# Patient Record
Sex: FEMALE | Race: WHITE | NOT HISPANIC OR LATINO | Employment: OTHER | URBAN - METROPOLITAN AREA
[De-identification: names, ages, dates, MRNs, and addresses within clinical notes are randomized per-mention and may not be internally consistent; named-entity substitution may affect disease eponyms.]

---

## 2017-10-24 ENCOUNTER — HISTORICAL (OUTPATIENT)
Dept: RADIOLOGY | Facility: HOSPITAL | Age: 57
End: 2017-10-24

## 2019-05-30 ENCOUNTER — HISTORICAL (OUTPATIENT)
Dept: RADIOLOGY | Facility: HOSPITAL | Age: 59
End: 2019-05-30

## 2022-03-11 ENCOUNTER — TELEPHONE (OUTPATIENT)
Dept: INTERNAL MEDICINE | Facility: CLINIC | Age: 62
End: 2022-03-11
Payer: COMMERCIAL

## 2022-03-11 NOTE — TELEPHONE ENCOUNTER
----- Message from Aleta Morris sent at 3/10/2022  4:15 PM CST -----  Contact: Self   Pt is requesting for office to request her medical records. Phone: [phone] Dr Wood Cesar. Pt has appt on 3/30 to [facility]. Please advise

## 2022-03-14 ENCOUNTER — CLINICAL SUPPORT (OUTPATIENT)
Dept: INTERNAL MEDICINE | Facility: CLINIC | Age: 62
End: 2022-03-14
Payer: COMMERCIAL

## 2022-03-14 ENCOUNTER — TELEPHONE (OUTPATIENT)
Dept: INTERNAL MEDICINE | Facility: CLINIC | Age: 62
End: 2022-03-14

## 2022-03-14 NOTE — PROGRESS NOTES
Patient in office today to n medical release to obtain records from [facility], and [facility] Dr Duran, releases signed by patient and faxed at this time

## 2022-03-14 NOTE — TELEPHONE ENCOUNTER
Patient in office today to [facility] medical release to obtain records from [facility], and [facility] Dr Duran, releases signed by patient and faxed at this time.

## 2022-03-30 ENCOUNTER — LAB VISIT (OUTPATIENT)
Dept: LAB | Facility: HOSPITAL | Age: 62
End: 2022-03-30
Attending: INTERNAL MEDICINE
Payer: COMMERCIAL

## 2022-03-30 ENCOUNTER — OFFICE VISIT (OUTPATIENT)
Dept: INTERNAL MEDICINE | Facility: CLINIC | Age: 62
End: 2022-03-30
Payer: COMMERCIAL

## 2022-03-30 ENCOUNTER — PATIENT MESSAGE (OUTPATIENT)
Dept: INTERNAL MEDICINE | Facility: CLINIC | Age: 62
End: 2022-03-30

## 2022-03-30 VITALS
WEIGHT: 141.31 LBS | HEIGHT: 66 IN | OXYGEN SATURATION: 98 % | DIASTOLIC BLOOD PRESSURE: 72 MMHG | RESPIRATION RATE: 18 BRPM | BODY MASS INDEX: 22.71 KG/M2 | SYSTOLIC BLOOD PRESSURE: 142 MMHG | HEART RATE: 71 BPM

## 2022-03-30 DIAGNOSIS — M25.50 POLYARTHRALGIA: ICD-10-CM

## 2022-03-30 DIAGNOSIS — Z00.00 LABORATORY EXAMINATION ORDERED AS PART OF A ROUTINE GENERAL MEDICAL EXAMINATION: ICD-10-CM

## 2022-03-30 DIAGNOSIS — Z01.419 WELL WOMAN EXAM: ICD-10-CM

## 2022-03-30 DIAGNOSIS — E03.9 HYPOTHYROIDISM (ACQUIRED): ICD-10-CM

## 2022-03-30 DIAGNOSIS — I49.3 PVC (PREMATURE VENTRICULAR CONTRACTION): ICD-10-CM

## 2022-03-30 DIAGNOSIS — F41.9 ANXIETY: ICD-10-CM

## 2022-03-30 DIAGNOSIS — R01.1 CARDIAC MURMUR: ICD-10-CM

## 2022-03-30 DIAGNOSIS — I10 PRIMARY HYPERTENSION: ICD-10-CM

## 2022-03-30 DIAGNOSIS — Z76.89 ENCOUNTER TO ESTABLISH CARE: Primary | ICD-10-CM

## 2022-03-30 DIAGNOSIS — F17.210 CIGARETTE NICOTINE DEPENDENCE WITHOUT COMPLICATION: ICD-10-CM

## 2022-03-30 DIAGNOSIS — Z12.31 BREAST CANCER SCREENING BY MAMMOGRAM: ICD-10-CM

## 2022-03-30 DIAGNOSIS — E78.2 MIXED HYPERLIPIDEMIA: Primary | ICD-10-CM

## 2022-03-30 LAB
ALBUMIN SERPL BCP-MCNC: 4.6 G/DL (ref 3.5–5.2)
ALP SERPL-CCNC: 61 U/L (ref 55–135)
ALT SERPL W/O P-5'-P-CCNC: 11 U/L (ref 10–44)
ANION GAP SERPL CALC-SCNC: 10 MMOL/L (ref 8–16)
AST SERPL-CCNC: 17 U/L (ref 10–40)
BASOPHILS # BLD AUTO: 0.04 K/UL (ref 0–0.2)
BASOPHILS NFR BLD: 0.6 % (ref 0–1.9)
BILIRUB SERPL-MCNC: 0.5 MG/DL (ref 0.1–1)
BUN SERPL-MCNC: 11 MG/DL (ref 8–23)
CALCIUM SERPL-MCNC: 10.1 MG/DL (ref 8.7–10.5)
CHLORIDE SERPL-SCNC: 102 MMOL/L (ref 95–110)
CHOLEST SERPL-MCNC: 218 MG/DL (ref 120–199)
CHOLEST/HDLC SERPL: 3.6 {RATIO} (ref 2–5)
CO2 SERPL-SCNC: 27 MMOL/L (ref 23–29)
CREAT SERPL-MCNC: 0.8 MG/DL (ref 0.5–1.4)
DIFFERENTIAL METHOD: ABNORMAL
EOSINOPHIL # BLD AUTO: 0.2 K/UL (ref 0–0.5)
EOSINOPHIL NFR BLD: 2.7 % (ref 0–8)
ERYTHROCYTE [DISTWIDTH] IN BLOOD BY AUTOMATED COUNT: 13.1 % (ref 11.5–14.5)
EST. GFR  (AFRICAN AMERICAN): >60 ML/MIN/1.73 M^2
EST. GFR  (NON AFRICAN AMERICAN): >60 ML/MIN/1.73 M^2
ESTIMATED AVG GLUCOSE: 103 MG/DL (ref 68–131)
GLUCOSE SERPL-MCNC: 96 MG/DL (ref 70–110)
HBA1C MFR BLD: 5.2 % (ref 4–5.6)
HCT VFR BLD AUTO: 50.4 % (ref 37–48.5)
HDLC SERPL-MCNC: 60 MG/DL (ref 40–75)
HDLC SERPL: 27.5 % (ref 20–50)
HGB BLD-MCNC: 16.1 G/DL (ref 12–16)
IMM GRANULOCYTES # BLD AUTO: 0.03 K/UL (ref 0–0.04)
IMM GRANULOCYTES NFR BLD AUTO: 0.4 % (ref 0–0.5)
LDLC SERPL CALC-MCNC: 132.4 MG/DL (ref 63–159)
LYMPHOCYTES # BLD AUTO: 2.2 K/UL (ref 1–4.8)
LYMPHOCYTES NFR BLD: 32.3 % (ref 18–48)
MCH RBC QN AUTO: 30.4 PG (ref 27–31)
MCHC RBC AUTO-ENTMCNC: 31.9 G/DL (ref 32–36)
MCV RBC AUTO: 95 FL (ref 82–98)
MONOCYTES # BLD AUTO: 0.5 K/UL (ref 0.3–1)
MONOCYTES NFR BLD: 7.3 % (ref 4–15)
NEUTROPHILS # BLD AUTO: 3.8 K/UL (ref 1.8–7.7)
NEUTROPHILS NFR BLD: 56.7 % (ref 38–73)
NONHDLC SERPL-MCNC: 158 MG/DL
NRBC BLD-RTO: 0 /100 WBC
PLATELET # BLD AUTO: 281 K/UL (ref 150–450)
PMV BLD AUTO: 10.8 FL (ref 9.2–12.9)
POTASSIUM SERPL-SCNC: 4.5 MMOL/L (ref 3.5–5.1)
PROT SERPL-MCNC: 7.9 G/DL (ref 6–8.4)
RBC # BLD AUTO: 5.3 M/UL (ref 4–5.4)
SODIUM SERPL-SCNC: 139 MMOL/L (ref 136–145)
TRIGL SERPL-MCNC: 128 MG/DL (ref 30–150)
TSH SERPL DL<=0.005 MIU/L-ACNC: 1.69 UIU/ML (ref 0.4–4)
WBC # BLD AUTO: 6.68 K/UL (ref 3.9–12.7)

## 2022-03-30 PROCEDURE — 4010F ACE/ARB THERAPY RXD/TAKEN: CPT | Mod: CPTII,S$GLB,, | Performed by: INTERNAL MEDICINE

## 2022-03-30 PROCEDURE — 3078F DIAST BP <80 MM HG: CPT | Mod: CPTII,S$GLB,, | Performed by: INTERNAL MEDICINE

## 2022-03-30 PROCEDURE — 4010F PR ACE/ARB THEARPY RXD/TAKEN: ICD-10-PCS | Mod: CPTII,S$GLB,, | Performed by: INTERNAL MEDICINE

## 2022-03-30 PROCEDURE — 3077F SYST BP >= 140 MM HG: CPT | Mod: CPTII,S$GLB,, | Performed by: INTERNAL MEDICINE

## 2022-03-30 PROCEDURE — 83036 HEMOGLOBIN GLYCOSYLATED A1C: CPT | Performed by: INTERNAL MEDICINE

## 2022-03-30 PROCEDURE — 99999 PR PBB SHADOW E&M-EST. PATIENT-LVL V: ICD-10-PCS | Mod: PBBFAC,,, | Performed by: INTERNAL MEDICINE

## 2022-03-30 PROCEDURE — 99386 PREV VISIT NEW AGE 40-64: CPT | Mod: S$GLB,,, | Performed by: INTERNAL MEDICINE

## 2022-03-30 PROCEDURE — 36415 COLL VENOUS BLD VENIPUNCTURE: CPT | Performed by: INTERNAL MEDICINE

## 2022-03-30 PROCEDURE — 99999 PR PBB SHADOW E&M-EST. PATIENT-LVL V: CPT | Mod: PBBFAC,,, | Performed by: INTERNAL MEDICINE

## 2022-03-30 PROCEDURE — 80061 LIPID PANEL: CPT | Performed by: INTERNAL MEDICINE

## 2022-03-30 PROCEDURE — 80053 COMPREHEN METABOLIC PANEL: CPT | Performed by: INTERNAL MEDICINE

## 2022-03-30 PROCEDURE — 3078F PR MOST RECENT DIASTOLIC BLOOD PRESSURE < 80 MM HG: ICD-10-PCS | Mod: CPTII,S$GLB,, | Performed by: INTERNAL MEDICINE

## 2022-03-30 PROCEDURE — 84443 ASSAY THYROID STIM HORMONE: CPT | Performed by: INTERNAL MEDICINE

## 2022-03-30 PROCEDURE — 3008F BODY MASS INDEX DOCD: CPT | Mod: CPTII,S$GLB,, | Performed by: INTERNAL MEDICINE

## 2022-03-30 PROCEDURE — 3077F PR MOST RECENT SYSTOLIC BLOOD PRESSURE >= 140 MM HG: ICD-10-PCS | Mod: CPTII,S$GLB,, | Performed by: INTERNAL MEDICINE

## 2022-03-30 PROCEDURE — 86803 HEPATITIS C AB TEST: CPT | Performed by: INTERNAL MEDICINE

## 2022-03-30 PROCEDURE — 1159F MED LIST DOCD IN RCRD: CPT | Mod: CPTII,S$GLB,, | Performed by: INTERNAL MEDICINE

## 2022-03-30 PROCEDURE — 3008F PR BODY MASS INDEX (BMI) DOCUMENTED: ICD-10-PCS | Mod: CPTII,S$GLB,, | Performed by: INTERNAL MEDICINE

## 2022-03-30 PROCEDURE — 87389 HIV-1 AG W/HIV-1&-2 AB AG IA: CPT | Performed by: INTERNAL MEDICINE

## 2022-03-30 PROCEDURE — 1159F PR MEDICATION LIST DOCUMENTED IN MEDICAL RECORD: ICD-10-PCS | Mod: CPTII,S$GLB,, | Performed by: INTERNAL MEDICINE

## 2022-03-30 PROCEDURE — 85025 COMPLETE CBC W/AUTO DIFF WBC: CPT | Performed by: INTERNAL MEDICINE

## 2022-03-30 PROCEDURE — 99386 PR PREVENTIVE VISIT,NEW,40-64: ICD-10-PCS | Mod: S$GLB,,, | Performed by: INTERNAL MEDICINE

## 2022-03-30 RX ORDER — LOSARTAN POTASSIUM 50 MG/1
50 TABLET ORAL DAILY
Qty: 90 TABLET | Refills: 3 | Status: SHIPPED | OUTPATIENT
Start: 2022-03-30 | End: 2023-02-06 | Stop reason: SDUPTHER

## 2022-03-30 RX ORDER — DILTIAZEM HYDROCHLORIDE 240 MG/1
240 CAPSULE, COATED, EXTENDED RELEASE ORAL DAILY
COMMUNITY
Start: 2021-11-22 | End: 2022-03-30 | Stop reason: SDUPTHER

## 2022-03-30 RX ORDER — HYDROCHLOROTHIAZIDE 25 MG/1
25 TABLET ORAL DAILY
COMMUNITY
Start: 2021-11-22 | End: 2022-03-30 | Stop reason: SDUPTHER

## 2022-03-30 RX ORDER — LEVOTHYROXINE SODIUM 50 UG/1
50 TABLET ORAL
COMMUNITY
End: 2022-03-30 | Stop reason: SDUPTHER

## 2022-03-30 RX ORDER — ASPIRIN 81 MG/1
81 TABLET ORAL DAILY
COMMUNITY

## 2022-03-30 RX ORDER — LEVOTHYROXINE SODIUM 50 UG/1
50 TABLET ORAL
Qty: 90 TABLET | Refills: 3 | Status: SHIPPED | OUTPATIENT
Start: 2022-03-30 | End: 2023-02-06 | Stop reason: SDUPTHER

## 2022-03-30 RX ORDER — BUPROPION HYDROCHLORIDE 150 MG/1
150 TABLET ORAL 2 TIMES DAILY
Qty: 180 TABLET | Refills: 3 | Status: SHIPPED | OUTPATIENT
Start: 2022-03-30 | End: 2023-03-30 | Stop reason: SDUPTHER

## 2022-03-30 RX ORDER — DILTIAZEM HYDROCHLORIDE 240 MG/1
240 CAPSULE, COATED, EXTENDED RELEASE ORAL DAILY
Qty: 90 CAPSULE | Refills: 3 | Status: SHIPPED | OUTPATIENT
Start: 2022-03-30 | End: 2022-12-27

## 2022-03-30 RX ORDER — LOSARTAN POTASSIUM 50 MG/1
TABLET ORAL
COMMUNITY
End: 2022-03-30 | Stop reason: SDUPTHER

## 2022-03-30 RX ORDER — HYDROCHLOROTHIAZIDE 25 MG/1
25 TABLET ORAL DAILY
Qty: 90 TABLET | Refills: 3 | Status: SHIPPED | OUTPATIENT
Start: 2022-03-30 | End: 2023-02-06 | Stop reason: SDUPTHER

## 2022-03-30 RX ORDER — ATORVASTATIN CALCIUM 10 MG/1
10 TABLET, FILM COATED ORAL DAILY
Qty: 90 TABLET | Refills: 3 | Status: SHIPPED | OUTPATIENT
Start: 2022-03-30 | End: 2023-02-06 | Stop reason: SINTOL

## 2022-03-30 NOTE — PROGRESS NOTES
Subjective:       Patient ID: Nora Burger is a 61 y.o. female.    Chief Complaint: No chief complaint on file.      HPI  Nora Burger is a 61 y.o. year old female with HTN, PVCs, mitral valve murmur (asymptomatic, had seen cards), smoker (1/2-1 ppd x 45 years), hypothyroidism presents for establishment of care.    Records review from [facility]  Last mammogram 2020 (normal)  Last colon   Last pap hysterectomy  covid-19 vaccine ,       Past surgical hx   x 2  Dilation of esophagus 2019    Former smoker, quit 2019?    meds  HCTZ 25  Losartan 50  ASA 81  wellbutrin  q24  cartia xt (diltiazem) 240 mg qday  Levothyroxine 50    Review of Systems   Constitutional: Negative for activity change, appetite change, fatigue, fever and unexpected weight change.   HENT: Negative for congestion, hearing loss, postnasal drip, sneezing, sore throat, trouble swallowing and voice change.    Eyes: Negative for pain and discharge.   Respiratory: Negative for cough, choking, chest tightness, shortness of breath and wheezing.    Cardiovascular: Negative for chest pain, palpitations and leg swelling.   Gastrointestinal: Negative for abdominal distention, abdominal pain, blood in stool, constipation, diarrhea, nausea and vomiting.   Endocrine: Negative for polydipsia and polyuria.   Genitourinary: Negative for difficulty urinating and flank pain.   Musculoskeletal: Negative for arthralgias, back pain, joint swelling, myalgias and neck pain.   Skin: Negative for rash.   Neurological: Negative for dizziness, tremors, seizures, weakness, numbness and headaches.   Psychiatric/Behavioral: Negative for agitation. The patient is not nervous/anxious.          History reviewed. No pertinent past medical history.     Prior to Admission medications    Not on File        Past medical history, surgical history, and family medical history reviewed and updated as appropriate.    Medications and allergies  "reviewed.     Objective:          Vitals:    03/30/22 0839   BP: (!) 142/72   BP Location: Right arm   Patient Position: Sitting   BP Method: Medium (Manual)   Pulse: 71   Resp: 18   SpO2: 98%   Weight: 64.1 kg (141 lb 5 oz)   Height: 5' 6" (1.676 m)     Body mass index is 22.81 kg/m².  Physical Exam  Constitutional:       Appearance: She is well-developed.   HENT:      Head: Normocephalic and atraumatic.   Eyes:      Extraocular Movements: Extraocular movements intact.   Cardiovascular:      Rate and Rhythm: Normal rate and regular rhythm.      Heart sounds: Normal heart sounds.   Pulmonary:      Effort: Pulmonary effort is normal. No respiratory distress.      Breath sounds: Normal breath sounds. No wheezing.   Abdominal:      General: Bowel sounds are normal. There is no distension.      Palpations: Abdomen is soft.      Tenderness: There is no abdominal tenderness.   Musculoskeletal:         General: No tenderness. Normal range of motion.      Cervical back: Normal range of motion.   Skin:     General: Skin is warm and dry.   Neurological:      Mental Status: She is alert and oriented to person, place, and time.      Cranial Nerves: No cranial nerve deficit.      Deep Tendon Reflexes: Reflexes are normal and symmetric.         No results found for: WBC, HGB, HCT, PLT, CHOL, TRIG, HDL, LDLDIRECT, ALT, AST, NA, K, CL, CREATININE, BUN, CO2, TSH, PSA, INR, GLUF, HGBA1C, MICROALBUR    Assessment:       1. Encounter to establish care    2. Laboratory examination ordered as part of a routine general medical examination    3. Primary hypertension    4. PVC (premature ventricular contraction)    5. Hypothyroidism (acquired)    6. Polyarthralgia    7. Anxiety    8. Breast cancer screening by mammogram    9. Well woman exam    10. Cigarette nicotine dependence without complication          Plan:     Diagnoses and all orders for this visit:    Encounter to establish care    Laboratory examination ordered as part of a " routine general medical examination  -     Hemoglobin A1C; Future  -     Lipid Panel; Future  -     Hepatitis C Antibody; Future  -     HIV 1/2 Ag/Ab (4th Gen); Future    Primary hypertension  -     CBC Auto Differential; Future  -     Comprehensive Metabolic Panel; Future  -     TSH; Future  -     Hemoglobin A1C; Future  -     Lipid Panel; Future  -     losartan (COZAAR) 50 MG tablet; Take 1 tablet (50 mg total) by mouth once daily.  -     hydroCHLOROthiazide (HYDRODIURIL) 25 MG tablet; Take 1 tablet (25 mg total) by mouth once daily.  -     diltiaZEM (CARDIZEM CD) 240 MG 24 hr capsule; Take 1 capsule (240 mg total) by mouth once daily.    PVC (premature ventricular contraction)  -     diltiaZEM (CARDIZEM CD) 240 MG 24 hr capsule; Take 1 capsule (240 mg total) by mouth once daily.    Hypothyroidism (acquired)  -     levothyroxine (SYNTHROID) 50 MCG tablet; Take 1 tablet (50 mcg total) by mouth before breakfast.    Polyarthralgia    Anxiety  -     buPROPion (WELLBUTRIN XL) 150 MG TB24 tablet; Take 1 tablet (150 mg total) by mouth 2 (two) times a day.    Breast cancer screening by mammogram  -     Mammo Digital Screening Bilat; Future    Well woman exam  -     Ambulatory referral/consult to Gynecology; Future    Cigarette nicotine dependence without complication  Comments:  trying to quit, has quit for several months previously, referral placed to smoking cessation   1/2 ppd x 45 years  Orders:  -     Ambulatory referral/consult to Smoking Cessation Program; Future  -     CT Chest Lung Screening Low Dose; Future  -     buPROPion (WELLBUTRIN XL) 150 MG TB24 tablet; Take 1 tablet (150 mg total) by mouth 2 (two) times a day.    HTN - controlled at home, BP much better at home, 110s-120s/70s  Hypothyroidism - refilled levothyroxine 50 mcg qam  Anxiety - refilled wellbutrin xL 150 BID  Tobacco abuse - refer to smoking cessation, counseled on cessation <3 minutes. LDCT for lung cancer screening.  Cardiac murmur / PVCs -  told by cardiologist just needs to take medications; not symptomatic. No dyspnea on exertion, no presyncope, no palpitations / chest pain. Will continue cardizem 240 mg qday.  Referral to gyn for PMS symptoms  - used to be on hormone pellets.       Health maintenance reviewed with patient. Declines immunizations. Discussed due for tdap, pneumococcal, covid-19 booster, shingrix, influenza.     Follow up in about 1 year (around 3/30/2023).    Noah Monroy MD  Internal Medicine / Primary Care  [facility]  3/30/2022

## 2022-03-30 NOTE — PATIENT INSTRUCTIONS
Fasting labwork today  Schedule gynecology appointment  Schedule mammogram    Immunizations due   Pneumococcal   Tetanus  Covid-19 booster  Shingles  Influenza    Refills sent to [facility]    Return to clinic in 1 year or sooner if needed.

## 2022-04-01 LAB
HCV AB SERPL QL IA: NEGATIVE
HIV 1+2 AB+HIV1 P24 AG SERPL QL IA: NEGATIVE

## 2022-04-13 ENCOUNTER — CLINICAL SUPPORT (OUTPATIENT)
Dept: SMOKING CESSATION | Facility: CLINIC | Age: 62
End: 2022-04-13
Payer: COMMERCIAL

## 2022-04-13 DIAGNOSIS — F17.200 NICOTINE DEPENDENCE: Primary | ICD-10-CM

## 2022-04-13 PROCEDURE — 99999 PR PBB SHADOW E&M-EST. PATIENT-LVL II: CPT | Mod: PBBFAC,,,

## 2022-04-13 PROCEDURE — 99404 PR PREVENT COUNSEL,INDIV,60 MIN: ICD-10-PCS | Mod: S$GLB,,,

## 2022-04-13 PROCEDURE — 99999 PR PBB SHADOW E&M-EST. PATIENT-LVL II: ICD-10-PCS | Mod: PBBFAC,,,

## 2022-04-13 PROCEDURE — 99404 PREV MED CNSL INDIV APPRX 60: CPT | Mod: S$GLB,,,

## 2022-04-13 RX ORDER — DM/P-EPHED/ACETAMINOPH/DOXYLAM 30-7.5/3
2 LIQUID (ML) ORAL
Qty: 144 LOZENGE | Refills: 0 | Status: SHIPPED | OUTPATIENT
Start: 2022-04-13 | End: 2023-02-06

## 2022-04-13 RX ORDER — IBUPROFEN 200 MG
1 TABLET ORAL DAILY
Qty: 28 PATCH | Refills: 0 | Status: SHIPPED | OUTPATIENT
Start: 2022-04-13 | End: 2023-02-06

## 2022-04-13 NOTE — PROGRESS NOTES
Patient was seen in clinic this morning for Quit 1 intake.  Patient will be participating in biweekly tobacco cessation clinic/phone sessions and will begin the prescribed tobacco cessation medication regimen of 21 mg Nicotine patch along with 2 mg nicotine lozenges. FTND score of 6 indicates a high level of nicotine dependence. LUIS-D score of 9 perceived as no degree of mental distress / possible depression.  Pt will start on rate reduction and wait time of 15 min prior to smoking. Patient currently smoking 1/2 -1 ppd. Patient will be seen in clinic in 2 weeks for follow up progress. Current CO measurement= 13 ppm (0-5 ppm is a non-smoker).  Patient states that her  smokes as well.

## 2022-04-27 ENCOUNTER — CLINICAL SUPPORT (OUTPATIENT)
Dept: SMOKING CESSATION | Facility: CLINIC | Age: 62
End: 2022-04-27
Payer: COMMERCIAL

## 2022-04-27 DIAGNOSIS — F17.200 NICOTINE DEPENDENCE: Primary | ICD-10-CM

## 2022-04-27 PROCEDURE — 99404 PREV MED CNSL INDIV APPRX 60: CPT | Mod: S$GLB,,,

## 2022-04-27 PROCEDURE — 99404 PR PREVENT COUNSEL,INDIV,60 MIN: ICD-10-PCS | Mod: S$GLB,,,

## 2022-04-27 PROCEDURE — 99999 PR PBB SHADOW E&M-EST. PATIENT-LVL I: ICD-10-PCS | Mod: PBBFAC,,,

## 2022-04-27 PROCEDURE — 99999 PR PBB SHADOW E&M-EST. PATIENT-LVL I: CPT | Mod: PBBFAC,,,

## 2022-04-27 RX ORDER — VARENICLINE TARTRATE 1 MG/1
TABLET, FILM COATED ORAL
Qty: 56 TABLET | Refills: 0 | Status: SHIPPED | OUTPATIENT
Start: 2022-04-27 | End: 2022-05-25 | Stop reason: SDUPTHER

## 2022-04-27 NOTE — PROGRESS NOTES
Individual Follow-Up Form    4/27/2022    Quit Date:     Clinical Status of Patient: Outpatient        Continuing Medication: yes  Patches, lozenges    Other Medications:      Target Symptoms: Withdrawal and medication side effects. The following were  rated moderate (3) to severe (4) on TCRS:  · Moderate (3): none  · Severe (4): none    Comments: Patient was seen in clinic this morning for smoking cessation progress update. Patient reported that she was smoking between 8-10 cpd. She feels that she has made some progress. Completion of TCRS (Tobacco Cessation Rating Scale) learned addiction model, cues/triggers, personal reasons for quitting, medications, goals, quit date. Patient  does smoke but tries not to smoke around her while they are outside. Encouraged patient to keep moving her cigarettes and increase wait times. Patient was interested in trying Chantix. She is currently on Wellbutrin that was prescribed by Dr. Monroy for smoking. Will add Chantix to patient smoking cessation medication regimen to see if it aids in her quit episode. Exhaled carbon monoxide level of 6 ppm per Smokerlyzer (0-6 non-smoker). The patient will continue with  therapy sessions and medication monitoring by CTTS. The patient remains on the prescribed tobacco cessation medication regimen of 21 mg nicotine patch without any negative side effects at this time. The patient denies any abnormal behavioral or mental changes at this time.       Diagnosis: F17.200    Next Visit: 2 weeks

## 2022-05-10 ENCOUNTER — CLINICAL SUPPORT (OUTPATIENT)
Dept: SMOKING CESSATION | Facility: CLINIC | Age: 62
End: 2022-05-10
Payer: COMMERCIAL

## 2022-05-10 DIAGNOSIS — F17.200 NICOTINE DEPENDENCE: Primary | ICD-10-CM

## 2022-05-10 PROCEDURE — 99999 PR PBB SHADOW E&M-EST. PATIENT-LVL I: CPT | Mod: PBBFAC,,,

## 2022-05-10 PROCEDURE — 99401 PR PREVENT COUNSEL,INDIV,15 MIN: ICD-10-PCS | Mod: S$GLB,,,

## 2022-05-10 PROCEDURE — 99999 PR PBB SHADOW E&M-EST. PATIENT-LVL I: ICD-10-PCS | Mod: PBBFAC,,,

## 2022-05-10 PROCEDURE — 99401 PREV MED CNSL INDIV APPRX 15: CPT | Mod: S$GLB,,,

## 2022-05-10 NOTE — PROGRESS NOTES
Individual Follow-Up Form    5/10/2022    Quit Date: 5/5/22    Clinical Status of Patient: Outpatient    Continuing Medication: yes  Chantix    Other Medications:      Target Symptoms: Withdrawal and medication side effects. The following were  rated moderate (3) to severe (4) on TCRS:  · Moderate (3): none  · Severe (4): none    Comments: Patient called to cancel her appointment on 5/11/22, due to another appointment. Patient stated that she has not smoked in 6 days. Congratulated patient on her quit. Patient having a dental procedure tomorrow and has given her the goal to quit. She not able to wear patch due to nicotine. Suggested that she not go back to wearing the patch and just continue using Chantix. Patient proud of her quit. The patient will continue with  therapy sessions and medication monitoring by CTTS. The patient remains on the prescribed tobacco cessation medication regimen of 1 mg Chantix BID without any negative side effects at this time. The patient denies any abnormal behavioral or mental changes at this time.     Diagnosis: F17.200    Next Visit: 2 weeks

## 2022-05-25 ENCOUNTER — CLINICAL SUPPORT (OUTPATIENT)
Dept: SMOKING CESSATION | Facility: CLINIC | Age: 62
End: 2022-05-25
Payer: COMMERCIAL

## 2022-05-25 DIAGNOSIS — F17.200 NICOTINE DEPENDENCE: Primary | ICD-10-CM

## 2022-05-25 PROCEDURE — 99404 PREV MED CNSL INDIV APPRX 60: CPT | Mod: S$GLB,,,

## 2022-05-25 PROCEDURE — 99404 PR PREVENT COUNSEL,INDIV,60 MIN: ICD-10-PCS | Mod: S$GLB,,,

## 2022-05-25 PROCEDURE — 99999 PR PBB SHADOW E&M-EST. PATIENT-LVL I: CPT | Mod: PBBFAC,,,

## 2022-05-25 PROCEDURE — 99999 PR PBB SHADOW E&M-EST. PATIENT-LVL I: ICD-10-PCS | Mod: PBBFAC,,,

## 2022-05-25 RX ORDER — VARENICLINE TARTRATE 1 MG/1
TABLET, FILM COATED ORAL
Qty: 56 TABLET | Refills: 0 | Status: SHIPPED | OUTPATIENT
Start: 2022-05-25 | End: 2023-02-06

## 2022-05-25 NOTE — PROGRESS NOTES
Individual Follow-Up Form    5/25/2022    Quit Date: 5/5/22    Clinical Status of Patient: Outpatient    Continuing Medication: yes  Chantix    Other Medications:      Target Symptoms: Withdrawal and medication side effects. The following were  rated moderate (3) to severe (4) on TCRS:  · Moderate (3): none  · Severe (4): none    Comments: Patient was seen in clinic this morning for smoking cessation progress update. Patient reports that she is still tobacco free. Congratulated patient on her continued quit episode. Patient not having many urges/craves. She tries to stay busy. completion of TCRS (Tobacco Cessation Rating Scale) reviewed strategies, habitual behavior, stress, and high risk situations. Introduced stress with addition interventions, SOLVE, relaxation with interventions, nutrition, exercise, weight gain, and the importance of rewarding oneself for accomplishments toward becoming tobacco free. Open discussion of all items with interventions. The patient will continue with  therapy sessions and medication monitoring by CTTS. The patient remains on the prescribed tobacco cessation medication regimen of 1 mg Chantix BID without any negative side effects at this time. Exhaled carbon monoxide level of 1 ppm per Smokerlyzer (0-6 non-smoker). The patient denies any abnormal behavioral or mental changes at this time.         Diagnosis: F17.210    Next Visit: 3 weeks

## 2022-06-03 DIAGNOSIS — E03.9 HYPOTHYROIDISM (ACQUIRED): ICD-10-CM

## 2022-06-03 RX ORDER — LEVOTHYROXINE SODIUM 50 UG/1
50 TABLET ORAL
Qty: 90 TABLET | Refills: 3 | Status: CANCELLED | OUTPATIENT
Start: 2022-06-03

## 2022-06-03 NOTE — TELEPHONE ENCOUNTER
Called patient and left a voicemail. It may be too early for patient medication to be refilled. Her last refill should have been 5/30/22.

## 2022-06-21 ENCOUNTER — CLINICAL SUPPORT (OUTPATIENT)
Dept: SMOKING CESSATION | Facility: CLINIC | Age: 62
End: 2022-06-21
Payer: COMMERCIAL

## 2022-06-21 ENCOUNTER — PATIENT MESSAGE (OUTPATIENT)
Dept: SMOKING CESSATION | Facility: CLINIC | Age: 62
End: 2022-06-21
Payer: COMMERCIAL

## 2022-06-21 ENCOUNTER — TELEPHONE (OUTPATIENT)
Dept: SMOKING CESSATION | Facility: CLINIC | Age: 62
End: 2022-06-21
Payer: COMMERCIAL

## 2022-06-21 DIAGNOSIS — F17.200 NICOTINE DEPENDENCE: Primary | ICD-10-CM

## 2022-06-21 PROCEDURE — 99407 BEHAV CHNG SMOKING > 10 MIN: CPT | Mod: S$GLB,,,

## 2022-06-21 PROCEDURE — 99999 PR PBB SHADOW E&M-EST. PATIENT-LVL I: CPT | Mod: PBBFAC,,,

## 2022-06-21 PROCEDURE — 99999 PR PBB SHADOW E&M-EST. PATIENT-LVL I: ICD-10-PCS | Mod: PBBFAC,,,

## 2022-06-21 PROCEDURE — 99407 PR TOBACCO USE CESSATION INTENSIVE >10 MINUTES: ICD-10-PCS | Mod: S$GLB,,,

## 2022-06-21 NOTE — TELEPHONE ENCOUNTER
"Smoking Cessation Clinic- Patient has sent message through portal stating:  Rivera Mccullough  Everything is still going well have been able to stop my Chantix.  I think I am good to go now so you can cancel my future appointments.  Thanks again for all your help.  Such a great feeling to wake up every day smoke free.  Tir    My portal message back to patient:  Mrs. Burger,  Good afternoon. Congratulations on your continued quit episode. If you feel at any time you need additional support for your quit, please feel free to reach out. Your benefits are available. Stay focused and remember the reasons why you wanted to quit. It has been my pleasure helping you on your journey in becoming "Tobacco Free".  Sincerely,  Sadia Islas, RRT, CTTS  [phone]    "

## 2022-07-05 ENCOUNTER — HOSPITAL ENCOUNTER (OUTPATIENT)
Dept: RADIOLOGY | Facility: HOSPITAL | Age: 62
Discharge: HOME | End: 2022-07-05
Attending: INTERNAL MEDICINE
Payer: COMMERCIAL

## 2022-07-05 VITALS — BODY MASS INDEX: 22.5 KG/M2 | HEIGHT: 66 IN | WEIGHT: 140 LBS

## 2022-07-05 DIAGNOSIS — Z12.31 BREAST CANCER SCREENING BY MAMMOGRAM: ICD-10-CM

## 2022-07-05 PROCEDURE — 77067 SCR MAMMO BI INCL CAD: CPT | Mod: 26,,, | Performed by: RADIOLOGY

## 2022-07-05 PROCEDURE — 77063 BREAST TOMOSYNTHESIS BI: CPT | Mod: 26,,, | Performed by: RADIOLOGY

## 2022-07-05 PROCEDURE — 77067 SCR MAMMO BI INCL CAD: CPT | Mod: TC,PO

## 2022-07-05 PROCEDURE — 77063 BREAST TOMOSYNTHESIS BI: CPT | Mod: TC,PO

## 2022-07-05 PROCEDURE — 77063 MAMMO DIGITAL SCREENING BILAT WITH TOMO: ICD-10-PCS | Mod: 26,,, | Performed by: RADIOLOGY

## 2022-07-05 PROCEDURE — 77067 MAMMO DIGITAL SCREENING BILAT WITH TOMO: ICD-10-PCS | Mod: 26,,, | Performed by: RADIOLOGY

## 2022-07-13 ENCOUNTER — PATIENT MESSAGE (OUTPATIENT)
Dept: INTERNAL MEDICINE | Facility: CLINIC | Age: 62
End: 2022-07-13
Payer: COMMERCIAL

## 2022-07-13 ENCOUNTER — HOSPITAL ENCOUNTER (OUTPATIENT)
Dept: RADIOLOGY | Facility: HOSPITAL | Age: 62
Discharge: HOME | End: 2022-07-13
Attending: INTERNAL MEDICINE
Payer: COMMERCIAL

## 2022-07-13 DIAGNOSIS — F17.210 CIGARETTE NICOTINE DEPENDENCE WITHOUT COMPLICATION: ICD-10-CM

## 2022-07-13 DIAGNOSIS — R92.8 ABNORMAL MAMMOGRAM OF LEFT BREAST: Primary | ICD-10-CM

## 2022-07-13 PROCEDURE — 71271 CT THORAX LUNG CANCER SCR C-: CPT | Mod: TC

## 2022-07-13 PROCEDURE — 71271 CT THORAX LUNG CANCER SCR C-: CPT | Mod: 26,,, | Performed by: RADIOLOGY

## 2022-07-13 PROCEDURE — 71271 CT CHEST LUNG SCREENING LOW DOSE: ICD-10-PCS | Mod: 26,,, | Performed by: RADIOLOGY

## 2022-07-14 ENCOUNTER — TELEPHONE (OUTPATIENT)
Dept: RADIOLOGY | Facility: HOSPITAL | Age: 62
End: 2022-07-14
Payer: COMMERCIAL

## 2022-07-15 ENCOUNTER — TELEPHONE (OUTPATIENT)
Dept: INTERNAL MEDICINE | Facility: CLINIC | Age: 62
End: 2022-07-15
Payer: COMMERCIAL

## 2022-07-15 ENCOUNTER — PATIENT MESSAGE (OUTPATIENT)
Dept: INTERNAL MEDICINE | Facility: CLINIC | Age: 62
End: 2022-07-15
Payer: COMMERCIAL

## 2022-07-15 DIAGNOSIS — R91.1 SOLITARY PULMONARY NODULE: ICD-10-CM

## 2022-07-15 NOTE — TELEPHONE ENCOUNTER
----- Message from Noah Monroy MD sent at 7/15/2022  9:40 AM CDT -----  Schedule repeat LDCT chest in 6 months

## 2022-07-26 ENCOUNTER — HOSPITAL ENCOUNTER (OUTPATIENT)
Dept: RADIOLOGY | Facility: HOSPITAL | Age: 62
Discharge: HOME | End: 2022-07-26
Attending: INTERNAL MEDICINE
Payer: COMMERCIAL

## 2022-07-26 DIAGNOSIS — R92.8 ABNORMAL MAMMOGRAM OF LEFT BREAST: ICD-10-CM

## 2022-07-26 PROCEDURE — 77065 DX MAMMO INCL CAD UNI: CPT | Mod: TC,LT

## 2022-07-26 PROCEDURE — 77065 DX MAMMO INCL CAD UNI: CPT | Mod: 26,LT,, | Performed by: RADIOLOGY

## 2022-07-26 PROCEDURE — 77061 BREAST TOMOSYNTHESIS UNI: CPT | Mod: 26,LT,, | Performed by: RADIOLOGY

## 2022-07-26 PROCEDURE — 76642 US BREAST LEFT LIMITED: ICD-10-PCS | Mod: 26,LT,, | Performed by: RADIOLOGY

## 2022-07-26 PROCEDURE — 76642 ULTRASOUND BREAST LIMITED: CPT | Mod: TC,LT

## 2022-07-26 PROCEDURE — 76642 ULTRASOUND BREAST LIMITED: CPT | Mod: 26,LT,, | Performed by: RADIOLOGY

## 2022-07-26 PROCEDURE — 77061 MAMMO DIGITAL DIAGNOSTIC LEFT WITH TOMO: ICD-10-PCS | Mod: 26,LT,, | Performed by: RADIOLOGY

## 2022-07-26 PROCEDURE — 77065 MAMMO DIGITAL DIAGNOSTIC LEFT WITH TOMO: ICD-10-PCS | Mod: 26,LT,, | Performed by: RADIOLOGY

## 2022-07-27 ENCOUNTER — PATIENT MESSAGE (OUTPATIENT)
Dept: INTERNAL MEDICINE | Facility: CLINIC | Age: 62
End: 2022-07-27
Payer: COMMERCIAL

## 2022-07-27 DIAGNOSIS — R92.8 ABNORMAL MAMMOGRAM OF LEFT BREAST: Primary | ICD-10-CM

## 2022-07-28 ENCOUNTER — TELEPHONE (OUTPATIENT)
Dept: INTERNAL MEDICINE | Facility: CLINIC | Age: 62
End: 2022-07-28
Payer: COMMERCIAL

## 2022-07-28 ENCOUNTER — PATIENT MESSAGE (OUTPATIENT)
Dept: ADMINISTRATIVE | Facility: HOSPITAL | Age: 62
End: 2022-07-28
Payer: COMMERCIAL

## 2022-08-05 ENCOUNTER — CLINICAL SUPPORT (OUTPATIENT)
Dept: SMOKING CESSATION | Facility: CLINIC | Age: 62
End: 2022-08-05
Payer: COMMERCIAL

## 2022-08-05 DIAGNOSIS — F17.200 NICOTINE DEPENDENCE: Primary | ICD-10-CM

## 2022-08-05 PROCEDURE — 99407 BEHAV CHNG SMOKING > 10 MIN: CPT | Mod: S$GLB,,,

## 2022-08-05 PROCEDURE — 99407 PR TOBACCO USE CESSATION INTENSIVE >10 MINUTES: ICD-10-PCS | Mod: S$GLB,,,

## 2022-08-05 PROCEDURE — 99999 PR PBB SHADOW E&M-EST. PATIENT-LVL I: ICD-10-PCS | Mod: PBBFAC,,,

## 2022-08-05 PROCEDURE — 99999 PR PBB SHADOW E&M-EST. PATIENT-LVL I: CPT | Mod: PBBFAC,,,

## 2022-08-05 NOTE — PROGRESS NOTES
Spoke with patient today in regard to smoking cessation progress for 3-month telephone follow up for Quit 1. Patient states that she is tobacco free. Congratulated patient on her continued quit.  Informed patient of benefit period, future follow ups, and contact information if any further help or support is needed. Will complete smart form for 3-month phone follow up for Quit 1.

## 2022-10-26 ENCOUNTER — CLINICAL SUPPORT (OUTPATIENT)
Dept: SMOKING CESSATION | Facility: CLINIC | Age: 62
End: 2022-10-26
Payer: COMMERCIAL

## 2022-10-26 DIAGNOSIS — F17.200 NICOTINE DEPENDENCE: Primary | ICD-10-CM

## 2022-10-26 PROCEDURE — 99999 PR PBB SHADOW E&M-EST. PATIENT-LVL I: CPT | Mod: PBBFAC,,,

## 2022-10-26 PROCEDURE — 99407 PR TOBACCO USE CESSATION INTENSIVE >10 MINUTES: ICD-10-PCS | Mod: S$GLB,,,

## 2022-10-26 PROCEDURE — 99407 BEHAV CHNG SMOKING > 10 MIN: CPT | Mod: S$GLB,,,

## 2022-10-26 PROCEDURE — 99999 PR PBB SHADOW E&M-EST. PATIENT-LVL I: ICD-10-PCS | Mod: PBBFAC,,,

## 2022-10-26 NOTE — PROGRESS NOTES
Called pt to f/u on her 6 month smoking cessation quit status. Pt stated she remains tobacco free. Congratulated her on her hard work and success. Informed her of benefit period, phone follow ups, and contact information. Will complete smart form and will continue to follow up on quit #1 episode.

## 2022-11-16 ENCOUNTER — IMMUNIZATION (OUTPATIENT)
Dept: INTERNAL MEDICINE | Facility: CLINIC | Age: 62
End: 2022-11-16
Payer: COMMERCIAL

## 2022-11-16 PROCEDURE — 90686 IIV4 VACC NO PRSV 0.5 ML IM: CPT | Mod: S$GLB,,, | Performed by: INTERNAL MEDICINE

## 2022-11-16 PROCEDURE — 90471 IMMUNIZATION ADMIN: CPT | Mod: S$GLB,,, | Performed by: INTERNAL MEDICINE

## 2022-11-16 PROCEDURE — 90686 FLU VACCINE (QUAD) GREATER THAN OR EQUAL TO 3YO PRESERVATIVE FREE IM: ICD-10-PCS | Mod: S$GLB,,, | Performed by: INTERNAL MEDICINE

## 2022-11-16 PROCEDURE — 90471 FLU VACCINE (QUAD) GREATER THAN OR EQUAL TO 3YO PRESERVATIVE FREE IM: ICD-10-PCS | Mod: S$GLB,,, | Performed by: INTERNAL MEDICINE

## 2023-01-17 ENCOUNTER — HOSPITAL ENCOUNTER (OUTPATIENT)
Dept: RADIOLOGY | Facility: HOSPITAL | Age: 63
Discharge: HOME | End: 2023-01-17
Attending: INTERNAL MEDICINE
Payer: COMMERCIAL

## 2023-01-17 DIAGNOSIS — R91.1 SOLITARY PULMONARY NODULE: ICD-10-CM

## 2023-01-17 PROCEDURE — 71271 CT CHEST LUNG SCREENING LOW DOSE: ICD-10-PCS | Mod: 26,,, | Performed by: RADIOLOGY

## 2023-01-17 PROCEDURE — 71271 CT THORAX LUNG CANCER SCR C-: CPT | Mod: TC

## 2023-01-17 PROCEDURE — 71271 CT THORAX LUNG CANCER SCR C-: CPT | Mod: 26,,, | Performed by: RADIOLOGY

## 2023-01-18 ENCOUNTER — PATIENT MESSAGE (OUTPATIENT)
Dept: INTERNAL MEDICINE | Facility: CLINIC | Age: 63
End: 2023-01-18
Payer: COMMERCIAL

## 2023-02-06 ENCOUNTER — OFFICE VISIT (OUTPATIENT)
Dept: INTERNAL MEDICINE | Facility: CLINIC | Age: 63
End: 2023-02-06
Payer: COMMERCIAL

## 2023-02-06 VITALS
HEIGHT: 66 IN | SYSTOLIC BLOOD PRESSURE: 118 MMHG | BODY MASS INDEX: 23.46 KG/M2 | HEART RATE: 96 BPM | OXYGEN SATURATION: 98 % | DIASTOLIC BLOOD PRESSURE: 70 MMHG | WEIGHT: 145.94 LBS

## 2023-02-06 DIAGNOSIS — I10 PRIMARY HYPERTENSION: ICD-10-CM

## 2023-02-06 DIAGNOSIS — I49.3 PVC (PREMATURE VENTRICULAR CONTRACTION): ICD-10-CM

## 2023-02-06 DIAGNOSIS — Z87.891 HISTORY OF TOBACCO ABUSE: ICD-10-CM

## 2023-02-06 DIAGNOSIS — J43.9 PULMONARY EMPHYSEMA, UNSPECIFIED EMPHYSEMA TYPE: ICD-10-CM

## 2023-02-06 DIAGNOSIS — Z00.00 ENCOUNTER FOR ANNUAL PHYSICAL EXAM: Primary | ICD-10-CM

## 2023-02-06 DIAGNOSIS — E03.9 HYPOTHYROIDISM (ACQUIRED): ICD-10-CM

## 2023-02-06 DIAGNOSIS — R07.89 ATYPICAL CHEST PAIN: ICD-10-CM

## 2023-02-06 DIAGNOSIS — I34.1 MITRAL VALVE PROLAPSE: ICD-10-CM

## 2023-02-06 DIAGNOSIS — I25.10 ATHEROSCLEROSIS OF NATIVE CORONARY ARTERY OF NATIVE HEART, UNSPECIFIED WHETHER ANGINA PRESENT: ICD-10-CM

## 2023-02-06 DIAGNOSIS — Z12.11 SCREENING FOR COLON CANCER: ICD-10-CM

## 2023-02-06 DIAGNOSIS — Z23 NEED FOR TDAP VACCINATION: ICD-10-CM

## 2023-02-06 DIAGNOSIS — R01.1 CARDIAC MURMUR: ICD-10-CM

## 2023-02-06 PROCEDURE — 4010F ACE/ARB THERAPY RXD/TAKEN: CPT | Mod: CPTII,S$GLB,, | Performed by: INTERNAL MEDICINE

## 2023-02-06 PROCEDURE — 3074F PR MOST RECENT SYSTOLIC BLOOD PRESSURE < 130 MM HG: ICD-10-PCS | Mod: CPTII,S$GLB,, | Performed by: INTERNAL MEDICINE

## 2023-02-06 PROCEDURE — 1159F PR MEDICATION LIST DOCUMENTED IN MEDICAL RECORD: ICD-10-PCS | Mod: CPTII,S$GLB,, | Performed by: INTERNAL MEDICINE

## 2023-02-06 PROCEDURE — 3008F BODY MASS INDEX DOCD: CPT | Mod: CPTII,S$GLB,, | Performed by: INTERNAL MEDICINE

## 2023-02-06 PROCEDURE — 3078F DIAST BP <80 MM HG: CPT | Mod: CPTII,S$GLB,, | Performed by: INTERNAL MEDICINE

## 2023-02-06 PROCEDURE — 99999 PR PBB SHADOW E&M-EST. PATIENT-LVL IV: ICD-10-PCS | Mod: PBBFAC,,, | Performed by: INTERNAL MEDICINE

## 2023-02-06 PROCEDURE — 4010F PR ACE/ARB THEARPY RXD/TAKEN: ICD-10-PCS | Mod: CPTII,S$GLB,, | Performed by: INTERNAL MEDICINE

## 2023-02-06 PROCEDURE — 1160F RVW MEDS BY RX/DR IN RCRD: CPT | Mod: CPTII,S$GLB,, | Performed by: INTERNAL MEDICINE

## 2023-02-06 PROCEDURE — 99396 PR PREVENTIVE VISIT,EST,40-64: ICD-10-PCS | Mod: S$GLB,,, | Performed by: INTERNAL MEDICINE

## 2023-02-06 PROCEDURE — 3074F SYST BP LT 130 MM HG: CPT | Mod: CPTII,S$GLB,, | Performed by: INTERNAL MEDICINE

## 2023-02-06 PROCEDURE — 99396 PREV VISIT EST AGE 40-64: CPT | Mod: S$GLB,,, | Performed by: INTERNAL MEDICINE

## 2023-02-06 PROCEDURE — 1159F MED LIST DOCD IN RCRD: CPT | Mod: CPTII,S$GLB,, | Performed by: INTERNAL MEDICINE

## 2023-02-06 PROCEDURE — 99999 PR PBB SHADOW E&M-EST. PATIENT-LVL IV: CPT | Mod: PBBFAC,,, | Performed by: INTERNAL MEDICINE

## 2023-02-06 PROCEDURE — 3008F PR BODY MASS INDEX (BMI) DOCUMENTED: ICD-10-PCS | Mod: CPTII,S$GLB,, | Performed by: INTERNAL MEDICINE

## 2023-02-06 PROCEDURE — 1160F PR REVIEW ALL MEDS BY PRESCRIBER/CLIN PHARMACIST DOCUMENTED: ICD-10-PCS | Mod: CPTII,S$GLB,, | Performed by: INTERNAL MEDICINE

## 2023-02-06 PROCEDURE — 3078F PR MOST RECENT DIASTOLIC BLOOD PRESSURE < 80 MM HG: ICD-10-PCS | Mod: CPTII,S$GLB,, | Performed by: INTERNAL MEDICINE

## 2023-02-06 RX ORDER — LOSARTAN POTASSIUM 50 MG/1
50 TABLET ORAL DAILY
Qty: 90 TABLET | Refills: 3 | Status: SHIPPED | OUTPATIENT
Start: 2023-02-06 | End: 2024-02-06 | Stop reason: SDUPTHER

## 2023-02-06 RX ORDER — ROSUVASTATIN CALCIUM 5 MG/1
5 TABLET, COATED ORAL DAILY
Qty: 90 TABLET | Refills: 3 | Status: SHIPPED | OUTPATIENT
Start: 2023-02-06 | End: 2023-05-10

## 2023-02-06 RX ORDER — DILTIAZEM HYDROCHLORIDE 240 MG/1
240 CAPSULE, COATED, EXTENDED RELEASE ORAL DAILY
Qty: 90 CAPSULE | Refills: 3 | Status: SHIPPED | OUTPATIENT
Start: 2023-02-06 | End: 2024-02-06 | Stop reason: SDUPTHER

## 2023-02-06 RX ORDER — HYDROCHLOROTHIAZIDE 25 MG/1
25 TABLET ORAL DAILY
Qty: 90 TABLET | Refills: 3 | Status: SHIPPED | OUTPATIENT
Start: 2023-02-06 | End: 2024-02-06 | Stop reason: SDUPTHER

## 2023-02-06 RX ORDER — LEVOTHYROXINE SODIUM 50 UG/1
50 TABLET ORAL
Qty: 90 TABLET | Refills: 3 | Status: SHIPPED | OUTPATIENT
Start: 2023-02-06 | End: 2024-02-06 | Stop reason: SDUPTHER

## 2023-02-06 NOTE — PROGRESS NOTES
Subjective:       Patient ID: Nora Burger is a 62 y.o. female.    Chief Complaint: Annual Exam      HPI  Nora Burger is a 62 y.o. year old female with HTN, hypothyroidism, MVP, history of tobacco abuse (quit 8 months ago). Reports chest tightness occurring intermittently, pressure, mid sternal, lasting a few minutes, sometimes associated with exertion. Has to sit down which then relieves spontaneously. Last happened 3 weeks ago. States she has started noticing them this past year. Has not had a stress test for several years.     Review of Systems   Constitutional:  Negative for activity change and unexpected weight change.   HENT:  Negative for hearing loss, rhinorrhea and trouble swallowing.    Eyes:  Negative for discharge and visual disturbance.   Respiratory:  Positive for chest tightness. Negative for wheezing.    Cardiovascular:  Negative for chest pain and palpitations.   Gastrointestinal:  Negative for blood in stool, constipation, diarrhea and vomiting.   Endocrine: Negative for polydipsia and polyuria.   Genitourinary:  Negative for difficulty urinating, dysuria, hematuria and menstrual problem.   Musculoskeletal:  Negative for arthralgias, joint swelling and neck pain.   Neurological:  Negative for weakness and headaches.   Psychiatric/Behavioral:  Negative for confusion and dysphoric mood.        Past Medical History:   Diagnosis Date    Hypertension     Hypothyroidism, unspecified         Prior to Admission medications    Medication Sig Start Date End Date Taking? Authorizing Provider   aspirin (ECOTRIN) 81 MG EC tablet Take 81 mg by mouth once daily.   Yes Historical Provider   atorvastatin (LIPITOR) 10 MG tablet Take 1 tablet (10 mg total) by mouth once daily. 3/30/22 3/30/23 Yes Noah Monroy MD   buPROPion (WELLBUTRIN XL) 150 MG TB24 tablet Take 1 tablet (150 mg total) by mouth 2 (two) times a day. 3/30/22 3/30/23 Yes Noah Monroy MD   diltiaZEM (CARDIZEM CD) 240 MG 24 hr capsule TAKE 1  "CAPSULE BY MOUTH ONCE DAILY 12/27/22  Yes Noah Monroy MD   hydroCHLOROthiazide (HYDRODIURIL) 25 MG tablet Take 1 tablet (25 mg total) by mouth once daily. 3/30/22  Yes Noah Monroy MD   levothyroxine (SYNTHROID) 50 MCG tablet Take 1 tablet (50 mcg total) by mouth before breakfast. 3/30/22  Yes Noah Monroy MD   losartan (COZAAR) 50 MG tablet Take 1 tablet (50 mg total) by mouth once daily. 3/30/22  Yes Noah Monroy MD   meloxicam 15 mg TbDL    Yes Historical Provider   nicotine (NICODERM CQ) 21 mg/24 hr Place 1 patch onto the skin once daily.  Patient not taking: Reported on 5/25/2022 4/13/22   Pietro Landeros MD   nicotine polacrilex 2 MG Lozg Take 1 lozenge (2 mg total) by mouth as needed (in place of cigarettes 6-8 times a day). Cinnamon flavor  Patient not taking: Reported on 5/25/2022 4/13/22   Pietro Landeros MD   varenicline (CHANTIX) 1 mg Tab Take 1 tablet (1 mg total) by mouth 2 (two) times daily  Patient not taking: Reported on 6/21/2022 5/25/22   Pietro Landeros MD        Past medical history, surgical history, and family medical history reviewed and updated as appropriate.    Medications and allergies reviewed.     Objective:          Vitals:    02/06/23 1311   BP: 118/70   BP Location: Right arm   Patient Position: Sitting   Pulse: 96   SpO2: 98%   Weight: 66.2 kg (145 lb 15.1 oz)   Height: 5' 6" (1.676 m)     Body mass index is 23.56 kg/m².  Physical Exam  Constitutional:       Appearance: She is well-developed.   HENT:      Head: Normocephalic and atraumatic.   Eyes:      Extraocular Movements: Extraocular movements intact.   Cardiovascular:      Rate and Rhythm: Normal rate and regular rhythm.      Heart sounds: Murmur heard.   Pulmonary:      Effort: Pulmonary effort is normal. No respiratory distress.      Breath sounds: Normal breath sounds. No wheezing.   Abdominal:      General: Bowel sounds are normal. There is no distension.      Palpations: Abdomen is soft.      Tenderness: There is no " abdominal tenderness.   Musculoskeletal:         General: No tenderness. Normal range of motion.      Cervical back: Normal range of motion.   Skin:     General: Skin is warm and dry.   Neurological:      Mental Status: She is alert and oriented to person, place, and time.      Cranial Nerves: No cranial nerve deficit.      Deep Tendon Reflexes: Reflexes are normal and symmetric.       Lab Results   Component Value Date    WBC 6.68 03/30/2022    HGB 16.1 (H) 03/30/2022    HCT 50.4 (H) 03/30/2022     03/30/2022    CHOL 218 (H) 03/30/2022    TRIG 128 03/30/2022    HDL 60 03/30/2022    ALT 11 03/30/2022    AST 17 03/30/2022     03/30/2022    K 4.5 03/30/2022     03/30/2022    CREATININE 0.8 03/30/2022    BUN 11 03/30/2022    CO2 27 03/30/2022    TSH 1.689 03/30/2022    HGBA1C 5.2 03/30/2022       Assessment:       1. Encounter for annual physical exam    2. Atherosclerosis of native coronary artery of native heart, unspecified whether angina present    3. Cardiac murmur    4. Atypical chest pain    5. Mitral valve prolapse    6. Hypothyroidism (acquired)    7. Primary hypertension    8. History of tobacco abuse    9. Pulmonary emphysema, unspecified emphysema type    10. PVC (premature ventricular contraction)    11. Screening for colon cancer    12. Need for Tdap vaccination          Plan:     Nora was seen today for annual exam.    Diagnoses and all orders for this visit:    Encounter for annual physical exam    Atherosclerosis of native coronary artery of native heart, unspecified whether angina present  Comments:  seen on CT imaging, pt with chest pressure symptoms, possibly anginal equivalent, will obtain EKG, stress testing. Pt to schedule. ASA, statin. bp contr.  Orders:  -     Ambulatory referral/consult to Cardiology; Future  -     Stress Echo Which stress agent will be used? Treadmill Exercise; Color Flow Doppler? No; Future  -     Hemoglobin A1C; Future  -     Lipid Panel; Future  -      rosuvastatin (CRESTOR) 5 MG tablet; Take 1 tablet (5 mg total) by mouth once daily.    Cardiac murmur  -     Ambulatory referral/consult to Cardiology; Future  -     Stress Echo Which stress agent will be used? Treadmill Exercise; Color Flow Doppler? No; Future    Atypical chest pain  -     EKG 12-lead; Future    Mitral valve prolapse  Comments:  diagnosed in her 20s; asymptomatic. will obtain EKG, stress echo  Orders:  -     Ambulatory referral/consult to Cardiology; Future    Hypothyroidism (acquired)  -     TSH; Future  -     levothyroxine (SYNTHROID) 50 MCG tablet; Take 1 tablet (50 mcg total) by mouth before breakfast.    Primary hypertension  Comments:  controlled, no changes to current management  Orders:  -     CBC Auto Differential; Future  -     Comprehensive Metabolic Panel; Future  -     diltiaZEM (CARDIZEM CD) 240 MG 24 hr capsule; Take 1 capsule (240 mg total) by mouth once daily.  -     hydroCHLOROthiazide (HYDRODIURIL) 25 MG tablet; Take 1 tablet (25 mg total) by mouth once daily.  -     losartan (COZAAR) 50 MG tablet; Take 1 tablet (50 mg total) by mouth once daily.    History of tobacco abuse  Comments:  quit 8 months ago, congratulated pt, encouraged continued cessation    Pulmonary emphysema, unspecified emphysema type  Comments:  seen on CT imaging, has used albuterol rescue previously. will obtain formal PFTs  Orders:  -     Complete PFT w/ bronchodilator; Future    PVC (premature ventricular contraction)  -     diltiaZEM (CARDIZEM CD) 240 MG 24 hr capsule; Take 1 capsule (240 mg total) by mouth once daily.    Screening for colon cancer  -     Ambulatory referral/consult to Endo Procedure ; Future    Need for Tdap vaccination  -     (In Office Administered) Tdap Vaccine    Work up chest pain      Health maintenance reviewed with patient.     Follow up in about 6 months (around 8/6/2023).    Noah Monroy MD  Internal Medicine / Primary Care  [facility]  2/6/2023

## 2023-02-06 NOTE — PATIENT INSTRUCTIONS
Tetanus shot today  Schedule fasting labwork.  Schedule EKG, schedule treadmill stress echo  Schedule cardiology appointment with Dr. Erasmo Evangelista  Schedule pulmonary function testing    Colonoscopy - a colonoscopy has been ordered for you. In order to schedule this appointment, please call [phone].     Immunizations - shingles vaccination (shingrix, two shot series, 2-6 months apart), covid-19 bivalent booster    Return to clinic in 6 months or sooner if needed.

## 2023-02-07 ENCOUNTER — LAB VISIT (OUTPATIENT)
Dept: LAB | Facility: HOSPITAL | Age: 63
End: 2023-02-07
Attending: INTERNAL MEDICINE
Payer: COMMERCIAL

## 2023-02-07 ENCOUNTER — CLINICAL SUPPORT (OUTPATIENT)
Dept: INTERNAL MEDICINE | Facility: CLINIC | Age: 63
End: 2023-02-07
Payer: COMMERCIAL

## 2023-02-07 DIAGNOSIS — Z23 IMMUNIZATION DUE: ICD-10-CM

## 2023-02-07 DIAGNOSIS — Z00.00 ENCOUNTER FOR ANNUAL PHYSICAL EXAM: Primary | ICD-10-CM

## 2023-02-07 DIAGNOSIS — I25.10 ATHEROSCLEROSIS OF NATIVE CORONARY ARTERY OF NATIVE HEART, UNSPECIFIED WHETHER ANGINA PRESENT: ICD-10-CM

## 2023-02-07 DIAGNOSIS — I10 PRIMARY HYPERTENSION: ICD-10-CM

## 2023-02-07 DIAGNOSIS — E03.9 HYPOTHYROIDISM (ACQUIRED): ICD-10-CM

## 2023-02-07 LAB
ALBUMIN SERPL BCP-MCNC: 4.4 G/DL (ref 3.5–5.2)
ALP SERPL-CCNC: 60 U/L (ref 55–135)
ALT SERPL W/O P-5'-P-CCNC: 13 U/L (ref 10–44)
ANION GAP SERPL CALC-SCNC: 12 MMOL/L (ref 8–16)
AST SERPL-CCNC: 17 U/L (ref 10–40)
BASOPHILS # BLD AUTO: 0.04 K/UL (ref 0–0.2)
BASOPHILS NFR BLD: 0.6 % (ref 0–1.9)
BILIRUB SERPL-MCNC: 0.6 MG/DL (ref 0.1–1)
BUN SERPL-MCNC: 14 MG/DL (ref 8–23)
CALCIUM SERPL-MCNC: 10.2 MG/DL (ref 8.7–10.5)
CHLORIDE SERPL-SCNC: 102 MMOL/L (ref 95–110)
CHOLEST SERPL-MCNC: 164 MG/DL (ref 120–199)
CHOLEST/HDLC SERPL: 2.6 {RATIO} (ref 2–5)
CO2 SERPL-SCNC: 28 MMOL/L (ref 23–29)
CREAT SERPL-MCNC: 0.9 MG/DL (ref 0.5–1.4)
DIFFERENTIAL METHOD: ABNORMAL
EOSINOPHIL # BLD AUTO: 0.1 K/UL (ref 0–0.5)
EOSINOPHIL NFR BLD: 1.9 % (ref 0–8)
ERYTHROCYTE [DISTWIDTH] IN BLOOD BY AUTOMATED COUNT: 13.2 % (ref 11.5–14.5)
EST. GFR  (NO RACE VARIABLE): >60 ML/MIN/1.73 M^2
ESTIMATED AVG GLUCOSE: 105 MG/DL (ref 68–131)
GLUCOSE SERPL-MCNC: 95 MG/DL (ref 70–110)
HBA1C MFR BLD: 5.3 % (ref 4–5.6)
HCT VFR BLD AUTO: 47 % (ref 37–48.5)
HDLC SERPL-MCNC: 63 MG/DL (ref 40–75)
HDLC SERPL: 38.4 % (ref 20–50)
HGB BLD-MCNC: 15.2 G/DL (ref 12–16)
IMM GRANULOCYTES # BLD AUTO: 0.04 K/UL (ref 0–0.04)
IMM GRANULOCYTES NFR BLD AUTO: 0.6 % (ref 0–0.5)
LDLC SERPL CALC-MCNC: 85.6 MG/DL (ref 63–159)
LYMPHOCYTES # BLD AUTO: 1.8 K/UL (ref 1–4.8)
LYMPHOCYTES NFR BLD: 28.5 % (ref 18–48)
MCH RBC QN AUTO: 29.9 PG (ref 27–31)
MCHC RBC AUTO-ENTMCNC: 32.3 G/DL (ref 32–36)
MCV RBC AUTO: 93 FL (ref 82–98)
MONOCYTES # BLD AUTO: 0.6 K/UL (ref 0.3–1)
MONOCYTES NFR BLD: 9.3 % (ref 4–15)
NEUTROPHILS # BLD AUTO: 3.7 K/UL (ref 1.8–7.7)
NEUTROPHILS NFR BLD: 59.1 % (ref 38–73)
NONHDLC SERPL-MCNC: 101 MG/DL
NRBC BLD-RTO: 0 /100 WBC
PLATELET # BLD AUTO: 294 K/UL (ref 150–450)
PMV BLD AUTO: 10.4 FL (ref 9.2–12.9)
POTASSIUM SERPL-SCNC: 3.9 MMOL/L (ref 3.5–5.1)
PROT SERPL-MCNC: 7.4 G/DL (ref 6–8.4)
RBC # BLD AUTO: 5.08 M/UL (ref 4–5.4)
SODIUM SERPL-SCNC: 142 MMOL/L (ref 136–145)
TRIGL SERPL-MCNC: 77 MG/DL (ref 30–150)
TSH SERPL DL<=0.005 MIU/L-ACNC: 2.2 UIU/ML (ref 0.4–4)
WBC # BLD AUTO: 6.34 K/UL (ref 3.9–12.7)

## 2023-02-07 PROCEDURE — 80061 LIPID PANEL: CPT | Performed by: INTERNAL MEDICINE

## 2023-02-07 PROCEDURE — 80053 COMPREHEN METABOLIC PANEL: CPT | Performed by: INTERNAL MEDICINE

## 2023-02-07 PROCEDURE — 36415 COLL VENOUS BLD VENIPUNCTURE: CPT | Performed by: INTERNAL MEDICINE

## 2023-02-07 PROCEDURE — 90471 IMMUNIZATION ADMIN: CPT | Mod: S$GLB,,, | Performed by: INTERNAL MEDICINE

## 2023-02-07 PROCEDURE — 84443 ASSAY THYROID STIM HORMONE: CPT | Performed by: INTERNAL MEDICINE

## 2023-02-07 PROCEDURE — 83036 HEMOGLOBIN GLYCOSYLATED A1C: CPT | Performed by: INTERNAL MEDICINE

## 2023-02-07 PROCEDURE — 90715 TDAP VACCINE GREATER THAN OR EQUAL TO 7YO IM: ICD-10-PCS | Mod: S$GLB,,, | Performed by: INTERNAL MEDICINE

## 2023-02-07 PROCEDURE — 85025 COMPLETE CBC W/AUTO DIFF WBC: CPT | Performed by: INTERNAL MEDICINE

## 2023-02-07 PROCEDURE — 90715 TDAP VACCINE 7 YRS/> IM: CPT | Mod: S$GLB,,, | Performed by: INTERNAL MEDICINE

## 2023-02-07 PROCEDURE — 90471 TDAP VACCINE GREATER THAN OR EQUAL TO 7YO IM: ICD-10-PCS | Mod: S$GLB,,, | Performed by: INTERNAL MEDICINE

## 2023-02-07 NOTE — PROGRESS NOTES
After obtaining consent, and per orders of Dr. Monroy, injection of Tdap given IM by Brianne Ridley. Patient instructed to remain in clinic for 15 minutes afterwards, and to report any adverse reaction to staff immediately.

## 2023-05-01 ENCOUNTER — TELEPHONE (OUTPATIENT)
Dept: INTERNAL MEDICINE | Facility: CLINIC | Age: 63
End: 2023-05-01
Payer: MEDICARE

## 2023-05-01 ENCOUNTER — PATIENT MESSAGE (OUTPATIENT)
Dept: INTERNAL MEDICINE | Facility: CLINIC | Age: 63
End: 2023-05-01
Payer: MEDICARE

## 2023-05-01 NOTE — TELEPHONE ENCOUNTER
CT Chest Lung Screening Low Dose ordered 7/15/2022, completed on 01/17/2023    Was ordered without     Lung cancer screening, >= 20 pk-yr smoking history, risk factor(s) (Age >= 50y)   Dx: Cigarette nicotine dependence without complication [F17.210 (ICD-10-CM)]     As primary diagnosis    This led the patient to be billed for her low dose screening.     Would like to resubmit this order to billing department to see if her insurance will rescind the bill for this screening test.

## 2023-05-01 NOTE — TELEPHONE ENCOUNTER
----- Message from Noah Monroy MD sent at 5/1/2023  3:31 PM CDT -----  Forward to billing dept    ----- Message -----  From: Elle Odonnell LPN  Sent: 5/1/2023   1:02 PM CDT  To: Noah Monroy MD    Patient had a Ct Scan recently and states that it was coded incorrectly. Requesting that the coding be changed.

## 2023-05-02 ENCOUNTER — TELEPHONE (OUTPATIENT)
Dept: INTERNAL MEDICINE | Facility: CLINIC | Age: 63
End: 2023-05-02
Payer: MEDICARE

## 2023-05-04 NOTE — PROGRESS NOTES
Subjective:           Chief Complaint: No chief complaint on file.      HPI    62 year old female with coronary and aortic atherosclerosis noted on CT, HTN, hypothyroidism, MVP, history of tobacco abuse, mild emphysema here for the evaluation of chest discomfort. An exercise stress echo has been ordered.    She reports substernal chest discomfort over the past year, gradually improving, occurs at rest, typically not associated with physical exertion or emotional stress, but it has occurred when walking around [place] which prompts her to stop and rest, and typically resolving in about 5 minutes (with exertion or stress). Sometimes when it occurs at rest, she gets up and walks and the pain resolves. She walks 2.25 miles about 4 times per week without symptoms. She has 16 steps at home and becomes mildly short of breath when she climbs the stairs. She denies orthopnea, peripheral edema, syncope or claudication. She does report PND and snoring. She has noticed 1-2 minutes of palpitations occasionally, which is less frequent since quitting smoking, occurs about once daily. Previously she had a Holter with 650 PVCs.    She quit smoking one year ago, she drinks 3-4 times per week may have 4 glasses of wine or about 6 beers.    Brother had an MI in his 50's and needed PCI (heavy smoker/drinker and overweight). Father was cardioverted twice for AFib in his 70's.    Review of Systems  Pertinent findings as per HPI.        Objective:      Vitals:    05/10/23 0952   BP: (!) 152/67   Pulse: 61       Physical Exam  Constitutional:       Appearance: She is well-developed. She is not diaphoretic.   HENT:      Head: Normocephalic and atraumatic.   Eyes:      Pupils: Pupils are equal, round, and reactive to light.   Neck:      Vascular: No carotid bruit or JVD.   Cardiovascular:      Rate and Rhythm: Normal rate and regular rhythm.      Heart sounds: Normal heart sounds. Heart sounds not distant. No murmur heard.    No  friction rub. No gallop. No S3 or S4 sounds.   Pulmonary:      Effort: Pulmonary effort is normal. No respiratory distress.      Breath sounds: Normal breath sounds. No wheezing.   Abdominal:      General: Bowel sounds are normal. There is no distension.      Palpations: Abdomen is soft.      Tenderness: There is no abdominal tenderness.   Musculoskeletal:         General: No swelling.      Cervical back: Normal range of motion.   Skin:     General: Skin is warm.      Findings: No erythema.   Neurological:      Mental Status: She is alert and oriented to person, place, and time.   Psychiatric:         Behavior: Behavior normal.         Assessment:       1. Coronary artery calcification seen on CT scan    2. Aortic atherosclerosis    3. Primary hypertension          Plan     Coronary artery calcification seen on CT scan  Atypical symptoms but does have significant risk factors, check exercise stress echo  Taking ASA, rosuvastatin, losartan  LDL 85, she did not tolerate crestor (can only tolerate it every 3 days, pain resolves when she does not take it)  Start zetia  Stop crestor, may try another statin in the future    Palpitations  Check 48 hour Holter for almost daily palpitations  Referral to sleep medicine    Aortic atherosclerosis  As above    Primary hypertension  For blood pressure readings, sit in a calm, quiet room with dim lighting. Feet flat on the floor, without stimulation (no TV, no talking) and relax for at least 3-5 minutes prior to measuring.  Keep a BP log  Virtual visit in 2-3 months    She is interested in hormone therapy which is safe, rational and evidence based given her symptoms

## 2023-05-10 ENCOUNTER — OFFICE VISIT (OUTPATIENT)
Dept: CARDIOLOGY | Facility: CLINIC | Age: 63
End: 2023-05-10
Payer: MEDICARE

## 2023-05-10 ENCOUNTER — PATIENT MESSAGE (OUTPATIENT)
Dept: OBSTETRICS AND GYNECOLOGY | Facility: CLINIC | Age: 63
End: 2023-05-10
Payer: MEDICARE

## 2023-05-10 VITALS
BODY MASS INDEX: 23.95 KG/M2 | SYSTOLIC BLOOD PRESSURE: 152 MMHG | WEIGHT: 149.06 LBS | HEART RATE: 61 BPM | DIASTOLIC BLOOD PRESSURE: 67 MMHG | HEIGHT: 66 IN | OXYGEN SATURATION: 100 %

## 2023-05-10 DIAGNOSIS — I34.1 MITRAL VALVE PROLAPSE: ICD-10-CM

## 2023-05-10 DIAGNOSIS — I25.10 ATHEROSCLEROSIS OF NATIVE CORONARY ARTERY OF NATIVE HEART, UNSPECIFIED WHETHER ANGINA PRESENT: ICD-10-CM

## 2023-05-10 DIAGNOSIS — I10 PRIMARY HYPERTENSION: ICD-10-CM

## 2023-05-10 DIAGNOSIS — R00.2 PALPITATIONS: ICD-10-CM

## 2023-05-10 DIAGNOSIS — I25.10 CORONARY ARTERY CALCIFICATION SEEN ON CT SCAN: Primary | ICD-10-CM

## 2023-05-10 DIAGNOSIS — I70.0 AORTIC ATHEROSCLEROSIS: ICD-10-CM

## 2023-05-10 DIAGNOSIS — R01.1 CARDIAC MURMUR: ICD-10-CM

## 2023-05-10 PROCEDURE — 99204 PR OFFICE/OUTPT VISIT, NEW, LEVL IV, 45-59 MIN: ICD-10-PCS | Mod: S$PBB,,, | Performed by: INTERNAL MEDICINE

## 2023-05-10 PROCEDURE — 99999 PR PBB SHADOW E&M-EST. PATIENT-LVL V: ICD-10-PCS | Mod: PBBFAC,,, | Performed by: INTERNAL MEDICINE

## 2023-05-10 PROCEDURE — 99215 OFFICE O/P EST HI 40 MIN: CPT | Mod: PBBFAC | Performed by: INTERNAL MEDICINE

## 2023-05-10 PROCEDURE — 99204 OFFICE O/P NEW MOD 45 MIN: CPT | Mod: S$PBB,,, | Performed by: INTERNAL MEDICINE

## 2023-05-10 PROCEDURE — 99999 PR PBB SHADOW E&M-EST. PATIENT-LVL V: CPT | Mod: PBBFAC,,, | Performed by: INTERNAL MEDICINE

## 2023-05-10 RX ORDER — EZETIMIBE 10 MG/1
10 TABLET ORAL DAILY
Qty: 90 TABLET | Refills: 3 | Status: SHIPPED | OUTPATIENT
Start: 2023-05-10 | End: 2024-02-06 | Stop reason: SDUPTHER

## 2023-05-24 ENCOUNTER — CLINICAL SUPPORT (OUTPATIENT)
Dept: SMOKING CESSATION | Facility: CLINIC | Age: 63
End: 2023-05-24
Payer: COMMERCIAL

## 2023-05-24 DIAGNOSIS — F17.200 NICOTINE DEPENDENCE, UNCOMPLICATED: Primary | ICD-10-CM

## 2023-05-24 PROCEDURE — 99407 BEHAV CHNG SMOKING > 10 MIN: CPT | Mod: S$GLB,,, | Performed by: GENERAL PRACTICE

## 2023-05-24 PROCEDURE — 99407 PR TOBACCO USE CESSATION INTENSIVE >10 MINUTES: ICD-10-PCS | Mod: S$GLB,,, | Performed by: GENERAL PRACTICE

## 2023-05-24 NOTE — PROGRESS NOTES
Spoke with patient today in regard to smoking cessation progress for 12 month follow up. She states she is tobacco free. Congratulated patient on her success remaining quit. Informed patient of benefit period, future follow ups and contact information if any further help is needed. Will resolve smart form for 12 month follow up for Quit # 1.

## 2023-05-30 ENCOUNTER — CLINICAL SUPPORT (OUTPATIENT)
Dept: CARDIOLOGY | Facility: HOSPITAL | Age: 63
End: 2023-05-30
Attending: INTERNAL MEDICINE
Payer: MEDICARE

## 2023-05-30 ENCOUNTER — CLINICAL SUPPORT (OUTPATIENT)
Dept: ENDOSCOPY | Facility: HOSPITAL | Age: 63
End: 2023-05-30
Attending: INTERNAL MEDICINE
Payer: MEDICARE

## 2023-05-30 VITALS — BODY MASS INDEX: 23.95 KG/M2 | WEIGHT: 149 LBS | HEIGHT: 66 IN

## 2023-05-30 DIAGNOSIS — I25.10 CORONARY ARTERY CALCIFICATION SEEN ON CT SCAN: ICD-10-CM

## 2023-05-30 DIAGNOSIS — I70.0 AORTIC ATHEROSCLEROSIS: ICD-10-CM

## 2023-05-30 DIAGNOSIS — Z12.11 SCREENING FOR COLON CANCER: ICD-10-CM

## 2023-05-30 PROCEDURE — 93227 HOLTER MONITOR - 48 HOUR (CUPID ONLY): ICD-10-PCS | Mod: ,,, | Performed by: INTERNAL MEDICINE

## 2023-05-30 PROCEDURE — 93225 XTRNL ECG REC<48 HRS REC: CPT

## 2023-05-30 PROCEDURE — 93227 XTRNL ECG REC<48 HR R&I: CPT | Mod: ,,, | Performed by: INTERNAL MEDICINE

## 2023-05-30 RX ORDER — SODIUM, POTASSIUM,MAG SULFATES 17.5-3.13G
1 SOLUTION, RECONSTITUTED, ORAL ORAL DAILY
Qty: 1 KIT | Refills: 0 | Status: SHIPPED | OUTPATIENT
Start: 2023-05-30 | End: 2023-06-01

## 2023-06-02 LAB
OHS CV EVENT MONITOR DAY: 0
OHS CV HOLTER LENGTH DECIMAL HOURS: 48
OHS CV HOLTER LENGTH HOURS: 48
OHS CV HOLTER LENGTH MINUTES: 0
OHS CV HOLTER SINUS AVERAGE HR: 70
OHS CV HOLTER SINUS MAX HR: 125
OHS CV HOLTER SINUS MIN HR: 51

## 2023-06-05 ENCOUNTER — PATIENT MESSAGE (OUTPATIENT)
Dept: CARDIOLOGY | Facility: CLINIC | Age: 63
End: 2023-06-05
Payer: MEDICARE

## 2023-06-12 ENCOUNTER — LAB VISIT (OUTPATIENT)
Dept: LAB | Facility: OTHER | Age: 63
End: 2023-06-12
Attending: NURSE PRACTITIONER
Payer: MEDICARE

## 2023-06-12 ENCOUNTER — OFFICE VISIT (OUTPATIENT)
Dept: OBSTETRICS AND GYNECOLOGY | Facility: CLINIC | Age: 63
End: 2023-06-12
Payer: MEDICARE

## 2023-06-12 VITALS
WEIGHT: 148.38 LBS | BODY MASS INDEX: 23.84 KG/M2 | SYSTOLIC BLOOD PRESSURE: 149 MMHG | HEIGHT: 66 IN | DIASTOLIC BLOOD PRESSURE: 83 MMHG

## 2023-06-12 DIAGNOSIS — Z13.820 SCREENING FOR OSTEOPOROSIS: ICD-10-CM

## 2023-06-12 DIAGNOSIS — Z12.39 ENCOUNTER FOR SCREENING BREAST EXAMINATION: ICD-10-CM

## 2023-06-12 DIAGNOSIS — N95.1 MENOPAUSAL SYMPTOMS: ICD-10-CM

## 2023-06-12 DIAGNOSIS — M85.80 OTHER SPECIFIED DISORDERS OF BONE DENSITY AND STRUCTURE, UNSPECIFIED SITE: ICD-10-CM

## 2023-06-12 DIAGNOSIS — R53.83 OTHER FATIGUE: ICD-10-CM

## 2023-06-12 DIAGNOSIS — N64.9 DISORDER OF BREAST, UNSPECIFIED: ICD-10-CM

## 2023-06-12 DIAGNOSIS — E89.41 SYMPTOMATIC POSTPROCEDURAL OVARIAN FAILURE: ICD-10-CM

## 2023-06-12 DIAGNOSIS — N95.1 MENOPAUSAL SYMPTOMS: Primary | ICD-10-CM

## 2023-06-12 LAB
25(OH)D3+25(OH)D2 SERPL-MCNC: 21 NG/ML (ref 30–96)
ESTRADIOL SERPL-MCNC: 11 PG/ML
PROGEST SERPL-MCNC: 0.2 NG/ML
TESTOST SERPL-MCNC: 19 NG/DL (ref 5–73)
TSH SERPL DL<=0.005 MIU/L-ACNC: 1.49 UIU/ML (ref 0.4–4)

## 2023-06-12 PROCEDURE — 82306 VITAMIN D 25 HYDROXY: CPT | Performed by: NURSE PRACTITIONER

## 2023-06-12 PROCEDURE — 36415 COLL VENOUS BLD VENIPUNCTURE: CPT | Performed by: NURSE PRACTITIONER

## 2023-06-12 PROCEDURE — 84443 ASSAY THYROID STIM HORMONE: CPT | Performed by: NURSE PRACTITIONER

## 2023-06-12 PROCEDURE — 84144 ASSAY OF PROGESTERONE: CPT | Performed by: NURSE PRACTITIONER

## 2023-06-12 PROCEDURE — 84403 ASSAY OF TOTAL TESTOSTERONE: CPT | Performed by: NURSE PRACTITIONER

## 2023-06-12 PROCEDURE — 99214 PR OFFICE/OUTPT VISIT, EST, LEVL IV, 30-39 MIN: ICD-10-PCS | Mod: S$PBB,,, | Performed by: NURSE PRACTITIONER

## 2023-06-12 PROCEDURE — 99214 OFFICE O/P EST MOD 30 MIN: CPT | Mod: S$PBB,,, | Performed by: NURSE PRACTITIONER

## 2023-06-12 PROCEDURE — 99999 PR PBB SHADOW E&M-EST. PATIENT-LVL III: CPT | Mod: PBBFAC,,, | Performed by: NURSE PRACTITIONER

## 2023-06-12 PROCEDURE — 99213 OFFICE O/P EST LOW 20 MIN: CPT | Mod: PBBFAC | Performed by: NURSE PRACTITIONER

## 2023-06-12 PROCEDURE — 99999 PR PBB SHADOW E&M-EST. PATIENT-LVL III: ICD-10-PCS | Mod: PBBFAC,,, | Performed by: NURSE PRACTITIONER

## 2023-06-12 PROCEDURE — 84402 ASSAY OF FREE TESTOSTERONE: CPT | Performed by: NURSE PRACTITIONER

## 2023-06-12 PROCEDURE — 82670 ASSAY OF TOTAL ESTRADIOL: CPT | Performed by: NURSE PRACTITIONER

## 2023-06-12 RX ORDER — SODIUM, POTASSIUM,MAG SULFATES 17.5-3.13G
SOLUTION, RECONSTITUTED, ORAL ORAL
COMMUNITY
Start: 2023-06-06 | End: 2023-10-31

## 2023-06-12 NOTE — PROGRESS NOTES
Subjective:      Nora Burger is a 63 y.o. female who presents to discuss hormone replacement therapy.  Menarche occurred at age 11 and the patient went into menopause following hysterectomy with BSO, occurred 10+ years ago. Patient is requesting hormone replacement therapy due to hot flashes, insomnia, moodiness, no energy, and hysterectomy with BSO, anxiety, decreased libido.The patient is not currently taking hormone replacement therapy. Patient denies post-menopausal vaginal bleeding. The patient is sexually active.  She denies the following contraindications to HRT:  Vaginal bleeding, history of VTE/PE, thrombophilia,  breast cancer, or active liver disease.       Prior use of oral Estrogen following hysterectomy. Use of HRT pellets in the past for several cycles. Last pellet therapy of testosterone and estradiol was 12+ months ago. Noted side effects with pellets and desires more nuanced approach to HRT.       Hemoglobin A1C   Date Value Ref Range Status   02/07/2023 5.3 4.0 - 5.6 % Final     Comment:     ADA Screening Guidelines:  5.7-6.4%  Consistent with prediabetes  >or=6.5%  Consistent with diabetes    High levels of fetal hemoglobin interfere with the HbA1C  assay. Heterozygous hemoglobin variants (HbS, HgC, etc)do  not significantly interfere with this assay.   However, presence of multiple variants may affect accuracy.     03/30/2022 5.2 4.0 - 5.6 % Final     Comment:     ADA Screening Guidelines:  5.7-6.4%  Consistent with prediabetes  >or=6.5%  Consistent with diabetes    High levels of fetal hemoglobin interfere with the HbA1C  assay. Heterozygous hemoglobin variants (HbS, HgC, etc)do  not significantly interfere with this assay.   However, presence of multiple variants may affect accuracy.        TSH       Lab Visit on 06/12/2023   Component Date Value Ref Range Status    TSH 06/12/2023 1.493  0.400 - 4.000 uIU/mL Final   Clinical Support on 05/30/2023   Component Date Value Ref Range Status     Holter length hours 2023 48   Final    holter length minutes 2023 0   Final    holter length dec hours 2023 48.00   Final    Sinus min HR 2023 51   Final    Sinus max hr 2023 125   Final    Sinus avg hr 2023 70   Final    Event Monitor Day 2023 0   Final       Past Medical History:   Diagnosis Date    Hypertension     Hypothyroidism, unspecified      Past Surgical History:   Procedure Laterality Date    AUGMENTATION OF BREAST Bilateral     approx 6 years siicone    HYSTERECTOMY      age mid 40's or 50's DUB, endo.    OOPHORECTOMY       Social History     Tobacco Use    Smoking status: Former     Packs/day: 1.00     Years: 49.00     Pack years: 49.00     Types: Cigarettes     Quit date: 2022     Years since quittin.1   Substance Use Topics    Alcohol use: Yes     Alcohol/week: 1.0 standard drink     Types: 1 Cans of beer per week     Comment: occ    Drug use: Not Currently     Family History   Problem Relation Age of Onset    Colon cancer Paternal Grandmother     Breast cancer Neg Hx     Ovarian cancer Neg Hx     Uterine cancer Neg Hx     Cervical cancer Neg Hx      OB History    Para Term  AB Living   2 2 2         SAB IAB Ectopic Multiple Live Births                  # Outcome Date GA Lbr Eitan/2nd Weight Sex Delivery Anes PTL Lv   2 Term            1 Term                Current Outpatient Medications:     aspirin (ECOTRIN) 81 MG EC tablet, Take 81 mg by mouth once daily., Disp: , Rfl:     buPROPion (WELLBUTRIN XL) 150 MG TB24 tablet, Take 1 tablet (150 mg total) by mouth 2 (two) times a day., Disp: 180 tablet, Rfl: 3    diltiaZEM (CARDIZEM CD) 240 MG 24 hr capsule, Take 1 capsule (240 mg total) by mouth once daily., Disp: 90 capsule, Rfl: 3    ezetimibe (ZETIA) 10 mg tablet, Take 1 tablet (10 mg total) by mouth once daily., Disp: 90 tablet, Rfl: 3    hydroCHLOROthiazide (HYDRODIURIL) 25 MG tablet, Take 1 tablet (25 mg total) by mouth once daily., Disp:  "90 tablet, Rfl: 3    levothyroxine (SYNTHROID) 50 MCG tablet, Take 1 tablet (50 mcg total) by mouth before breakfast., Disp: 90 tablet, Rfl: 3    losartan (COZAAR) 50 MG tablet, Take 1 tablet (50 mg total) by mouth once daily., Disp: 90 tablet, Rfl: 3    sodium,potassium,mag sulfates (SUPREP BOWEL PREP KIT) 17.5-3.13-1.6 gram SolR, Take by mouth., Disp: , Rfl:     Vitals:    06/12/23 1021   BP: (!) 149/83   Weight: 67.3 kg (148 lb 6.4 oz)   Height: 5' 6" (1.676 m)   PainSc:   3   PainLoc: Hip     Body mass index is 23.95 kg/m².    Assessment:    Menopausal symptoms  -     ESTRADIOL; Future; Expected date: 06/12/2023  -     PROGESTERONE; Future; Expected date: 06/12/2023  -     Testosterone, free; Future; Expected date: 06/12/2023  -     TESTOSTERONE; Future; Expected date: 06/12/2023  -     TSH; Future; Expected date: 06/12/2023  -     Vitamin D 25 hydroxy; Future; Expected date: 06/12/2023    Other fatigue  -     TSH; Future; Expected date: 06/12/2023    Other specified disorders of bone density and structure, unspecified site  -     Vitamin D 25 hydroxy; Future; Expected date: 06/12/2023  -     DXA Bone Density Axial Skeleton 1 or more sites; Future; Expected date: 06/12/2023    Screening for osteoporosis  -     DXA Bone Density Axial Skeleton 1 or more sites; Future; Expected date: 06/12/2023    Symptomatic postprocedural ovarian failure  -     DXA Bone Density Axial Skeleton 1 or more sites; Future; Expected date: 06/12/2023    Encounter for screening breast examination  -     Mammo Digital Diagnostic Bilat with Olaf; Future; Expected date: 06/12/2023    Disorder of breast, unspecified  -     Mammo Digital Diagnostic Bilat with Olaf; Future; Expected date: 06/12/2023        Plan:   Risks and benefits of hormone replacement therapy were discussed.  Hormone replacement therapy options, including bioidentical versus non-bioidentical hormones, as well as alternatives discussed.    HRT lab work today, plan for HRT FU " and NOAH in 2-3 weeks.    Instructed patient to call if she has any questions.       BARON Duke

## 2023-06-19 ENCOUNTER — OFFICE VISIT (OUTPATIENT)
Dept: OBSTETRICS AND GYNECOLOGY | Facility: CLINIC | Age: 63
End: 2023-06-19
Payer: MEDICARE

## 2023-06-19 ENCOUNTER — TELEPHONE (OUTPATIENT)
Dept: OBSTETRICS AND GYNECOLOGY | Facility: CLINIC | Age: 63
End: 2023-06-19
Payer: MEDICARE

## 2023-06-19 VITALS
HEART RATE: 62 BPM | DIASTOLIC BLOOD PRESSURE: 82 MMHG | WEIGHT: 147 LBS | HEIGHT: 66 IN | BODY MASS INDEX: 23.63 KG/M2 | SYSTOLIC BLOOD PRESSURE: 134 MMHG

## 2023-06-19 DIAGNOSIS — N81.10 FEMALE BLADDER PROLAPSE: ICD-10-CM

## 2023-06-19 DIAGNOSIS — Z01.419 WOMEN'S ANNUAL ROUTINE GYNECOLOGICAL EXAMINATION: Primary | ICD-10-CM

## 2023-06-19 DIAGNOSIS — N39.3 STRESS INCONTINENCE IN FEMALE: ICD-10-CM

## 2023-06-19 DIAGNOSIS — N95.1 MENOPAUSAL SYMPTOMS: ICD-10-CM

## 2023-06-19 DIAGNOSIS — E55.9 VITAMIN D DEFICIENCY: ICD-10-CM

## 2023-06-19 DIAGNOSIS — R68.82 LOW LIBIDO: Primary | ICD-10-CM

## 2023-06-19 PROBLEM — F17.210 CIGARETTE NICOTINE DEPENDENCE WITHOUT COMPLICATION: Status: RESOLVED | Noted: 2022-03-30 | Resolved: 2023-06-19

## 2023-06-19 LAB — TESTOST FREE SERPL-MCNC: <0.4 PG/ML

## 2023-06-19 PROCEDURE — 99999 PR PBB SHADOW E&M-EST. PATIENT-LVL III: CPT | Mod: PBBFAC,,, | Performed by: NURSE PRACTITIONER

## 2023-06-19 PROCEDURE — G0101 CA SCREEN;PELVIC/BREAST EXAM: HCPCS | Mod: PBBFAC | Performed by: NURSE PRACTITIONER

## 2023-06-19 PROCEDURE — 99213 OFFICE O/P EST LOW 20 MIN: CPT | Mod: PBBFAC,25 | Performed by: NURSE PRACTITIONER

## 2023-06-19 PROCEDURE — G0101 CA SCREEN;PELVIC/BREAST EXAM: HCPCS | Mod: S$PBB,,, | Performed by: NURSE PRACTITIONER

## 2023-06-19 PROCEDURE — 99999 PR PBB SHADOW E&M-EST. PATIENT-LVL III: ICD-10-PCS | Mod: PBBFAC,,, | Performed by: NURSE PRACTITIONER

## 2023-06-19 PROCEDURE — G0101 PR CA SCREEN;PELVIC/BREAST EXAM: ICD-10-PCS | Mod: S$PBB,,, | Performed by: NURSE PRACTITIONER

## 2023-06-19 RX ORDER — ESTRADIOL 0.05 MG/D
1 FILM, EXTENDED RELEASE TRANSDERMAL
Qty: 8 PATCH | Refills: 11 | Status: SHIPPED | OUTPATIENT
Start: 2023-06-19 | End: 2024-02-19

## 2023-06-19 RX ORDER — ERGOCALCIFEROL 1.25 MG/1
50000 CAPSULE ORAL
Qty: 12 CAPSULE | Refills: 3 | Status: SHIPPED | OUTPATIENT
Start: 2023-06-19 | End: 2024-01-22

## 2023-06-19 RX ORDER — ESTRADIOL 0.05 MG/D
1 FILM, EXTENDED RELEASE TRANSDERMAL WEEKLY
Qty: 4 PATCH | Refills: 11 | Status: SHIPPED | OUTPATIENT
Start: 2023-06-19 | End: 2023-06-19

## 2023-06-19 RX ORDER — TESTOSTERONE CYPIONATE 200 MG/ML
50 INJECTION, SOLUTION INTRAMUSCULAR
Status: SHIPPED | OUTPATIENT
Start: 2023-06-20 | End: 2023-10-10

## 2023-06-19 RX ORDER — PROGESTERONE 100 MG/1
100 CAPSULE ORAL NIGHTLY
Qty: 30 CAPSULE | Refills: 11 | Status: SHIPPED | OUTPATIENT
Start: 2023-06-19 | End: 2024-01-22

## 2023-06-19 NOTE — PROGRESS NOTES
CC: Annual    HPI: Pt is a 63 y.o.  female who presents for routine annual exam. She is menopausal, post hysterectomy.  The patient participates in regular exercise: Yes.  The patient does not smoke.  The patient wears seatbelts.   Pt denies any domestic violence.     Presents additionally to discuss therapy options for HRT. She has started daily vitamin D. Also reports stress urinary incontinence.       FH:  Breast cancer: none  Colon cancer: none  Ovarian cancer: none  Endometrial cancer: none    ROS:  GENERAL: Feeling well overall. Denies fever or chills.   SKIN: Denies rash or lesions.   HEAD: Denies head injury or headache.   NODES: Denies enlarged lymph nodes.   CHEST: Denies chest pain or shortness of breath.   CARDIOVASCULAR: Denies palpitations or left sided chest pain.   ABDOMEN: No abdominal pain, constipation, diarrhea, nausea, vomiting or rectal bleeding.   URINARY: No dysuria, hematuria, or burning on urination.  REPRODUCTIVE: See HPI.   BREASTS: Denies pain, lumps, or nipple discharge.   HEMATOLOGIC: No easy bruisability or excessive bleeding.   MUSCULOSKELETAL: Denies joint pain or swelling.   NEUROLOGIC: Denies syncope or weakness.   PSYCHIATRIC: Denies depression, anxiety or mood swings.    PE:   APPEARANCE: Well nourished, well developed, White female in no acute distress.  NODES: no cervical, supraclavicular, or inguinal lymphadenopathy  BREASTS: Symmetrical, no skin changes or visible lesions. No palpable masses, nipple discharge or adenopathy bilaterally. Implants palpable bilaterally.   ABDOMEN: Soft. No tenderness or masses. No distention. No hernias palpated. No CVA tenderness.  VULVA: No lesions. Normal external female genitalia.  URETHRAL MEATUS: Normal size and location, no lesions, no prolapse.  URETHRA: No masses, tenderness, or prolapse.  VAGINA: Moist. No lesions or lacerations noted. No abnormal discharge present. No odor present. Stage 1 bladder prolapse.  CERVIX: Surgically  absent  UTERUS: Surgically absent.  ADNEXA: No tenderness. No fullness or masses palpated in the adnexal regions.   ANUS PERINEUM: Normal.      Diagnosis:  1. Women's annual routine gynecological examination    2. Menopausal symptoms    3. Vitamin D deficiency    4. Stress incontinence in female    5. Female bladder prolapse        Plan:     Orders Placed This Encounter    Ambulatory referral/consult to Physical/Occupational Therapy    progesterone (PROMETRIUM) 100 MG capsule    estradiol 0.05 mg/24 hr td ptsw (VIVELLE-DOT) 0.05 mg/24 hr    ergocalciferol (VITAMIN D2) 50,000 unit Cap     Vitamin D deficient- Vitamin D2 50,000 units weekly x 3 months.     Scripts for Vivelle Dot and Prometrium to pharmacy, awaiting free testosterone level for initiation of testosterone injections. FDA warning for MI, stroke, and DVT reviewed.  Patient is aware this is off-label use.    Referral to PFT for stress incontinence. Discussed mild bladder prolapse.     Patient was counseled today on the new ACS guidelines for cervical cytology screening as well as the current recommendations for breast cancer screening. She was counseled to follow up with her PCP for other routine health maintenance. Counseling session lasted approximately 10 minutes, and all her questions were answered.    Follow-up with me in 1 year for routine exam.       BARON Duke

## 2023-06-21 ENCOUNTER — PATIENT MESSAGE (OUTPATIENT)
Dept: OBSTETRICS AND GYNECOLOGY | Facility: CLINIC | Age: 63
End: 2023-06-21
Payer: MEDICARE

## 2023-06-21 DIAGNOSIS — R68.82 LOW LIBIDO: Primary | ICD-10-CM

## 2023-06-23 NOTE — TELEPHONE ENCOUNTER
Called and spoke to Evangelina ESPINAL in billing who sent the new diagnosis code from Dr Monroy for Ct lung scan to her insurance company for rebilling. Notified patient of process. Informed her to call the clinic if needed too.   Called by nursing staff to come to the hospital to talk to large group of family members about upcoming urologic care suggested that [facility] u[facility] speak to them about their plan which I see has been discussed  will be here for any immediate urologic procedure for patient in the hospital if urologic intervention is warranted patient will be discharged and scheduled for cystoscopy and prostate biopsy under [facility]'s urology care as early as next week

## 2023-06-27 ENCOUNTER — HOSPITAL ENCOUNTER (OUTPATIENT)
Dept: RADIOLOGY | Facility: OTHER | Age: 63
Discharge: HOME | End: 2023-06-27
Attending: NURSE PRACTITIONER
Payer: MEDICARE

## 2023-06-27 DIAGNOSIS — M85.80 OTHER SPECIFIED DISORDERS OF BONE DENSITY AND STRUCTURE, UNSPECIFIED SITE: ICD-10-CM

## 2023-06-27 DIAGNOSIS — Z13.820 SCREENING FOR OSTEOPOROSIS: ICD-10-CM

## 2023-06-27 DIAGNOSIS — E89.41 SYMPTOMATIC POSTPROCEDURAL OVARIAN FAILURE: ICD-10-CM

## 2023-06-27 PROCEDURE — 77080 DXA BONE DENSITY AXIAL: CPT | Mod: 26,,, | Performed by: RADIOLOGY

## 2023-06-27 PROCEDURE — 77080 DXA BONE DENSITY AXIAL: CPT | Mod: TC

## 2023-06-27 PROCEDURE — 77080 DXA BONE DENSITY AXIAL SKELETON 1 OR MORE SITES: ICD-10-PCS | Mod: 26,,, | Performed by: RADIOLOGY

## 2023-06-28 ENCOUNTER — CLINICAL SUPPORT (OUTPATIENT)
Dept: OBSTETRICS AND GYNECOLOGY | Facility: CLINIC | Age: 63
End: 2023-06-28
Payer: MEDICARE

## 2023-06-28 DIAGNOSIS — R68.82 LOW LIBIDO: Primary | ICD-10-CM

## 2023-06-28 PROCEDURE — 96372 THER/PROPH/DIAG INJ SC/IM: CPT | Mod: PBBFAC

## 2023-06-28 PROCEDURE — 99999 PR PBB SHADOW E&M-EST. PATIENT-LVL I: CPT | Mod: PBBFAC,,,

## 2023-06-28 PROCEDURE — 99999 PR PBB SHADOW E&M-EST. PATIENT-LVL I: ICD-10-PCS | Mod: PBBFAC,,,

## 2023-06-28 RX ADMIN — TESTOSTERONE CYPIONATE 50 MG: 200 INJECTION, SOLUTION INTRAMUSCULAR at 08:06

## 2023-07-03 ENCOUNTER — TELEPHONE (OUTPATIENT)
Dept: OBSTETRICS AND GYNECOLOGY | Facility: CLINIC | Age: 63
End: 2023-07-03
Payer: MEDICARE

## 2023-07-03 DIAGNOSIS — M85.851 OSTEOPENIA OF NECK OF RIGHT FEMUR: ICD-10-CM

## 2023-07-03 PROBLEM — M85.859 OSTEOPENIA OF NECK OF FEMUR: Status: ACTIVE | Noted: 2023-07-03

## 2023-07-06 ENCOUNTER — PATIENT MESSAGE (OUTPATIENT)
Dept: CARDIOLOGY | Facility: CLINIC | Age: 63
End: 2023-07-06
Payer: MEDICARE

## 2023-07-06 ENCOUNTER — HOSPITAL ENCOUNTER (OUTPATIENT)
Dept: CARDIOLOGY | Facility: HOSPITAL | Age: 63
Discharge: HOME | End: 2023-07-06
Attending: INTERNAL MEDICINE
Payer: MEDICARE

## 2023-07-06 VITALS — HEIGHT: 66 IN | BODY MASS INDEX: 23.78 KG/M2 | WEIGHT: 148 LBS

## 2023-07-06 DIAGNOSIS — I25.10 CORONARY ARTERY CALCIFICATION SEEN ON CT SCAN: ICD-10-CM

## 2023-07-06 DIAGNOSIS — I70.0 AORTIC ATHEROSCLEROSIS: ICD-10-CM

## 2023-07-06 LAB
ASCENDING AORTA: 2.44 CM
BSA FOR ECHO PROCEDURE: 1.77 M2
CV ECHO LV RWT: 0.42 CM
CV STRESS BASE HR: 62 BPM
DIASTOLIC BLOOD PRESSURE: 76 MMHG
DOP CALC LVOT AREA: 3.4 CM2
DOP CALC LVOT DIAMETER: 2.07 CM
DOP CALC LVOT PEAK VEL: 1.35 M/S
DOP CALC LVOT STROKE VOLUME: 94.59 CM3
DOP CALCLVOT PEAK VEL VTI: 28.12 CM
E WAVE DECELERATION TIME: 243.36 MSEC
E/A RATIO: 0.98
E/E' RATIO: 7.08 M/S
ECHO LV POSTERIOR WALL: 0.86 CM (ref 0.6–1.1)
EJECTION FRACTION: 65 %
FRACTIONAL SHORTENING: 27 % (ref 28–44)
INTERVENTRICULAR SEPTUM: 0.6 CM (ref 0.6–1.1)
IVRT: 97.05 MSEC
LA MAJOR: 5.54 CM
LA MINOR: 5.54 CM
LA WIDTH: 3.64 CM
LEFT ATRIUM SIZE: 3.2 CM
LEFT ATRIUM VOLUME INDEX MOD: 24.7 ML/M2
LEFT ATRIUM VOLUME INDEX: 31.2 ML/M2
LEFT ATRIUM VOLUME MOD: 43.41 CM3
LEFT ATRIUM VOLUME: 54.85 CM3
LEFT INTERNAL DIMENSION IN SYSTOLE: 3 CM (ref 2.1–4)
LEFT VENTRICLE DIASTOLIC VOLUME INDEX: 42.17 ML/M2
LEFT VENTRICLE DIASTOLIC VOLUME: 74.22 ML
LEFT VENTRICLE MASS INDEX: 49 G/M2
LEFT VENTRICLE SYSTOLIC VOLUME INDEX: 19.9 ML/M2
LEFT VENTRICLE SYSTOLIC VOLUME: 35.1 ML
LEFT VENTRICULAR INTERNAL DIMENSION IN DIASTOLE: 4.1 CM (ref 3.5–6)
LEFT VENTRICULAR MASS: 86.26 G
LV LATERAL E/E' RATIO: 6.54 M/S
LV SEPTAL E/E' RATIO: 7.73 M/S
MV A" WAVE DURATION": 7.99 MSEC
MV PEAK A VEL: 0.87 M/S
MV PEAK E VEL: 0.85 M/S
MV STENOSIS PRESSURE HALF TIME: 70.58 MS
MV VALVE AREA P 1/2 METHOD: 3.12 CM2
OHS CV CPX 1 MINUTE RECOVERY HEART RATE: 93 BPM
OHS CV CPX 85 PERCENT MAX PREDICTED HEART RATE MALE: 126
OHS CV CPX ESTIMATED METS: 12
OHS CV CPX MAX PREDICTED HEART RATE: 149
OHS CV CPX PATIENT IS FEMALE: 1
OHS CV CPX PATIENT IS MALE: 0
OHS CV CPX PEAK DIASTOLIC BLOOD PRESSURE: 72 MMHG
OHS CV CPX PEAK HEAR RATE: 133 BPM
OHS CV CPX PEAK RATE PRESSURE PRODUCT: NORMAL
OHS CV CPX PEAK SYSTOLIC BLOOD PRESSURE: 182 MMHG
OHS CV CPX PERCENT MAX PREDICTED HEART RATE ACHIEVED: 89
OHS CV CPX RATE PRESSURE PRODUCT PRESENTING: 8618
PISA TR MAX VEL: 2.36 M/S
PULM VEIN S/D RATIO: 1.7
PV PEAK D VEL: 0.37 M/S
PV PEAK S VEL: 0.63 M/S
RA MAJOR: 5.07 CM
RA PRESSURE: 3 MMHG
RA WIDTH: 3.7 CM
RIGHT VENTRICULAR END-DIASTOLIC DIMENSION: 3.59 CM
SINUS: 2.81 CM
STJ: 2.18 CM
STRESS ECHO POST EXERCISE DUR MIN: 7 MINUTES
STRESS ECHO POST EXERCISE DUR SEC: 30 SECONDS
SYSTOLIC BLOOD PRESSURE: 139 MMHG
TDI LATERAL: 0.13 M/S
TDI SEPTAL: 0.11 M/S
TDI: 0.12 M/S
TR MAX PG: 22 MMHG
TRICUSPID ANNULAR PLANE SYSTOLIC EXCURSION: 2.59 CM
TV REST PULMONARY ARTERY PRESSURE: 25 MMHG

## 2023-07-06 PROCEDURE — 93351 STRESS TTE COMPLETE: CPT

## 2023-07-06 PROCEDURE — 93351 STRESS TTE COMPLETE: CPT | Mod: 26,,, | Performed by: INTERNAL MEDICINE

## 2023-07-06 PROCEDURE — 93351 STRESS ECHO (CUPID ONLY): ICD-10-PCS | Mod: 26,,, | Performed by: INTERNAL MEDICINE

## 2023-07-07 ENCOUNTER — HOSPITAL ENCOUNTER (OUTPATIENT)
Dept: RADIOLOGY | Facility: OTHER | Age: 63
Discharge: HOME | End: 2023-07-07
Attending: NURSE PRACTITIONER
Payer: MEDICARE

## 2023-07-07 DIAGNOSIS — Z12.31 VISIT FOR SCREENING MAMMOGRAM: ICD-10-CM

## 2023-07-07 PROCEDURE — 77063 MAMMO DIGITAL SCREENING BILAT WITH TOMO: ICD-10-PCS | Mod: 26,,, | Performed by: RADIOLOGY

## 2023-07-07 PROCEDURE — 77067 SCR MAMMO BI INCL CAD: CPT | Mod: 26,,, | Performed by: RADIOLOGY

## 2023-07-07 PROCEDURE — 77063 BREAST TOMOSYNTHESIS BI: CPT | Mod: 26,,, | Performed by: RADIOLOGY

## 2023-07-07 PROCEDURE — 77067 SCR MAMMO BI INCL CAD: CPT | Mod: TC

## 2023-07-07 PROCEDURE — 77067 MAMMO DIGITAL SCREENING BILAT WITH TOMO: ICD-10-PCS | Mod: 26,,, | Performed by: RADIOLOGY

## 2023-07-10 ENCOUNTER — OFFICE VISIT (OUTPATIENT)
Dept: OBSTETRICS AND GYNECOLOGY | Facility: CLINIC | Age: 63
End: 2023-07-10
Payer: MEDICARE

## 2023-07-10 ENCOUNTER — PATIENT MESSAGE (OUTPATIENT)
Dept: CARDIOLOGY | Facility: CLINIC | Age: 63
End: 2023-07-10
Payer: MEDICARE

## 2023-07-10 VITALS
SYSTOLIC BLOOD PRESSURE: 137 MMHG | BODY MASS INDEX: 24.11 KG/M2 | HEIGHT: 66 IN | DIASTOLIC BLOOD PRESSURE: 78 MMHG | WEIGHT: 150 LBS | HEART RATE: 63 BPM

## 2023-07-10 DIAGNOSIS — M85.80 OTHER SPECIFIED DISORDERS OF BONE DENSITY AND STRUCTURE, UNSPECIFIED SITE: ICD-10-CM

## 2023-07-10 DIAGNOSIS — K22.2 ESOPHAGEAL STRICTURE: ICD-10-CM

## 2023-07-10 DIAGNOSIS — M85.852 OSTEOPENIA OF NECKS OF BOTH FEMURS: Primary | ICD-10-CM

## 2023-07-10 DIAGNOSIS — M85.851 OSTEOPENIA OF NECKS OF BOTH FEMURS: Primary | ICD-10-CM

## 2023-07-10 PROCEDURE — 99214 OFFICE O/P EST MOD 30 MIN: CPT | Mod: S$PBB,,, | Performed by: NURSE PRACTITIONER

## 2023-07-10 PROCEDURE — 99214 PR OFFICE/OUTPT VISIT, EST, LEVL IV, 30-39 MIN: ICD-10-PCS | Mod: S$PBB,,, | Performed by: NURSE PRACTITIONER

## 2023-07-10 PROCEDURE — 99213 OFFICE O/P EST LOW 20 MIN: CPT | Mod: PBBFAC | Performed by: NURSE PRACTITIONER

## 2023-07-10 PROCEDURE — 99999 PR PBB SHADOW E&M-EST. PATIENT-LVL III: CPT | Mod: PBBFAC,,, | Performed by: NURSE PRACTITIONER

## 2023-07-10 PROCEDURE — 99999 PR PBB SHADOW E&M-EST. PATIENT-LVL III: ICD-10-PCS | Mod: PBBFAC,,, | Performed by: NURSE PRACTITIONER

## 2023-07-10 NOTE — PROGRESS NOTES
Chief Complaint   Patient presents with    Well Woman     Well woman        History of Present Illness: Nora Burger is a 63 y.o. female that presents today 7/10/2023 for a bone density consultation. Her recent bone density scan showed osteopenia to the lumbar spine and bilateral femoral neck. Non smoker, limited alcohol intake. No history of personal/familial idiopathic or non-traumatic fracture. She is taking Vitamin D supplementation. Multiple family members with osteoporosis, father included. Known history of esophageal stricture.       Chief Complaint   Patient presents with    Well Woman     Well woman        Bone Density Results:   Results for orders placed during the hospital encounter of 06/27/23    DXA Bone Density Axial Skeleton 1 or more sites    Narrative  EXAMINATION:  DXA BONE DENSITY AXIAL SKELETON 1 OR MORE SITES    CLINICAL HISTORY:  Other specified disorders of bone density and structure, unspecified site    TECHNIQUE:  DXA scanning was performed over the hips and lumbar spine.  Review of the images confirms satisfactory positioning and technique.    COMPARISON:  None    FINDINGS:  The bone mineral density measured from L1 through L4 is 1.033g/cm2.  This corresponds to a T score of -1.3 and a Z score of 0.2.    The bone mineral density within the left femoral neck measures 0.871 g/cm2.  This corresponds to a T score of -1.2 and a Z score of 0.2.    The bone mineral density within the right femoral neck measures 0.791 g/cm2.  This corresponds to a T score of -1.8 and a Z score of -0.4.    FRAX RESULTS:    10-year Probability of Fracture:    Major Osteoporotic Fracture 29.2%.    Hip Fracture 2.3%.    Impression  Osteopenia lumbar spine and both hips.    Consider FDA approved medical therapies in postmenopausal women and men aged 50 years and older, based on the following:    *A hip or vertebral (clinical or morphometric) fracture  *T score less than or equal to -2.5 at the femoral neck or spine  after appropriate evaluation to exclude secondary causes.  *Low bone mass -- also known as osteopenia (T score between -1.0 and -2.5 at the femoral neck or spine) and a 10 year probability of hip fracture greater than or equal to 3% or a 10 year probability of major osteoporosis-related fracture greater than or equal to 20% based on the [place]-adapted WHO algorithm.  *Clinician's judgment and/or patient preference may indicate treatment for people with 10 year fracture probabilities is above or below these levels.      Electronically signed by: Pricila Godinez  Date:    06/27/2023  Time:    14:48           LABS:  Last Calcium   Lab Results   Component Value Date    CALCIUM 10.2 02/07/2023       Last Vitamin D   Lab Results   Component Value Date    GLYGIYWW22QY 21 (L) 06/12/2023             Past Medical History:   Diagnosis Date    Hypertension     Hypothyroidism, unspecified        Past Surgical History:   Procedure Laterality Date    AUGMENTATION OF BREAST Bilateral     approx 6 years siicone    HYSTERECTOMY      age mid 40's or 50's DUB, endo.    OOPHORECTOMY         Current Outpatient Medications   Medication Sig Dispense Refill    aspirin (ECOTRIN) 81 MG EC tablet Take 81 mg by mouth once daily.      buPROPion (WELLBUTRIN XL) 150 MG TB24 tablet Take 1 tablet (150 mg total) by mouth 2 (two) times a day. 180 tablet 3    diltiaZEM (CARDIZEM CD) 240 MG 24 hr capsule Take 1 capsule (240 mg total) by mouth once daily. 90 capsule 3    ergocalciferol (VITAMIN D2) 50,000 unit Cap Take 1 capsule (50,000 Units total) by mouth every 7 days. 12 capsule 3    estradiol 0.05 mg/24 hr td ptsw (VIVELLE-DOT) 0.05 mg/24 hr Place 1 patch onto the skin twice a week. 8 patch 11    ezetimibe (ZETIA) 10 mg tablet Take 1 tablet (10 mg total) by mouth once daily. 90 tablet 3    hydroCHLOROthiazide (HYDRODIURIL) 25 MG tablet Take 1 tablet (25 mg total) by mouth once daily. 90 tablet 3    levothyroxine (SYNTHROID) 50 MCG tablet Take 1 tablet  (50 mcg total) by mouth before breakfast. 90 tablet 3    losartan (COZAAR) 50 MG tablet Take 1 tablet (50 mg total) by mouth once daily. 90 tablet 3    progesterone (PROMETRIUM) 100 MG capsule Take 1 capsule (100 mg total) by mouth nightly. 30 capsule 11    sodium,potassium,mag sulfates (SUPREP BOWEL PREP KIT) 17.5-3.13-1.6 gram SolR Take by mouth.       Current Facility-Administered Medications   Medication Dose Route Frequency Provider Last Rate Last Admin    testosterone cypionate injection 50 mg  50 mg Intramuscular Q28 Days Melania Austin NP   50 mg at 23 0841       Review of patient's allergies indicates:  No Known Allergies    Family History   Problem Relation Age of Onset    Colon cancer Paternal Grandmother     Breast cancer Neg Hx     Ovarian cancer Neg Hx     Uterine cancer Neg Hx     Cervical cancer Neg Hx        Social History     Tobacco Use    Smoking status: Former     Packs/day: 1.00     Years: 49.00     Pack years: 49.00     Types: Cigarettes     Quit date: 2022     Years since quittin.1   Substance Use Topics    Alcohol use: Yes     Alcohol/week: 1.0 standard drink     Types: 1 Cans of beer per week     Comment: occ    Drug use: Not Currently       OB History    Para Term  AB Living   2 2 2         SAB IAB Ectopic Multiple Live Births                  # Outcome Date GA Lbr Eitan/2nd Weight Sex Delivery Anes PTL Lv   2 Term            1 Term                Review of Symptoms:  GENERAL: Denies weight gain or weight loss. Feeling well overall.   SKIN: Denies rash or lesions.   HEAD: Denies head injury or headache.   NODES: Denies enlarged lymph nodes.   CHEST: Denies chest pain or shortness of breath.   CARDIOVASCULAR: Denies palpitations or left sided chest pain.   ABDOMEN: No abdominal pain, constipation, diarrhea, nausea, vomiting or rectal bleeding.   URINARY: No frequency, dysuria, hematuria, or burning on urination.  HEMATOLOGIC: No easy bruisability or excessive  "bleeding.   MUSCULOSKELETAL: Denies joint pain or swelling.     /78 (BP Location: Left arm, Patient Position: Sitting, BP Method: Medium (Automatic))   Pulse 63   Ht 5' 6" (1.676 m)   Wt 68 kg (150 lb)   Physical Exam:  APPEARANCE: Well nourished, well developed, in no acute distress.  SKIN: Normal skin turgor, no lesions.  NECK: Neck symmetric without masses   RESPIRATORY: Normal respiratory effort with no retractions or use of accessory muscles  CARDIOVASCULAR: Peripheral vascular system with no swelling no varicosities and palpation of pulses normal  LYMPHATIC: No enlargements of the lymph nodes noted in the neck, axillae, or groin  ABDOMEN: Soft. No tenderness or masses. No hepatosplenomegaly. No hernias.  EXTREMITIES: No clubbing cyanosis or edema.    ASSESSMENT/PLAN:  Osteopenia of necks of both femurs  -     Comprehensive metabolic panel; Future; Expected date: 07/10/2023  -     Vitamin D 25 hydroxy; Future; Expected date: 07/10/2023  -     Phosphorus; Future; Expected date: 07/10/2023  -     Calcium, ionized; Future; Expected date: 07/10/2023  -     Magnesium; Future; Expected date: 07/10/2023    Esophageal stricture    Other specified disorders of bone density and structure, unspecified site  -     Vitamin D 25 hydroxy; Future; Expected date: 07/10/2023           Risk factors in bold   Race: White  Female   Body mass index is 24.21 kg/m².  Postmenopausal   History for fractures? Parental fractures or osteoporosis?    Exercise?  Steroids? Anticonvulsants? Chemo?   Smoker ?   Alochol/ Caffeine intake?   Diet/supplements?         Recommendations  Limit caffeine and alcohol intake   Nutrition/foods high in calcium and vit D (handout provided)  Calcium (1200mg/d) and Vit D (600-800 IU/d) supplements.   Adequate protein (to reduce potential for fractures and promote fracture healing in older adults)  No smoking/avoid second hand smoke   Weight bearing, resistance exercise and aerobics - spine  Walking - " hip       Options for treatment were discussed in detail with the patient. Due to osteopenia of multiple sites and FRAX score elevation as well as esophageal stricture, will initiate Prolia injections for treatment. Lab work will be performed today prior to initiating treatment. The side effects of this medication including AFF, ONJ, and increased infection risk were discussed thoroughly. The patient desires to initiate treatment. She will notify her dentist of medication initiation and will maintain 6 monthly dental cleaning/evaluation.  Discussed dietary calcium and Vitamin D supplements. Plans to discuss use of calcium supplements with her cardiologist.    FU with NP as needed.        Melania Austin, [place]-[facility]

## 2023-07-11 ENCOUNTER — PATIENT MESSAGE (OUTPATIENT)
Dept: OBSTETRICS AND GYNECOLOGY | Facility: CLINIC | Age: 63
End: 2023-07-11
Payer: MEDICARE

## 2023-07-17 ENCOUNTER — LAB VISIT (OUTPATIENT)
Dept: LAB | Facility: HOSPITAL | Age: 63
End: 2023-07-17
Payer: MEDICARE

## 2023-07-17 ENCOUNTER — OFFICE VISIT (OUTPATIENT)
Dept: GASTROENTEROLOGY | Facility: CLINIC | Age: 63
End: 2023-07-17
Payer: MEDICARE

## 2023-07-17 ENCOUNTER — TELEPHONE (OUTPATIENT)
Dept: ENDOSCOPY | Facility: HOSPITAL | Age: 63
End: 2023-07-17
Payer: MEDICARE

## 2023-07-17 VITALS
WEIGHT: 149.69 LBS | DIASTOLIC BLOOD PRESSURE: 95 MMHG | BODY MASS INDEX: 24.06 KG/M2 | HEIGHT: 66 IN | SYSTOLIC BLOOD PRESSURE: 170 MMHG | HEART RATE: 60 BPM

## 2023-07-17 DIAGNOSIS — M85.852 OSTEOPENIA OF NECKS OF BOTH FEMURS: ICD-10-CM

## 2023-07-17 DIAGNOSIS — R13.19 ESOPHAGEAL DYSPHAGIA: ICD-10-CM

## 2023-07-17 DIAGNOSIS — K21.9 GASTROESOPHAGEAL REFLUX DISEASE, UNSPECIFIED WHETHER ESOPHAGITIS PRESENT: ICD-10-CM

## 2023-07-17 DIAGNOSIS — Z83.719 FAMILY HISTORY OF COLONIC POLYPS: ICD-10-CM

## 2023-07-17 DIAGNOSIS — E55.9 VITAMIN D INSUFFICIENCY: ICD-10-CM

## 2023-07-17 DIAGNOSIS — M85.80 OTHER SPECIFIED DISORDERS OF BONE DENSITY AND STRUCTURE, UNSPECIFIED SITE: ICD-10-CM

## 2023-07-17 DIAGNOSIS — Z86.010 HISTORY OF ADENOMATOUS POLYP OF COLON: ICD-10-CM

## 2023-07-17 DIAGNOSIS — Z51.81 ENCOUNTER FOR MONITORING LONG-TERM PROTON PUMP INHIBITOR THERAPY: ICD-10-CM

## 2023-07-17 DIAGNOSIS — M85.851 OSTEOPENIA OF NECKS OF BOTH FEMURS: ICD-10-CM

## 2023-07-17 DIAGNOSIS — Z80.0 FAMILY HISTORY OF COLON CANCER: Primary | ICD-10-CM

## 2023-07-17 DIAGNOSIS — Z79.899 ENCOUNTER FOR MONITORING LONG-TERM PROTON PUMP INHIBITOR THERAPY: ICD-10-CM

## 2023-07-17 LAB
25(OH)D3+25(OH)D2 SERPL-MCNC: 25 NG/ML (ref 30–96)
ALBUMIN SERPL BCP-MCNC: 4.3 G/DL (ref 3.5–5.2)
ALP SERPL-CCNC: 58 U/L (ref 55–135)
ALT SERPL W/O P-5'-P-CCNC: 12 U/L (ref 10–44)
ANION GAP SERPL CALC-SCNC: 9 MMOL/L (ref 8–16)
AST SERPL-CCNC: 15 U/L (ref 10–40)
BILIRUB SERPL-MCNC: 0.7 MG/DL (ref 0.1–1)
BUN SERPL-MCNC: 15 MG/DL (ref 8–23)
CA-I BLDV-SCNC: 1.2 MMOL/L (ref 1.06–1.42)
CALCIUM SERPL-MCNC: 9.4 MG/DL (ref 8.7–10.5)
CHLORIDE SERPL-SCNC: 104 MMOL/L (ref 95–110)
CO2 SERPL-SCNC: 26 MMOL/L (ref 23–29)
CREAT SERPL-MCNC: 0.8 MG/DL (ref 0.5–1.4)
EST. GFR  (NO RACE VARIABLE): >60 ML/MIN/1.73 M^2
GLUCOSE SERPL-MCNC: 90 MG/DL (ref 70–110)
MAGNESIUM SERPL-MCNC: 1.9 MG/DL (ref 1.6–2.6)
PHOSPHATE SERPL-MCNC: 2.6 MG/DL (ref 2.7–4.5)
POTASSIUM SERPL-SCNC: 4 MMOL/L (ref 3.5–5.1)
PROT SERPL-MCNC: 7.2 G/DL (ref 6–8.4)
SODIUM SERPL-SCNC: 139 MMOL/L (ref 136–145)

## 2023-07-17 PROCEDURE — 83735 ASSAY OF MAGNESIUM: CPT | Performed by: NURSE PRACTITIONER

## 2023-07-17 PROCEDURE — 99999 PR PBB SHADOW E&M-EST. PATIENT-LVL III: ICD-10-PCS | Mod: PBBFAC,,, | Performed by: INTERNAL MEDICINE

## 2023-07-17 PROCEDURE — 99213 OFFICE O/P EST LOW 20 MIN: CPT | Mod: PBBFAC | Performed by: INTERNAL MEDICINE

## 2023-07-17 PROCEDURE — 99204 OFFICE O/P NEW MOD 45 MIN: CPT | Mod: S$PBB,,, | Performed by: INTERNAL MEDICINE

## 2023-07-17 PROCEDURE — 99204 PR OFFICE/OUTPT VISIT, NEW, LEVL IV, 45-59 MIN: ICD-10-PCS | Mod: S$PBB,,, | Performed by: INTERNAL MEDICINE

## 2023-07-17 PROCEDURE — 99999 PR PBB SHADOW E&M-EST. PATIENT-LVL III: CPT | Mod: PBBFAC,,, | Performed by: INTERNAL MEDICINE

## 2023-07-17 PROCEDURE — 80053 COMPREHEN METABOLIC PANEL: CPT | Performed by: NURSE PRACTITIONER

## 2023-07-17 PROCEDURE — 82330 ASSAY OF CALCIUM: CPT | Performed by: NURSE PRACTITIONER

## 2023-07-17 PROCEDURE — 36415 COLL VENOUS BLD VENIPUNCTURE: CPT | Performed by: NURSE PRACTITIONER

## 2023-07-17 PROCEDURE — 84100 ASSAY OF PHOSPHORUS: CPT | Performed by: NURSE PRACTITIONER

## 2023-07-17 PROCEDURE — 82306 VITAMIN D 25 HYDROXY: CPT | Performed by: NURSE PRACTITIONER

## 2023-07-17 RX ORDER — ACETAMINOPHEN 500 MG
4000 TABLET ORAL DAILY
Qty: 180 CAPSULE | Refills: 3 | Status: SHIPPED | OUTPATIENT
Start: 2023-07-17 | End: 2024-07-16

## 2023-07-17 RX ORDER — ACETAMINOPHEN 500 MG
4000 TABLET ORAL DAILY
Qty: 180 CAPSULE | Refills: 3 | Status: SHIPPED | OUTPATIENT
Start: 2023-07-17 | End: 2023-07-17 | Stop reason: SDUPTHER

## 2023-07-17 RX ORDER — PANTOPRAZOLE SODIUM 40 MG/1
40 TABLET, DELAYED RELEASE ORAL DAILY
Qty: 90 TABLET | Refills: 3 | Status: SHIPPED | OUTPATIENT
Start: 2023-07-17 | End: 2023-08-15 | Stop reason: ALTCHOICE

## 2023-07-17 RX ORDER — PANTOPRAZOLE SODIUM 40 MG/1
40 TABLET, DELAYED RELEASE ORAL DAILY
Qty: 90 TABLET | Refills: 3 | Status: SHIPPED | OUTPATIENT
Start: 2023-07-17 | End: 2023-07-17 | Stop reason: SDUPTHER

## 2023-07-17 NOTE — PROGRESS NOTES
"GENERAL GI PATIENT INTAKE:    COVID symptoms in the last 7 days (runny nose, sore throat, congestion, cough, fever): No  PCP: Noah Monroy  If not PCP-  number given to establish [phone]: Yes    ALLERGIES REVIEWED:  Yes    CHIEF COMPLAINT:    Chief Complaint   Patient presents with    Gastroesophageal Reflux       VITAL SIGNS:  BP (!) 170/95   Pulse 60   Ht 5' 6" (1.676 m)   Wt 67.9 kg (149 lb 11.1 oz)   BMI 24.16 kg/m²      Change in medical, surgical, family or social history: Yes      REVIEWED MEDICATION LIST RECONCILED INCLUDING ABOVE MEDS:  Yes      "

## 2023-07-17 NOTE — Clinical Note
Prescription for pantoprazole sent to pharmacy Please have patient see endoscopy schedulers to schedule EGD along with her colonoscopy already has a date for colonoscopy 07/28/2023 and a time slot has been held for her EGD add on  3 months telemedicine video visit

## 2023-07-17 NOTE — PROGRESS NOTES
[facility] Consultation Note    Reason for Consult:  The primary encounter diagnosis was Family history of colon cancer. Diagnoses of Family history of colonic polyps, History of adenomatous polyp of colon, Gastroesophageal reflux disease, unspecified whether esophagitis present, Esophageal dysphagia, Encounter for monitoring long-term proton pump inhibitor therapy, and Vitamin D insufficiency were also pertinent to this visit.    PCP:   Noah Monroy   [address] / [place] [address]    Referring MD:  Noah Monroy Md  [address] [place] [address]    Initial History of Present Illness (HPI):  This is a 63 y.o. female here for evaluation of intermittent solid food dysphagia which has gone on for many years along with reflux currently not taking any PPI has had several episodes since her last EGD with esophageal dilation which she believes was to about 18 mm.  We do recommend she restart PPI pantoprazole 40 mg once daily best taken 45-60 minutes before 1st protein meal of the day breakfast she also is due for surveillance colonoscopy she is had precancerous polyps before her dad with multiple colon polyps who still alive in her dad's dad with colon cancer in his 40s we do recommend her dad who still lives Ca cancer  N/C has not yet to evaluate for possible hereditary colon rectal cancer or polyposis syndrome and let us know what those results are patient is 1 of 4 children from her dad.  She does have regular bowel movements maybe once a week takes MiraLax for constipation so we do recommend she do a restricted diet and clear liquid diet only the day before her colonoscopy with a split bowel prep and we recommend she started MiraLax 1 capful which is 17 g in 12 oz of water once daily starting about 5 days prior to colonoscopy to ensure a good bowel prep.  No odynophagia no weight loss no abdominal pain no blood in her stool her mom's dad with brain cancer in his  60 dad's dad with colon cancer in his 40s dad with multiple colon polyps there originally from [place] have retired moved down here to [place] to be closer to good healthcare.  Otherwise doing well.  Not taking a lot NSAIDs.  No family history of esophageal or stomach cancer no family history of liver or gallbladder cancer no family history of pancreatitis or pancreatic cancer nobody with ovarian uterine kidney bladder or ureter cancer in the family nobody with celiac sprue or inflammatory bowel disease.    Abdominal pain - no  Reflux - as above  Dysphagia -as above   Bowel habits - normal  GI bleeding - none  NSAID usage - none    Interval HPI 07/17/2023:  The patient's last visit with me was on Visit date not found.      ROS:  Constitutional: No fevers, chills, No weight loss  ENT:  As above heartburn no dysphagia no odynophagia no hoarseness  CV: No chest pain, no palpitation  Pulm: No cough, No shortness of breath, no wheezing  Ophtho: No vision changes  GI: see HPI  Derm: No rash, no itching  Heme: No lymphadenopathy, No easy bruising  MSK: No significant arthritis  : No dysuria, No hematuria  Endo: No hot or cold intolerance  Neuro: No syncope, No seizure, no strokes  Psych: No uncontrolled anxiety, No uncontrolled depression    Medical History:  has a past medical history of GERD (gastroesophageal reflux disease), Hypertension, and Hypothyroidism, unspecified.    Surgical History:  has a past surgical history that includes Hysterectomy; Augmentation of breast (Bilateral); and Oophorectomy.    Family History: family history includes Colon cancer in her paternal grandmother..     Social History:  reports that she quit smoking about 14 months ago. Her smoking use included cigarettes. She has a 49.00 pack-year smoking history. She does not have any smokeless tobacco history on file. She reports current alcohol use of about 1.0 standard drink per week. She reports that she does not currently use  "drugs.    Review of patient's allergies indicates:  No Known Allergies    Medication List with Changes/Refills   New Medications    CHOLECALCIFEROL, VITAMIN D3, (VITAMIN D3) 50 MCG (2,000 UNIT) CAP CAPSULE    Take 2 capsules (4,000 Units total) by mouth once daily.    PANTOPRAZOLE (PROTONIX) 40 MG TABLET    Take 1 tablet (40 mg total) by mouth once daily. Taken 45-60 minutes before your 1st protein meal of the day breakfast   Current Medications    ASPIRIN (ECOTRIN) 81 MG EC TABLET    Take 81 mg by mouth once daily.    DILTIAZEM (CARDIZEM CD) 240 MG 24 HR CAPSULE    Take 1 capsule (240 mg total) by mouth once daily.    ERGOCALCIFEROL (VITAMIN D2) 50,000 UNIT CAP    Take 1 capsule (50,000 Units total) by mouth every 7 days.    ESTRADIOL 0.05 MG/24 HR TD PTSW (VIVELLE-DOT) 0.05 MG/24 HR    Place 1 patch onto the skin twice a week.    EZETIMIBE (ZETIA) 10 MG TABLET    Take 1 tablet (10 mg total) by mouth once daily.    HYDROCHLOROTHIAZIDE (HYDRODIURIL) 25 MG TABLET    Take 1 tablet (25 mg total) by mouth once daily.    LEVOTHYROXINE (SYNTHROID) 50 MCG TABLET    Take 1 tablet (50 mcg total) by mouth before breakfast.    LOSARTAN (COZAAR) 50 MG TABLET    Take 1 tablet (50 mg total) by mouth once daily.    PROGESTERONE (PROMETRIUM) 100 MG CAPSULE    Take 1 capsule (100 mg total) by mouth nightly.    SODIUM,POTASSIUM,MAG SULFATES (SUPREP BOWEL PREP KIT) 17.5-3.13-1.6 GRAM SOLR    Take by mouth.   Discontinued Medications    BUPROPION (WELLBUTRIN XL) 150 MG TB24 TABLET    Take 1 tablet (150 mg total) by mouth 2 (two) times a day.         Objective Findings:    Vital Signs:  BP (!) 170/95   Pulse 60   Ht 5' 6" (1.676 m)   Wt 67.9 kg (149 lb 11.1 oz)   BMI 24.16 kg/m²   Body mass index is 24.16 kg/m².    Physical Exam:  General Appearance: Well appearing in no acute distress  Eyes:    No scleral icterus  ENT:  No lesions or masses   Lungs: CTA bilaterally, no wheezes, no rhonchi, no rales  Heart:  S1, S2 normal, no " murmurs heard  Abdomen:  Non distended, soft, no guarding, no rebound, no tenderness, no appreciated ascites, no bruits, no hepatosplenomegaly,  No CVA tenderness, no appreciated hernias, no Barron sign, no McBurney point tenderness  Musculoskeletal:  No major joint deformities  Skin: No petechiae or rash on exposed skin areas  Neurologic:  Alert and oriented x4  Psychiatric:  Normal speech mentation and affect    Labs:  Lab Results   Component Value Date    WBC 6.34 02/07/2023    HGB 15.2 02/07/2023    HCT 47.0 02/07/2023     02/07/2023    CHOL 164 02/07/2023    TRIG 77 02/07/2023    HDL 63 02/07/2023    ALT 12 07/17/2023    AST 15 07/17/2023     07/17/2023    K 4.0 07/17/2023     07/17/2023    CREATININE 0.8 07/17/2023    BUN 15 07/17/2023    CO2 26 07/17/2023    TSH 1.493 06/12/2023    HGBA1C 5.3 02/07/2023           I    Medical Decision Making:  Lab work reviewed  EGD talk given GERD talk given lower esophageal ring talk given PPI talk given  Surveillance colonoscopy talk given split bowel prep talk given restricted diet talk given  For family polyposis or colon rectal cancer syndrome have recommended that patient's father still alive  See a cancer  for evaluation colon cancer syndrome or polyposis syndrome discuss  Ppi talk given        Assessment:  1. Family history of colon cancer    2. Family history of colonic polyps    3. History of adenomatous polyp of colon    4. Gastroesophageal reflux disease, unspecified whether esophagitis present    5. Esophageal dysphagia    6. Encounter for monitoring long-term proton pump inhibitor therapy    7. Vitamin D insufficiency         Recommendations:  1. Referral to endoscopy schedulers for EGD for longstanding GERD and intermittent solid food dysphagia  2. Referral to endoscopy schedulers colonoscopy history of adenomas colon polyps family history of dad with multiple colon polyps and dad's dad with colon cancer in his 40s.  Recommend  patient start MiraLax 17 g in 12 oz of water once daily starting 5 days prior to colonoscopy  3. Recommend patient's father who is still alive who has multiple colon polyps on recent colonoscopy see Cancer Genetics to evaluate for possible family polyposis syndrome or hereditary colon cancer syndrome.  If gene identified very important that we know about it for his first-degree relatives to be tested  4. Start pantoprazole 40 mg once daily best taken 45-60 minutes before your 1st protein meal of the day breakfast  5. History of vitamin-D insufficiency recommend patient start vitamin D3 by mouth 4,000 IU once daily this is an over-the-counter medicine    6. Return GI clinic 3 months for follow-up    No follow-ups on file.      Order summary:  Orders Placed This Encounter    cholecalciferol, vitamin D3, (VITAMIN D3) 50 mcg (2,000 unit) Cap capsule    pantoprazole (PROTONIX) 40 MG tablet         Thank you so much for allowing me to participate in the care of Nora Burger    Charles Shah MD    DISCLAIMER: This note was prepared with Chatterous voice recognition transcription software. Garbled syntax, mangled or inadvertent pronouns, and other bizarre constructions may be attributed to that software system. While efforts were made to correct any mistakes made by this voice recognition program, some errors and/or omissions may remain in the note that were missed when the note was originally created.

## 2023-07-17 NOTE — PATIENT INSTRUCTIONS
"Procedure: EGD and colonoscopy colonoscopies already scheduled 2023 a time slot has been held for her for EGD at on    Diagnosis: Surveillance colonoscopy - Hx of colon polyps, family history of multiple colon polyps father grandfather with colon cancer, GERD, dysphagia history of esophageal dilation 18 mm    Procedure Timin-4 weeks    *If within 4 weeks selected, please amy as high priority*    *If greater than 12 weeks, please select "5-12 weeks" and delay sending until 2 months prior to requested date*    Provider: Myself    Location: [address]    Additional Scheduling Information:  Patient is already scheduled for colonoscopy on 2023 slot has been held to add on this EGD    Prep Specifications:Standard prep recommend patient start her MiraLax 17 g in 12 oz of water once daily starting 5 days prior to colonoscopy    Have you attached a patient to this message: yes     Recommend your father who still live who is had multiple colon polyp see a  to see if there is a hereditary polyposis or hereditary colon cancer syndrome in the family please let us know the results  "

## 2023-07-17 NOTE — Clinical Note
"Procedure: EGD and colonoscopy colonoscopies already scheduled 2023 a time slot has been held for her for EGD at on  Diagnosis: Surveillance colonoscopy - Hx of colon polyps, family history of multiple colon polyps father grandfather with colon cancer, GERD, dysphagia history of esophageal dilation 18 mm  Procedure Timin-4 weeks  *If within 4 weeks selected, please amy as high priority*  *If greater than 12 weeks, please select "5-12 weeks" and delay sending until 2 months prior to requested date*  Provider: Myself  Location: [address]  Additional Scheduling Information:  Patient is already scheduled for colonoscopy on 2023 slot has been held to add on this EGD  Prep Specifications:Standard prep recommend patient start her MiraLax 17 g in 12 oz of water once daily starting 5 days prior to colonoscopy  Have you attached a patient to this message: yes "

## 2023-07-17 NOTE — TELEPHONE ENCOUNTER
Spoke to Tri to schedule procedure(s) Colonoscopy/EGD       Physician to perform procedure(s) Dr. LAITH Shah  Date of Procedure (s) 7/28/23  Arrival Time 8:30 AM  Time of Procedure(s) 9:30 AM   Location of Procedure(s) [address]  Type of Rx Prep sent to patient: Suprep  Instructions provided to patient via [facility]    Patient was informed on the following information and verbalized understanding. Screening questionnaire reviewed with patient and complete. If procedure requires anesthesia, a responsible adult needs to be present to accompany the patient home, patient cannot drive after receiving anesthesia. Appointment details are tentative, especially check-in time. Patient will receive a prep-op call 4 days prior to confirm check-in time for procedure. If applicable the patient should contact their pharmacy to verify Rx for procedure prep is ready for pick-up. Patient was advised to call the scheduling department at [phone] if pharmacy states no Rx is available. Patient was advised to call the endoscopy scheduling department if any questions or concerns arise.      [facility] Scheduling Department

## 2023-07-17 NOTE — TELEPHONE ENCOUNTER
"Charles Shah MD  P [facility]  Procedure: EGD and colonoscopy colonoscopies already scheduled 2023 a time slot has been held for her for EGD at on     Diagnosis: Surveillance colonoscopy - Hx of colon polyps, family history of multiple colon polyps father grandfather with colon cancer, GERD, dysphagia history of esophageal dilation 18 mm     Procedure Timin-4 weeks     *If within 4 weeks selected, please amy as high priority*     *If greater than 12 weeks, please select "5-12 weeks" and delay sending until 2 months prior to requested date*     Provider: Myself     Location: [address]     Additional Scheduling Information:  Patient is already scheduled for colonoscopy on 2023 slot has been held to add on this EGD     Prep Specifications:Standard prep recommend patient start her MiraLax 17 g in 12 oz of water once daily starting 5 days prior to colonoscopy     Have you attached a patient to this message: yes   "

## 2023-07-20 ENCOUNTER — PATIENT MESSAGE (OUTPATIENT)
Dept: REHABILITATION | Facility: OTHER | Age: 63
End: 2023-07-20
Payer: MEDICARE

## 2023-07-25 ENCOUNTER — CLINICAL SUPPORT (OUTPATIENT)
Dept: REHABILITATION | Facility: OTHER | Age: 63
End: 2023-07-25
Payer: MEDICARE

## 2023-07-25 DIAGNOSIS — R27.8 COORDINATION IMPAIRMENT: ICD-10-CM

## 2023-07-25 DIAGNOSIS — N39.3 STRESS INCONTINENCE IN FEMALE: ICD-10-CM

## 2023-07-25 DIAGNOSIS — M25.552 CHRONIC LEFT HIP PAIN: ICD-10-CM

## 2023-07-25 DIAGNOSIS — G89.29 CHRONIC LEFT HIP PAIN: ICD-10-CM

## 2023-07-25 DIAGNOSIS — M62.89 PELVIC FLOOR DYSFUNCTION: ICD-10-CM

## 2023-07-25 PROCEDURE — 97161 PT EVAL LOW COMPLEX 20 MIN: CPT | Mod: PN | Performed by: PHYSICAL THERAPIST

## 2023-07-25 PROCEDURE — 97530 THERAPEUTIC ACTIVITIES: CPT | Mod: PN | Performed by: PHYSICAL THERAPIST

## 2023-07-25 NOTE — PLAN OF CARE
[facility]   Pelvic Health Physical Therapy Initial Evaluation      Date: 7/25/2023   Name: Nora Burger  Clinic Number: [id number]    Therapy Diagnosis:   Encounter Diagnoses   Name Primary?    Pelvic floor dysfunction Yes    Coordination impairment     Chronic left hip pain      Referring Provider: Melania Austin, SHARRON    Referring Provider Orders: PT Eval and Treat  Medical Diagnosis from Referral: Stress incontinence in female [N39.3]  Evaluation Date: 7/25/2023  Authorization Period Expiration: 6/18/2024  Plan of Care Expiration: 10/25/2023  Visit # / Visits authorized: 1/pending  FOTO: 1/3 (7/25/2023)    Precautions: standard, osteopenia    Time In: 14:05  Time Out: 15:00  Total Appointment Time (timed & untimed codes): 55 minutes    SUBJECTIVE     Date of onset: in the last several years   History of current condition: Tri reports bothersome urine leakage that has been worsening over the past few years. She leaks urine daily, with coughing/sneezing and with rushing to the toilet. She also notes that running water will sometimes trigger leakage.    OB/GYN HISTORY - 2 C-sections, tubal ligation, hysterectomy    BLADDER HISTORY  Frequency of urination:   Day: every 45-90 minutes           Night: 1-3x  Difficulty initiating urine stream: No  Urine stream: strong  Complete emptying: Yes  Post-void dribbling: Yes  Urinary Urgency: Yes  Bladder leakage: Yes - with coughing, sneezing, with urgency  Frequency of incidents: daily    BOWEL HISTORY  Frequency of bowel movements: once a day  Difficulty initiating BM: Yes  Quality/shape of BM: [place] Stool Chart 1-3  Incomplete emptying? Yes - sometimes has to manually extract  Fiber supplements, probiotics or laxative Use? Yes - Metamucil (helpful)  Colon leakage: No    SEXUAL/PELVIC PAIN HISTORY  Sexually active? Yes   Pain with vaginal exams, intercourse or tampon use? No  Pain with wearing certain clothes, sitting on certain surfaces?  No  Tailbone injury? Yes - coccyx fracture in highschool  Feeling of pelvic heaviness? No    Pain:  Location: L lateral hip  Description: aching/sore  Aggravating Factors/Activities that cause symptoms: walking, side-lying position  Easing Factors: activity modification    Imaging: none pertinent to the pelvic floor    Prior Therapy: no pelvic floor therapy  Social History: lives in a house with steps to enter, with family  Current exercise: walking  Occupation: retired  Prior Level of Function: no pelvic floor dysfunction  Current Level of Function: daily urine leakage and chronic constipation/difficulty with bowel movements    Types of fluid intake: 2 cups of coffee, 2 17-oz bottles of water  Diet: focusing in fiber  Habitus: well developed, well nourished  Abuse/Neglect: none reported    Patients goals: reduce urine leakage and urgency     Medical History: Tri  has a past medical history of GERD (gastroesophageal reflux disease), Hypertension, and Hypothyroidism, unspecified.     Surgical History: Nora Burger  has a past surgical history that includes Hysterectomy; Augmentation of breast (Bilateral); and Oophorectomy.    Medications: Nora has a current medication list which includes the following prescription(s): aspirin, cholecalciferol (vitamin d3), diltiazem, ergocalciferol, estradiol 0.05 mg/24 hr td ptsw, ezetimibe, hydrochlorothiazide, levothyroxine, losartan, pantoprazole, progesterone, and sodium,potassium,mag sulfates, and the following Facility-Administered Medications: testosterone cypionate.    Allergies: Review of patient's allergies indicates:  No Known Allergies     OBJECTIVE     Pt declined assessment today.    Limitation/Restriction for FOTO Pelvic Floor Surveys    Therapist reviewed FOTO scores for Nora Burger on 7/25/2023  FOTO documents entered into mAPPn - see Media section.    Limitation Score:   PFDI Urinary - 17% impairment  Bowel Constipation - 45% impairment  Urinary  Problem - 48% impairment     TREATMENT     Total Treatment time (time-based codes) separate from the evaluation: 25 minutes     Therapeutic activities to improve functional performance for 23 minutes -  [x] Instruction on urge suppression techniques  [x] Education on reducing just-in-case voids, normal void intervals  [x] Education on basic constipation management - water, fiber, activity  [x] Instruction on diaphragmatic breathing and hip-opening stretches for pelvic floor relaxation  [x] Education on visual cues/mental imagery for pelvic floor relaxation  [x] Instruction on the Knack for reduction of stress urinary incontinence  [x] Education on bladder irritants  [x] HEP building/HEP review    Therapeutic exercises to develop strength, endurance and core stabilization for 2 minutes -  [x] Supine clam with yellow band, x30    PATIENT EDUCATION AND HOME EXERCISES     Education provided: general anatomy/physiology of urinary & bowel system, benefits of treatment, and alternative methods of treatment were discussed with the patient. Additionally, Nora was provided education on pt prognosis, PT plan of care, pelvic floor anatomy & function, relationship between TrA & PFM, relationship between hips & PFM, relationship between pelvic floor dysfunction & lower back pain, bladder irritants, urge suppression, etiology of urinary urgency, etiology of stress urinary incontinence, the knack for management of stress urinary incontinence, etiology of constipation, conservative management of constipation (water, fiber, exercise), and bowel movement consistency goals    Written Home Exercises provided: yes  Exercises were reviewed, and Tri was able to demonstrate them prior to the end of the session. Tri demonstrated good understanding of the education provided. See EMR under 'Patient Instructions' for exercises and education provided during session.    ASSESSMENT     Tri is a 63 y.o. female referred to outpatient  physical therapy with a medical diagnosis of stress urinary incontinence. No direct pelvic floor examination performed today, but pt presentation and subjective report suggest pelvic floor dysfunction, particularly coordination deficits that lead to inadequate urethral closure with stress and urgency. Pt may also demonstrate weakness or tension that contributes to symptoms, and assessment in coming visits will determine (if indicated). Constipation and incomplete emptying, as well as dehydration likely also contributing to urinary dysfunction. Symptoms impair the patient's ability to perform ADLs and functional mobility, as well as remain continent.    Patient prognosis is good  Tri will benefit from skilled outpatient physical therapy to address the deficits stated above and in the chart below, provide patient/family education, and to maximize the patient's level of independence.     Plan of care discussed with patient: yes  Patient's spiritual, cultural and educational needs considered, and the patient is agreeable to the plan of care and goals as stated below:     Anticipated Barriers for therapy: none    Medical Necessity is demonstrated by the following -  History  Co-morbidities and personal factors that may impact the plan of care Co-morbidities   Hysterectomy; Augmentation of breast (Bilateral); and Oophorectomy  GERD (gastroesophageal reflux disease), Hypertension, and Hypothyroidism    Personal Factors  no deficits     low   Examination  Body structures and functions, activity limitations and participation restrictions that may impact the plan of care Body Regions/Systems/Functions:  increased frequency of urination, increased nocturia, poor coordination of pelvic floor muscles during ADL's leading to urinary or fecal leakage, poor fluid intake, and dysfunctional defecation     Activity Limitations:  urgency , bearing down for BM, initiating a BM, sleep uninterrupted by excessive nocturia, incontinence  with ADLs, Pain with ADLs, and bathroom mapping    Participation Restrictions:  all ADLs/iADLs uninterrupted by urinary incontinence/urgency/frequency, all ADLs/iADLs uninterrupted by discomfort associated with chronic constipation, social activities with friends/family, regularly having a comfortable BM, and Sleep restrictions    Activity limitations:   Learning and applying knowledge  no deficits    General Tasks and Commands  no deficits    Communication  no deficits    Mobility  no deficits    Self care  toileting    Domestic Life  no deficits    Interactions/Relationships  no deficits    Life Areas  no deficits    Community and Social Life  Community life, recreation & leisure       moderate   Clinical Presentation stable and uncomplicated low   Decision Making/ Complexity Score: low         Short Term Goals: 6 weeks   Pt to demonstrate the knack independently to reduce incidence of stress urinary incontinence  Pt to report 50% improvement in urine leakage  Pt to report urinary frequency of no more than every 1.5 hours to demonstrate improved bladder control  Pt to be able to delay urination for at least 5 minutes with strong urge to demonstrate improved bladder control  Pt to verbalize urge suppression strategies for improved control over urgency and urge urinary incontinence  Pt to report >50% improvement in constipation and ease of bowel movements to reduce impact on urinary urgency     Long Term Goals: 12 weeks   Pt to deny urinary incontinence to demonstrate improved pelvic floor coordination  Pt to report nocturia of no more than 1-2 times per night for improved sleep quality  Pt to report urinary frequency of no more than every 2 hours to demonstrate improved bladder control  Pt to score no more than 30% impairment on the Urinary Problem FOTO survey to demonstrate reduced impairment due to pelvic floor dysfunction     PLAN     Plan of Care certification: 7/25/2023 to 10/25/2023    Outpatient Physical  Therapy - 1 time per week for 12 weeks to include the following interventions: Therapeutic Exercise, Manual Therapy, Therapeutic Activity, Neuromuscular Re-education, Electrical Stimulation Unattended, Self-Care, Patient Education      Belle Powers, PT, DPT

## 2023-07-25 NOTE — PATIENT INSTRUCTIONS
*Work on drinking a 3rd bottle of water daily to help constipation and urinary urgency  *Try to wait 60-75 minutes between urinating to help reduce bladder sensitivity    Reducing incontinence with the Knack  The Knack is a strong and well-timed contraction of the pelvic floor muscles performed immediately before and during an increase in downward pressure on the pelvic floor.     Bladder leaks can be controlled using this exercise, which helps to close the urine tube more effectively. When the pelvic floor muscles are working as they should, they contract automatically before and during an increase in pressure from within the abdomen, such as during a cough or sneeze. When the muscles are not working appropriately, this action is not automatic, but it can improve with practice.    The Knack can be used with events or activities that increase downward pressure upon your pelvic floor such as:    Coughing  Sneezing  Lifting  Blowing your nose  Rising into standing from sitting  Stepping down heavily    When you feel like you're about to cough/sneeze/lift...  Lift and squeeze the muscles in and around all three pelvic openings (urethra, vagina, and anus) immediately before  Maintain this pelvic floor muscle contraction as cough/sneeze/lift  Afterwards, relax your pelvic floor muscles back to normal resting level    If the pelvic floor is not very strong or has been working hard all day (lots of lifting, excessive coughing/sneezing while sick), then you may still experience some leakage. Just do your best, and it should improve as the pelvic floor gets stronger.    Simply remember to lift and squeeze with every cough, lift or sneeze!      Urge Suppression (for when you feel a dramatic urge to urinate and want to control OR when you want to wait longer to head to toilet)  1) Stop what you're doing and put both feet on the ground  2) Perform 5-10 quick flicks of the pelvic floor, focusing on full relaxation between  reps   - Imagine closing and opening the doors to the vagina  3) Take a deep breath and relax your pelvic floor   - Focusing on relaxing the jaw or belly might work better  4) Assess if the urge was real   - How long has it been since your last void?   - Does the urgency go away or not?  5) Proceed to the bathroom, if needed    *If applicable, gently squeeze the pelvic floor while coming to stand to help manage increased urgency with the transfer  *You may need to repeat the steps if urgency arises again on the way to the bathroom  *Try to suppress urgency when it's of a medium intensity as it's harder to suppress when urgency is severe  *Avoid performing a long squeeze of the pelvic floor to hold back urine, as it will likely not be effective  *Identify your triggers (putting key into the front door, seeing the toilet, arranging clothing for voiding)    Supine clam with band, 30 reps  - Holding the hips level, push against the band to widen the knees  *Don't hold your breath            *Here's something we didn't talk about, but could be beneficial for improving constipation  Bowel Movement Body Mechanics  1. Sit on the toilet comfortably with legs and buttocks relaxed.  2. Put your feet on a step stool or squatty potty (~8 inches tall).  3. Lean forward while keeping your back straight and rest your elbows on your knees.  4. Keep your knees apart and place your hands on your belly  5. Inhale and make your belly big  6. Make the belly hard as you exhale like you are blowing out birthday candles while you gently bear down.   *Press the hands over the lower belly to prevent it from pushing out with bearing down  *Do not strain, do not hold your breath.

## 2023-07-27 ENCOUNTER — CLINICAL SUPPORT (OUTPATIENT)
Dept: OBSTETRICS AND GYNECOLOGY | Facility: CLINIC | Age: 63
End: 2023-07-27
Payer: MEDICARE

## 2023-07-27 DIAGNOSIS — R68.82 LOW LIBIDO: Primary | ICD-10-CM

## 2023-07-27 PROCEDURE — 96372 THER/PROPH/DIAG INJ SC/IM: CPT | Mod: PBBFAC

## 2023-07-27 PROCEDURE — 99999PBSHW PR PBB SHADOW TECHNICAL ONLY FILED TO HB: Mod: PBBFAC,,, | Performed by: NURSE PRACTITIONER

## 2023-07-27 PROCEDURE — 99999PBSHW PR PBB SHADOW TECHNICAL ONLY FILED TO HB: ICD-10-PCS | Mod: PBBFAC,,, | Performed by: NURSE PRACTITIONER

## 2023-07-27 RX ADMIN — TESTOSTERONE CYPIONATE 50 MG: 200 INJECTION, SOLUTION INTRAMUSCULAR at 10:07

## 2023-07-27 NOTE — PROGRESS NOTES
Pelvic Health Physical Therapy   Treatment Note     Name: Nora Burger  Clinic Number: [id number]    Therapy Diagnosis:   Encounter Diagnoses   Name Primary?    Pelvic floor dysfunction Yes    Coordination impairment     Chronic left hip pain      Referring Provider: Melania Austin NP    Visit Date: 7/31/2023    Referring Provider Orders: PT Eval and Treat  Medical Diagnosis from Referral: Stress incontinence in female [N39.3]  Evaluation Date: 7/25/2023  Authorization Period Expiration: 6/18/2024  Plan of Care Expiration: 10/25/2023  Visit # / Visits authorized: 2/20  FOTO: 2 (7/25/2023, 7/31/2023)    Cancelled Visits: -  No Show Visits: -    Time In: 13:00  Time Out: 14:00  Total Billable Time: 56 minutes    Precautions: standard    Subjective     Tri reports no having to get up at all for urinate last night. No urine leakage with rushing to toilet, and she has been waiting at least 60 minutes between voids. She denies stress urinary incontinence. She is eager to start an exercise program at home to improve her strength, L hip pain, balance, and bone density.    She was compliant with home exercise program.  Response to previous treatment: no adverse effect  Functional change: improved urinary frequency/urgency/incontinence    Pain: mild/none - L lateral hip    Objective     Tri received the following interventions during the treatment session:   (TrA = transverse abdominis, PFM = pelvic floor muscle, sEMG = surface electromyography)  Pt consented to pelvic floor muscle assessment and treatment in prior visit. See EMR.    Therapeutic activities to improve functional performance for 23 minutes -  [x] Review of reducing just-in-case voids, normal void intervals, appropriate water intake  [x] Review of urge suppression techniques  [x] Review of Knack for reduction of stress urinary incontinence  [x] Review of appropriate body mechanics for bowel movements, optimal stool consistency  [x] HEP building/HEP  review    Therapeutic exercises to develop strength, endurance and core stabilization for 32 minutes -  [x] Bridging with belt around knees, 2x10  [x] Straight leg raise (R&L), x10  [x] Side-lying hip abduction (R&L), 2x5  [x] Standing pallof press with doubled green tubing (R&L), x10  [x] Sit to stand + bilateral overhead press with 5# dumbbells, 2x10  [x] Unilateral overhead press (5#, R&L), + alternating marches, x10  [x] Side-stepping with yellow band around knees, 2 minutes      Home Exercises and Patient Education     Patient Education: progression of plan of care, plan for next session, PT plan of care, pelvic floor anatomy & function, relationship between TrA & PFM, relationship between hips & PFM, urge suppression, etiology of urinary urgency, etiology of stress urinary incontinence, the knack for management of stress urinary incontinence, timeline for muscle changes with consistent strength training, conservative management of constipation (water, fiber, exercise), proper body mechanics for bowel movement, bowel movement consistency goals, and abdominal wall anatomy    Home Exercise Program: the knack, urge suppression, supine clam, drink more water, wait at least 60 minutes between voids  Home Exercise Program Updates (7/31/23): see 'Patient Instructions'    Exercises were reviewed, and Tri was able to demonstrate them prior to the end of the session, as needed. Tri demonstrated good understanding of the education provided.     Assessment     Pt tolerated treatment session well, with good understanding of education provided and no increased symptoms with exercise. Pt demonstrates functional strength deficits in the L glute, and progressive resistance exercise could help improve pain. Pt's pelvic floor symptoms have responded well to bladder habit retraining and education; PT will continue to monitor and intervene as necessary. Pt's functional mobility and ability to perform ADLs still limited by  pelvic floor dysfunction and chronic L hip pain.     Tri is progressing well towards her goals.   Pt prognosis: good    Pt will continue to benefit from skilled outpatient physical therapy to address the deficits listed in the problem list box on initial evaluation, provide pt/family education and to maximize pt's level of independence in the home and community environment.     Pt's spiritual, cultural and educational needs considered and pt agreeable to plan of care and goals.  Anticipated barriers to physical therapy: none      Short Term Goals: 6 weeks   Pt to demonstrate the knack independently to reduce incidence of stress urinary incontinence - MET  Pt to report 50% improvement in urine leakage - MET  Pt to report urinary frequency of no more than every 1.5 hours to demonstrate improved bladder control - PARTIALLY MET  Pt to be able to delay urination for at least 5 minutes with strong urge to demonstrate improved bladder control - MET  Pt to verbalize urge suppression strategies for improved control over urgency and urge urinary incontinence - MET  Pt to report >50% improvement in constipation and ease of bowel movements to reduce impact on urinary urgency - MET     Long Term Goals: 12 weeks   Pt to deny urinary incontinence to demonstrate improved pelvic floor coordination - NOT MET  Pt to report nocturia of no more than 1-2 times per night for improved sleep quality - NOT MET  Pt to report urinary frequency of no more than every 2 hours to demonstrate improved bladder control - NOT MET  Pt to score no more than 30% impairment on the Urinary Problem FOTO survey to demonstrate reduced impairment due to pelvic floor dysfunction - NOT MET    Plan     Continue per Plan of Care      Belle Powers, PT, DPT

## 2023-07-28 ENCOUNTER — ANESTHESIA EVENT (OUTPATIENT)
Dept: ENDOSCOPY | Facility: HOSPITAL | Age: 63
End: 2023-07-28
Payer: MEDICARE

## 2023-07-28 ENCOUNTER — HOSPITAL ENCOUNTER (OUTPATIENT)
Facility: HOSPITAL | Age: 63
Discharge: HOME | End: 2023-07-28
Attending: INTERNAL MEDICINE | Admitting: INTERNAL MEDICINE
Payer: MEDICARE

## 2023-07-28 ENCOUNTER — ANESTHESIA (OUTPATIENT)
Dept: ENDOSCOPY | Facility: HOSPITAL | Age: 63
End: 2023-07-28
Payer: MEDICARE

## 2023-07-28 VITALS
SYSTOLIC BLOOD PRESSURE: 143 MMHG | DIASTOLIC BLOOD PRESSURE: 77 MMHG | OXYGEN SATURATION: 99 % | BODY MASS INDEX: 23.63 KG/M2 | WEIGHT: 147 LBS | RESPIRATION RATE: 28 BRPM | HEART RATE: 70 BPM | TEMPERATURE: 98 F | HEIGHT: 66 IN

## 2023-07-28 DIAGNOSIS — Z12.11 SCREEN FOR COLON CANCER: ICD-10-CM

## 2023-07-28 PROCEDURE — 25000003 PHARM REV CODE 250: Performed by: NURSE ANESTHETIST, CERTIFIED REGISTERED

## 2023-07-28 PROCEDURE — 88305 TISSUE EXAM BY PATHOLOGIST: ICD-10-PCS | Mod: 26,,, | Performed by: STUDENT IN AN ORGANIZED HEALTH CARE EDUCATION/TRAINING PROGRAM

## 2023-07-28 PROCEDURE — 45385 COLONOSCOPY W/LESION REMOVAL: CPT | Mod: PT | Performed by: INTERNAL MEDICINE

## 2023-07-28 PROCEDURE — 88305 TISSUE EXAM BY PATHOLOGIST: CPT | Mod: 26,,, | Performed by: STUDENT IN AN ORGANIZED HEALTH CARE EDUCATION/TRAINING PROGRAM

## 2023-07-28 PROCEDURE — 88342 IMHCHEM/IMCYTCHM 1ST ANTB: CPT | Performed by: STUDENT IN AN ORGANIZED HEALTH CARE EDUCATION/TRAINING PROGRAM

## 2023-07-28 PROCEDURE — 43249 ESOPH EGD DILATION <30 MM: CPT | Mod: 51,,, | Performed by: INTERNAL MEDICINE

## 2023-07-28 PROCEDURE — 43249 PR EGD, FLEX, W/BALL DILATION, < 30MM: ICD-10-PCS | Mod: 51,,, | Performed by: INTERNAL MEDICINE

## 2023-07-28 PROCEDURE — 43239 EGD BIOPSY SINGLE/MULTIPLE: CPT | Mod: 59,,, | Performed by: INTERNAL MEDICINE

## 2023-07-28 PROCEDURE — 43239 EGD BIOPSY SINGLE/MULTIPLE: CPT | Mod: 59 | Performed by: INTERNAL MEDICINE

## 2023-07-28 PROCEDURE — E9220 PRA ENDO ANESTHESIA: HCPCS | Mod: ,,, | Performed by: NURSE ANESTHETIST, CERTIFIED REGISTERED

## 2023-07-28 PROCEDURE — 88305 TISSUE EXAM BY PATHOLOGIST: CPT | Performed by: STUDENT IN AN ORGANIZED HEALTH CARE EDUCATION/TRAINING PROGRAM

## 2023-07-28 PROCEDURE — 37000009 HC ANESTHESIA EA ADD 15 MINS: Performed by: INTERNAL MEDICINE

## 2023-07-28 PROCEDURE — 88342 IMHCHEM/IMCYTCHM 1ST ANTB: CPT | Mod: 26,,, | Performed by: STUDENT IN AN ORGANIZED HEALTH CARE EDUCATION/TRAINING PROGRAM

## 2023-07-28 PROCEDURE — 27201012 HC FORCEPS, HOT/COLD, DISP: Performed by: INTERNAL MEDICINE

## 2023-07-28 PROCEDURE — 25000003 PHARM REV CODE 250: Performed by: INTERNAL MEDICINE

## 2023-07-28 PROCEDURE — 37000008 HC ANESTHESIA 1ST 15 MINUTES: Performed by: INTERNAL MEDICINE

## 2023-07-28 PROCEDURE — 63600175 PHARM REV CODE 636 W HCPCS: Performed by: NURSE ANESTHETIST, CERTIFIED REGISTERED

## 2023-07-28 PROCEDURE — 43249 ESOPH EGD DILATION <30 MM: CPT | Performed by: INTERNAL MEDICINE

## 2023-07-28 PROCEDURE — 45385 COLONOSCOPY W/LESION REMOVAL: CPT | Mod: PT,,, | Performed by: INTERNAL MEDICINE

## 2023-07-28 PROCEDURE — C1726 CATH, BAL DIL, NON-VASCULAR: HCPCS | Performed by: INTERNAL MEDICINE

## 2023-07-28 PROCEDURE — 88342 CHG IMMUNOCYTOCHEMISTRY: ICD-10-PCS | Mod: 26,,, | Performed by: STUDENT IN AN ORGANIZED HEALTH CARE EDUCATION/TRAINING PROGRAM

## 2023-07-28 PROCEDURE — 45385 PR COLONOSCOPY,REMV LESN,SNARE: ICD-10-PCS | Mod: PT,,, | Performed by: INTERNAL MEDICINE

## 2023-07-28 PROCEDURE — E9220 PRA ENDO ANESTHESIA: ICD-10-PCS | Mod: ,,, | Performed by: NURSE ANESTHETIST, CERTIFIED REGISTERED

## 2023-07-28 PROCEDURE — 43239 PR EGD, FLEX, W/BIOPSY, SGL/MULTI: ICD-10-PCS | Mod: 59,,, | Performed by: INTERNAL MEDICINE

## 2023-07-28 RX ORDER — FENTANYL CITRATE 50 UG/ML
INJECTION, SOLUTION INTRAMUSCULAR; INTRAVENOUS
Status: DISCONTINUED | OUTPATIENT
Start: 2023-07-28 | End: 2023-07-28

## 2023-07-28 RX ORDER — PROPOFOL 10 MG/ML
VIAL (ML) INTRAVENOUS
Status: DISCONTINUED | OUTPATIENT
Start: 2023-07-28 | End: 2023-07-28

## 2023-07-28 RX ORDER — PROPOFOL 10 MG/ML
VIAL (ML) INTRAVENOUS CONTINUOUS PRN
Status: DISCONTINUED | OUTPATIENT
Start: 2023-07-28 | End: 2023-07-28

## 2023-07-28 RX ORDER — SODIUM CHLORIDE 9 MG/ML
INJECTION, SOLUTION INTRAVENOUS CONTINUOUS
Status: DISCONTINUED | OUTPATIENT
Start: 2023-07-28 | End: 2023-07-28 | Stop reason: HOSPADM

## 2023-07-28 RX ORDER — ONDANSETRON 2 MG/ML
INJECTION INTRAMUSCULAR; INTRAVENOUS
Status: DISCONTINUED | OUTPATIENT
Start: 2023-07-28 | End: 2023-07-28

## 2023-07-28 RX ORDER — LIDOCAINE HYDROCHLORIDE 20 MG/ML
INJECTION INTRAVENOUS
Status: DISCONTINUED | OUTPATIENT
Start: 2023-07-28 | End: 2023-07-28

## 2023-07-28 RX ADMIN — FENTANYL CITRATE 50 MCG: 50 INJECTION, SOLUTION INTRAMUSCULAR; INTRAVENOUS at 10:07

## 2023-07-28 RX ADMIN — PROPOFOL 110 MG: 10 INJECTION, EMULSION INTRAVENOUS at 09:07

## 2023-07-28 RX ADMIN — PROPOFOL 30 MG: 10 INJECTION, EMULSION INTRAVENOUS at 09:07

## 2023-07-28 RX ADMIN — PROPOFOL 30 MG: 10 INJECTION, EMULSION INTRAVENOUS at 10:07

## 2023-07-28 RX ADMIN — SODIUM CHLORIDE: 0.9 INJECTION, SOLUTION INTRAVENOUS at 09:07

## 2023-07-28 RX ADMIN — ONDANSETRON 4 MG: 2 INJECTION INTRAMUSCULAR; INTRAVENOUS at 10:07

## 2023-07-28 RX ADMIN — LIDOCAINE HYDROCHLORIDE 100 MG: 20 INJECTION INTRAVENOUS at 09:07

## 2023-07-28 RX ADMIN — Medication 225 MCG/KG/MIN: at 09:07

## 2023-07-28 NOTE — ANESTHESIA PREPROCEDURE EVALUATION
07/28/2023  Nora Burger is a 63 y.o., female.    Procedures:        COLONOSCOPY (Abdomen) - from referral from Dr. Noah Monroy/ prep ins on portal - suprep per Pt request (Pt has clinic appt on 7/17/23 w/ DR. Shah, Pt may have EGD added, hold placed to follow colon on this date if needed) - ERW   prep instructions given to pt in clinic -   7/24 - Precall done. 0830 arrival time confirmed. SG       EGD (ESOPHAGOGASTRODUODENOSCOPY) (Abdomen)   Anesthesia type: Choice   Diagnosis: Screening for colon cancer [Z12.11]         Pre-operative evaluation for Procedure(s) (LRB):  COLONOSCOPY (N/A)  EGD (ESOPHAGOGASTRODUODENOSCOPY) (N/A)    @[id number]@@    Encounter Diagnosis   Name Primary?    Screen for colon cancer        Review of patient's allergies indicates:  No Known Allergies    Facility-Administered Medications Prior to Admission   Medication Dose Route Frequency Provider Last Rate Last Admin    testosterone cypionate injection 50 mg  50 mg Intramuscular Q28 Days Melania Austin NP   50 mg at 07/27/23 1000     Medications Prior to Admission   Medication Sig Dispense Refill Last Dose    aspirin (ECOTRIN) 81 MG EC tablet Take 81 mg by mouth once daily.       cholecalciferol, vitamin D3, (VITAMIN D3) 50 mcg (2,000 unit) Cap capsule Take 2 capsules (4,000 Units total) by mouth once daily. 180 capsule 3     diltiaZEM (CARDIZEM CD) 240 MG 24 hr capsule Take 1 capsule (240 mg total) by mouth once daily. 90 capsule 3     ergocalciferol (VITAMIN D2) 50,000 unit Cap Take 1 capsule (50,000 Units total) by mouth every 7 days. 12 capsule 3     estradiol 0.05 mg/24 hr td ptsw (VIVELLE-DOT) 0.05 mg/24 hr Place 1 patch onto the skin twice a week. 8 patch 11     ezetimibe (ZETIA) 10 mg tablet Take 1 tablet (10 mg total) by mouth once daily. 90 tablet 3     hydroCHLOROthiazide (HYDRODIURIL) 25 MG tablet  Take 1 tablet (25 mg total) by mouth once daily. 90 tablet 3     levothyroxine (SYNTHROID) 50 MCG tablet Take 1 tablet (50 mcg total) by mouth before breakfast. 90 tablet 3     losartan (COZAAR) 50 MG tablet Take 1 tablet (50 mg total) by mouth once daily. 90 tablet 3     pantoprazole (PROTONIX) 40 MG tablet Take 1 tablet (40 mg total) by mouth once daily. Taken 45-60 minutes before your 1st protein meal of the day breakfast 90 tablet 3     progesterone (PROMETRIUM) 100 MG capsule Take 1 capsule (100 mg total) by mouth nightly. 30 capsule 11     sodium,potassium,mag sulfates (SUPREP BOWEL PREP KIT) 17.5-3.13-1.6 gram SolR Take by mouth.            sodium chloride 0.9%         No current facility-administered medications on file prior to encounter.     Current Outpatient Medications on File Prior to Encounter   Medication Sig Dispense Refill    aspirin (ECOTRIN) 81 MG EC tablet Take 81 mg by mouth once daily.      diltiaZEM (CARDIZEM CD) 240 MG 24 hr capsule Take 1 capsule (240 mg total) by mouth once daily. 90 capsule 3    ezetimibe (ZETIA) 10 mg tablet Take 1 tablet (10 mg total) by mouth once daily. 90 tablet 3    hydroCHLOROthiazide (HYDRODIURIL) 25 MG tablet Take 1 tablet (25 mg total) by mouth once daily. 90 tablet 3    levothyroxine (SYNTHROID) 50 MCG tablet Take 1 tablet (50 mcg total) by mouth before breakfast. 90 tablet 3    losartan (COZAAR) 50 MG tablet Take 1 tablet (50 mg total) by mouth once daily. 90 tablet 3       Past Medical History:  No date: GERD (gastroesophageal reflux disease)  No date: Hypertension  No date: Hypothyroidism, unspecified    Past Surgical History:   Procedure Laterality Date    AUGMENTATION OF BREAST Bilateral     approx 6 years siicone    HYSTERECTOMY      age mid 40's or 50's DUB, endo.    OOPHORECTOMY         Social History     Tobacco Use   Smoking Status Former    Packs/day: 1.00    Years: 49.00    Pack years: 49.00    Types: Cigarettes    Quit date:  2022    Years since quittin.2   Smokeless Tobacco Not on file       Social History     Substance and Sexual Activity   Alcohol Use Yes    Alcohol/week: 1.0 standard drink    Types: 1 Cans of beer per week    Comment: occ       Physical Activity: Not on file         No results for input(s): HCT in the last 72 hours.  No results for input(s): PLT in the last 72 hours.  No results for input(s): K in the last 72 hours.  No results for input(s): CREATININE in the last 72 hours.  No results for input(s): GLU in the last 72 hours.  No results for input(s): PT in the last 72 hours.                    Pre-op Assessment          Review of Systems  Anesthesia Hx:  No problems with previous Anesthesia    Hematology/Oncology:  Hematology Normal   Oncology Normal     Cardiovascular:   Hypertension, well controlled Denies MI.    Denies Angina. MVP with infrequent asymptomatic tachy controlled well   Pulmonary:   COPD, mild Denies Asthma. Shortness of breath SOB climbing stairs, one flight ok.  Stopped walking 2 miles one month ago due to hip pain, can do one mile now   Renal/:   Denies Chronic Renal Disease.     Hepatic/GI:   GERD, poorly controlled Denies Liver Disease.    Neurological:   Denies TIA. Denies CVA. Denies Seizures.    Endocrine:   Denies Diabetes.        Physical Exam  General: Well nourished, Cooperative, Alert and Oriented    Airway:  Mallampati: II   Mouth Opening: Normal  TM Distance: Normal  Tongue: Normal  Neck ROM: Normal ROM        Anesthesia Plan  Type of Anesthesia, risks & benefits discussed:    Anesthesia Type: Gen Natural Airway  Intra-op Monitoring Plan: Standard ASA Monitors  Post Op Pain Control Plan: IV/PO Opioids PRN  Induction:  IV  Informed Consent: Informed consent signed with the Patient and all parties understand the risks and agree with anesthesia plan.  All questions answered.   ASA Score: 2  Day of Surgery Review of History & Physical: H&P Update referred to the  surgeon/provider.    Ready For Surgery From Anesthesia Perspective.     .

## 2023-07-28 NOTE — PROVATION PATIENT INSTRUCTIONS
Discharge Summary/Instructions after an Endoscopic Procedure  Patient Name: Nora Burger  Patient MRN: [id number]  Patient YOB: 1960 Friday, July 28, 2023  Charles Shah MD  Dear patient,  As a result of recent federal legislation (The Federal Cures Act), you may   receive lab or pathology results from your procedure in your [facility]   account before your physician is able to contact you. Your physician or   their representative will relay the results to you with their   recommendations at their soonest availability.  Thank you,  RESTRICTIONS:  During your procedure today, you received medications for sedation.  These   medications may affect your judgment, balance and coordination.  Therefore,   for 24 hours, you have the following restrictions:   - DO NOT drive a car, operate machinery, make legal/financial decisions,   sign important papers or drink alcohol.    ACTIVITY:  Today: no heavy lifting, straining or running due to procedural   sedation/anesthesia.  The following day: return to full activity including work.  DIET:  Eat and drink normally unless instructed otherwise.     TREATMENT FOR COMMON SIDE EFFECTS:  - Mild abdominal pain, nausea, belching, bloating or excessive gas:  rest,   eat lightly and use a heating pad.  - Sore Throat: treat with throat lozenges and/or gargle with warm salt   water.  - Because air was used during the procedure, expelling large amounts of air   from your rectum or belching is normal.  - If a bowel prep was taken, you may not have a bowel movement for 1-3 days.    This is normal.  SYMPTOMS TO WATCH FOR AND REPORT TO YOUR PHYSICIAN:  1. Abdominal pain or bloating, other than gas cramps.  2. Chest pain.  3. Back pain.  4. Signs of infection such as: chills or fever occurring within 24 hours   after the procedure.  5. Rectal bleeding, which would show as bright red, maroon, or black stools.   (A tablespoon of blood from the rectum is not serious, especially  if   hemorrhoids are present.)  6. Vomiting.  7. Weakness or dizziness.  GO DIRECTLY TO THE NEAREST EMERGENCY ROOM IF YOU HAVE ANY OF THE FOLLOWING:      Difficulty breathing              Chills and/or fever over 101 F   Persistent vomiting and/or vomiting blood   Severe abdominal pain   Severe chest pain   Black, tarry stools   Bleeding- more than one tablespoon   Any other symptom or condition that you feel may need urgent attention  Your doctor recommends these additional instructions:  If any biopsies were taken, your doctors clinic will contact you in 1 to 2   weeks with any results.  - Discharge patient to home.   - Follow an antireflux regimen indefinitely.   - Await pathology results.   - Telephone endoscopist for pathology results in 3 weeks.   - Use Protonix 40 mg once daily (or any other full strength proton pump   inhibitor) - best taken 45 minutes before your first protein containing   meal.   - Repeat upper endoscopy PRN for retreatment.   - Return to GI clinic at the next available appointment.   - The findings and recommendations were discussed with the patient.  For questions, problems or results please call your physician - Charles Shah MD at Work:  [phone].  [facility] [place], EMERGENCY ROOM PHONE NUMBER: [phone]  IF A COMPLICATION OR EMERGENCY SITUATION ARISES AND YOU ARE UNABLE TO REACH   YOUR PHYSICIAN - GO DIRECTLY TO THE EMERGENCY ROOM.  Charles Shah MD  7/28/2023 10:29:34 AM  This report has been verified and signed electronically.  Dear patient,  As a result of recent federal legislation (The Federal Cures Act), you may   receive lab or pathology results from your procedure in your [facility]   account before your physician is able to contact you. Your physician or   their representative will relay the results to you with their   recommendations at their soonest availability.  Thank you,  PROVATION

## 2023-07-28 NOTE — H&P
[facility]  History & Physical    Subjective:      Chief Complaint/Reason for Admission:    Colonoscopy for   1. Family history of colon cancer    2. Family history of colonic polyps    3. History of adenomatous polyp of colon     EGD for GERD and dysphagia.    Nora Burger is a 63 y.o. female.    Past Medical History:   Diagnosis Date    GERD (gastroesophageal reflux disease)     Hypertension     Hypothyroidism, unspecified      Past Surgical History:   Procedure Laterality Date    AUGMENTATION OF BREAST Bilateral     approx 6 years siicone    HYSTERECTOMY      age mid 40's or 50's DUB, endo.    OOPHORECTOMY       Family History   Problem Relation Age of Onset    Colon cancer Paternal Grandmother     Breast cancer Neg Hx     Ovarian cancer Neg Hx     Uterine cancer Neg Hx     Cervical cancer Neg Hx      Social History     Tobacco Use    Smoking status: Former     Packs/day: 1.00     Years: 49.00     Pack years: 49.00     Types: Cigarettes     Quit date: 2022     Years since quittin.2   Substance Use Topics    Alcohol use: Yes     Alcohol/week: 1.0 standard drink     Types: 1 Cans of beer per week     Comment: occ    Drug use: Not Currently       Facility-Administered Medications Prior to Admission   Medication    testosterone cypionate injection 50 mg     PTA Medications   Medication Sig    aspirin (ECOTRIN) 81 MG EC tablet Take 81 mg by mouth once daily.    cholecalciferol, vitamin D3, (VITAMIN D3) 50 mcg (2,000 unit) Cap capsule Take 2 capsules (4,000 Units total) by mouth once daily.    diltiaZEM (CARDIZEM CD) 240 MG 24 hr capsule Take 1 capsule (240 mg total) by mouth once daily.    ergocalciferol (VITAMIN D2) 50,000 unit Cap Take 1 capsule (50,000 Units total) by mouth every 7 days.    estradiol 0.05 mg/24 hr td ptsw (VIVELLE-DOT) 0.05 mg/24 hr Place 1 patch onto the skin twice a week.    ezetimibe (ZETIA) 10 mg tablet Take 1 tablet (10 mg total) by mouth once daily.     hydroCHLOROthiazide (HYDRODIURIL) 25 MG tablet Take 1 tablet (25 mg total) by mouth once daily.    levothyroxine (SYNTHROID) 50 MCG tablet Take 1 tablet (50 mcg total) by mouth before breakfast.    losartan (COZAAR) 50 MG tablet Take 1 tablet (50 mg total) by mouth once daily.    pantoprazole (PROTONIX) 40 MG tablet Take 1 tablet (40 mg total) by mouth once daily. Taken 45-60 minutes before your 1st protein meal of the day breakfast    progesterone (PROMETRIUM) 100 MG capsule Take 1 capsule (100 mg total) by mouth nightly.    sodium,potassium,mag sulfates (SUPREP BOWEL PREP KIT) 17.5-3.13-1.6 gram SolR Take by mouth.     Review of patient's allergies indicates:  No Known Allergies     Review of Systems   Constitutional:  Negative for fever.     Objective:      Vital Signs (Most Recent)       Vital Signs Range (Last 24H):       Physical Exam  Neurological:      Mental Status: She is alert and oriented to person, place, and time.         Assessment:      Colonoscopy for   1. Family history of colon cancer    2. Family history of colonic polyps    3. History of adenomatous polyp of colon       EGD for GERD and dysphagia.    Plan:    Colonoscopy for   1. Family history of colon cancer    2. Family history of colonic polyps    3. History of adenomatous polyp of colon       EGD for GERD and dysphagia.

## 2023-07-28 NOTE — TRANSFER OF CARE
"Anesthesia Transfer of Care Note    Patient: Nora Burger    Procedure(s) Performed: Procedure(s) (LRB):  COLONOSCOPY (N/A)  EGD (ESOPHAGOGASTRODUODENOSCOPY) (N/A)    Patient location: PACU    Anesthesia Type: general    Transport from OR: Transported from OR on room air with adequate spontaneous ventilation    Post pain: adequate analgesia    Post assessment: no apparent anesthetic complications    Post vital signs: stable    Level of consciousness: awake    Nausea/Vomiting: no nausea/vomiting    Complications: none    Transfer of care protocol was followed      Last vitals:   Visit Vitals  /62 (BP Location: Left arm, Patient Position: Lying)   Pulse 72   Temp 36.6 °C (97.9 °F) (Temporal)   Resp 16   Ht 5' 6" (1.676 m)   Wt 66.7 kg (147 lb)   SpO2 97%   Breastfeeding No   BMI 23.73 kg/m²     "

## 2023-07-28 NOTE — ANESTHESIA POSTPROCEDURE EVALUATION
Anesthesia Post Evaluation    Patient: Nora Burger    Procedure(s) Performed: Procedure(s) (LRB):  COLONOSCOPY (N/A)  EGD (ESOPHAGOGASTRODUODENOSCOPY) (N/A)    Final Anesthesia Type: general      Patient location during evaluation: PACU  Patient participation: Yes- Able to Participate  Level of consciousness: awake and alert and oriented  Post-procedure vital signs: reviewed and stable  Pain management: adequate  Airway patency: patent    PONV status at discharge: No PONV  Anesthetic complications: no      Cardiovascular status: stable  Respiratory status: unassisted, spontaneous ventilation and room air  Hydration status: euvolemic  Follow-up not needed.          Vitals Value Taken Time   /77 07/28/23 1056   Temp 36.5 °C (97.7 °F) 07/28/23 1025   Pulse 70 07/28/23 1056   Resp 28 07/28/23 1056   SpO2 99 % 07/28/23 1056         No case tracking events are documented in the log.      Pain/Mila Score: Mila Score: 10 (7/28/2023 10:55 AM)

## 2023-07-28 NOTE — PROVATION PATIENT INSTRUCTIONS
Discharge Summary/Instructions after an Endoscopic Procedure  Patient Name: Nora Burger  Patient MRN: [id number]  Patient YOB: 1960 Friday, July 28, 2023  Charles Shah MD  Dear patient,  As a result of recent federal legislation (The Federal Cures Act), you may   receive lab or pathology results from your procedure in your [facility]   account before your physician is able to contact you. Your physician or   their representative will relay the results to you with their   recommendations at their soonest availability.  Thank you,  RESTRICTIONS:  During your procedure today, you received medications for sedation.  These   medications may affect your judgment, balance and coordination.  Therefore,   for 24 hours, you have the following restrictions:   - DO NOT drive a car, operate machinery, make legal/financial decisions,   sign important papers or drink alcohol.    ACTIVITY:  Today: no heavy lifting, straining or running due to procedural   sedation/anesthesia.  The following day: return to full activity including work.  DIET:  Eat and drink normally unless instructed otherwise.     TREATMENT FOR COMMON SIDE EFFECTS:  - Mild abdominal pain, nausea, belching, bloating or excessive gas:  rest,   eat lightly and use a heating pad.  - Sore Throat: treat with throat lozenges and/or gargle with warm salt   water.  - Because air was used during the procedure, expelling large amounts of air   from your rectum or belching is normal.  - If a bowel prep was taken, you may not have a bowel movement for 1-3 days.    This is normal.  SYMPTOMS TO WATCH FOR AND REPORT TO YOUR PHYSICIAN:  1. Abdominal pain or bloating, other than gas cramps.  2. Chest pain.  3. Back pain.  4. Signs of infection such as: chills or fever occurring within 24 hours   after the procedure.  5. Rectal bleeding, which would show as bright red, maroon, or black stools.   (A tablespoon of blood from the rectum is not serious, especially  if   hemorrhoids are present.)  6. Vomiting.  7. Weakness or dizziness.  GO DIRECTLY TO THE NEAREST EMERGENCY ROOM IF YOU HAVE ANY OF THE FOLLOWING:      Difficulty breathing              Chills and/or fever over 101 F   Persistent vomiting and/or vomiting blood   Severe abdominal pain   Severe chest pain   Black, tarry stools   Bleeding- more than one tablespoon   Any other symptom or condition that you feel may need urgent attention  Your doctor recommends these additional instructions:  If any biopsies were taken, your doctors clinic will contact you in 1 to 2   weeks with any results.  - Discharge patient to home.   - Await pathology results.   - Telephone endoscopist for pathology results in 3 weeks.   - Repeat colonoscopy in 3 years for surveillance based on pathology results.     - Return to GI clinic.   - The findings and recommendations were discussed with the patient.  For questions, problems or results please call your physician - Charles Shah MD at Work:  [phone].  [facility], EMERGENCY ROOM PHONE NUMBER: [phone]  IF A COMPLICATION OR EMERGENCY SITUATION ARISES AND YOU ARE UNABLE TO REACH   YOUR PHYSICIAN - GO DIRECTLY TO THE EMERGENCY ROOM.  Charles Shah MD  7/28/2023 10:29:01 AM  This report has been verified and signed electronically.  Dear patient,  As a result of recent federal legislation (The Federal Cures Act), you may   receive lab or pathology results from your procedure in your [facility]   account before your physician is able to contact you. Your physician or   their representative will relay the results to you with their   recommendations at their soonest availability.  Thank you,  [employer]ATION

## 2023-07-31 ENCOUNTER — CLINICAL SUPPORT (OUTPATIENT)
Dept: REHABILITATION | Facility: OTHER | Age: 63
End: 2023-07-31
Payer: MEDICARE

## 2023-07-31 DIAGNOSIS — R27.8 COORDINATION IMPAIRMENT: ICD-10-CM

## 2023-07-31 DIAGNOSIS — M62.89 PELVIC FLOOR DYSFUNCTION: Primary | ICD-10-CM

## 2023-07-31 DIAGNOSIS — G89.29 CHRONIC LEFT HIP PAIN: ICD-10-CM

## 2023-07-31 DIAGNOSIS — M25.552 CHRONIC LEFT HIP PAIN: ICD-10-CM

## 2023-07-31 PROCEDURE — 97530 THERAPEUTIC ACTIVITIES: CPT | Mod: PN | Performed by: PHYSICAL THERAPIST

## 2023-07-31 PROCEDURE — 97110 THERAPEUTIC EXERCISES: CPT | Mod: PN | Performed by: PHYSICAL THERAPIST

## 2023-07-31 NOTE — PATIENT INSTRUCTIONS
Bridging, 2-3 sets of 10 with belt around knees  - Inhale to prepare, relax the belly and pelvic floor  - As you exhale, gently engage the transverse abdominis by drawing the belly button down to the spine  - Holding the belly button in, lift the hips (only lift as high as it is comfortable) and lower  - Inhale again to rest  *Don't hold your breath      Straight Leg Raise, 2 sets of 10  - Inhale to prepare, relax the belly and pelvic floor  - As you exhale, gently engage the transverse abdominis by drawing the belly button down to the spine  - Holding the hips level and the belly button down, gently lift one leg a few inches  - Inhale again to rest  *Don't hold your breath    Side-lying hip abduction, 2-3 sets of 5  - In side-lying, stack the hips on top of each other and lift the top leg a few inches. Hold the top hip in place, not rolling back with movement  *Don't hold your breath       Unilateral overhead press with dumbbell + marchsotero, 1-2 sets of 10          Side-stepping with band around knees, 2-3 minutes          Standing rows with band, 2 sets of 20  - Pull the theraband in towards the hips by bending the elbows, without moving the rest of the body  *Don't hold your breath  *Keep shoulder relaxed away from your ears        Standing shoulder extension with band, 2-3 sets of 10  - Pull the theraband down to the hips without moving the rest of the body  *Don't hold your breath  *Keep your shoulders down out of your ears        Pallof press, 2-3 sets of 10  - Stand up tall with hands on band at the chest  - Push the band straight ahead of your body, without rotating  *Don't hold your breath        Sit to stand + overhead press, 2 sets of 10 with 5# dumbbells  - Stand up from chair and lift 5# weights overhead  - Bring the weights back to your elbows and sit down again  *Don't hold your breath

## 2023-08-02 LAB
FINAL PATHOLOGIC DIAGNOSIS: NORMAL
GROSS: NORMAL
Lab: NORMAL
MICROSCOPIC EXAM: NORMAL

## 2023-08-10 ENCOUNTER — PATIENT MESSAGE (OUTPATIENT)
Dept: GASTROENTEROLOGY | Facility: CLINIC | Age: 63
End: 2023-08-10
Payer: MEDICARE

## 2023-08-15 ENCOUNTER — PATIENT MESSAGE (OUTPATIENT)
Dept: GASTROENTEROLOGY | Facility: CLINIC | Age: 63
End: 2023-08-15

## 2023-08-15 ENCOUNTER — OFFICE VISIT (OUTPATIENT)
Dept: GASTROENTEROLOGY | Facility: CLINIC | Age: 63
End: 2023-08-15
Payer: MEDICARE

## 2023-08-15 ENCOUNTER — TELEPHONE (OUTPATIENT)
Dept: ENDOSCOPY | Facility: HOSPITAL | Age: 63
End: 2023-08-15
Payer: MEDICARE

## 2023-08-15 DIAGNOSIS — R10.13 EPIGASTRIC PAIN: Primary | ICD-10-CM

## 2023-08-15 DIAGNOSIS — K29.70 HELICOBACTER PYLORI GASTRITIS: ICD-10-CM

## 2023-08-15 DIAGNOSIS — B96.81 HELICOBACTER PYLORI GASTRITIS: ICD-10-CM

## 2023-08-15 PROCEDURE — 99214 OFFICE O/P EST MOD 30 MIN: CPT | Mod: 95,,, | Performed by: INTERNAL MEDICINE

## 2023-08-15 PROCEDURE — 99214 PR OFFICE/OUTPT VISIT, EST, LEVL IV, 30-39 MIN: ICD-10-PCS | Mod: 95,,, | Performed by: INTERNAL MEDICINE

## 2023-08-15 RX ORDER — METRONIDAZOLE 250 MG/1
250 TABLET ORAL EVERY 6 HOURS
Qty: 56 TABLET | Refills: 0 | Status: SHIPPED | OUTPATIENT
Start: 2023-08-15 | End: 2023-08-29

## 2023-08-15 RX ORDER — OMEPRAZOLE 20 MG/1
20 CAPSULE, DELAYED RELEASE ORAL EVERY 12 HOURS
Qty: 28 CAPSULE | Refills: 0 | Status: SHIPPED | OUTPATIENT
Start: 2023-08-15 | End: 2023-12-05 | Stop reason: SDUPTHER

## 2023-08-15 RX ORDER — DOXYCYCLINE HYCLATE 100 MG
100 TABLET ORAL EVERY 12 HOURS
Qty: 28 TABLET | Refills: 0 | Status: SHIPPED | OUTPATIENT
Start: 2023-08-15 | End: 2023-08-29

## 2023-08-15 NOTE — PROGRESS NOTES
The patient location is:  At home  The chief complaint leading to consultation is:  Epigastric pain and H pylori gastritis    Visit type: audiovisual    Face to Face time with patient: 30 minutes of total time spent on the encounter, which includes face to face time and non-face to face time preparing to see the patient (eg, review of tests), Obtaining and/or reviewing separately obtained history, Documenting clinical information in the electronic or other health record, Independently interpreting results (not separately reported) and communicating results to the patient/family/caregiver, or Care coordination (not separately reported).       Each patient to whom he or she provides medical services by telemedicine is:  (1) informed of the relationship between the physician and patient and the respective role of any other health care provider with respect to management of the patient; and (2) notified that he or she may decline to receive medical services by telemedicine and may withdraw from such care at any time.    Notes:          [facility] Consultation Note    Reason for Consult:  The primary encounter diagnosis was Epigastric pain. A diagnosis of Helicobacter pylori gastritis was also pertinent to this visit.    PCP:   Noah Monroy       Referring MD:  No referring provider defined for this encounter.    Initial History of Present Illness (HPI):  This is a 63 y.o. female here for evaluation of a treatment evaluation for H pylori she recently underwent an EGD colonoscopy EGD showed H pylori gastritis will treat her with quadruple therapy for 14 days will do a stool for H pylori antigen in 3 months she know she has to be off PPIs and H2 blockers and antibiotics for appropriate amount of time that information is been sent to her and gone over with her in clinic today.  No history of abdominal trauma no weight loss no dysphagia heartburn pretty well controlled her symptoms got a little worse  after endoscopy she was on Protonix but did not have erosive esophagitis had a little bit of lower esophageal ring although she got back on another medicine in her symptoms still got worse she got off her PPIs so it is not likely that her biopsy show H pylori but that is probably an incidental finding that is nothing new so will treat her for H pylori will get fasting lab work and a ultrasound she will avoid alcohol in the meantime she stopped taking NSAIDs it is okay for her to take an aspirin if she needs to prevent heart attack or stroke but not for aches or pains right now.    Abdominal pain - as above  Reflux - no  Dysphagia - no   Bowel habits - normal  GI bleeding - none  NSAID usage - none    Interval HPI 08/15/2023:  The patient's last visit with me was on 7/17/2023.      ROS:  Constitutional: No fevers, chills, No weight loss  ENT:  occasional heartburn no dysphagia no odynophagia no hoarseness  CV: No chest pain, no palpitation  Pulm: No cough, no wheezing  Ophtho: No vision changes  GI: see HPI  Derm: No rash, no itching  Heme: No lymphadenopathy, No easy bruising  MSK:  Has arthritis  : No dysuria, No hematuria  Endo: No hot or cold intolerance  Neuro: No syncope, No seizure, no strokes  Psych: No uncontrolled anxiety, No uncontrolled depression    Medical History:  has a past medical history of GERD (gastroesophageal reflux disease), Hypertension, and Hypothyroidism, unspecified.    Surgical History:  has a past surgical history that includes Hysterectomy; Augmentation of breast (Bilateral); Oophorectomy; Colonoscopy (N/A, 7/28/2023); and Esophagogastroduodenoscopy (N/A, 7/28/2023).    Family History: family history includes Colon cancer in her paternal grandfather..     Social History:  reports that she quit smoking about 15 months ago. Her smoking use included cigarettes. She started smoking about 50 years ago. She has a 49.0 pack-year smoking history. She does not have any smokeless tobacco  history on file. She reports current alcohol use of about 1.0 standard drink of alcohol per week. She reports that she does not currently use drugs.    Review of patient's allergies indicates:  No Known Allergies    Medication List with Changes/Refills   New Medications    BISMUTH SUBSALICYLATE 262 MG TAB    Take 2 tablets by mouth every 6 (six) hours. H pylori treatment for 14 days    DOXYCYCLINE (VIBRA-TABS) 100 MG TABLET    Take 1 tablet (100 mg total) by mouth every 12 (twelve) hours. H pylori treatment for 14 days    METRONIDAZOLE (FLAGYL) 250 MG TABLET    Take 1 tablet (250 mg total) by mouth every 6 (six) hours. H pylori treatment for 14 days    OMEPRAZOLE (PRILOSEC) 20 MG CAPSULE    Take 1 capsule (20 mg total) by mouth every 12 (twelve) hours. H pylori treatment for 14 days   Current Medications    ASPIRIN (ECOTRIN) 81 MG EC TABLET    Take 81 mg by mouth once daily.    CHOLECALCIFEROL, VITAMIN D3, (VITAMIN D3) 50 MCG (2,000 UNIT) CAP CAPSULE    Take 2 capsules (4,000 Units total) by mouth once daily.    DILTIAZEM (CARDIZEM CD) 240 MG 24 HR CAPSULE    Take 1 capsule (240 mg total) by mouth once daily.    ERGOCALCIFEROL (VITAMIN D2) 50,000 UNIT CAP    Take 1 capsule (50,000 Units total) by mouth every 7 days.    ESTRADIOL 0.05 MG/24 HR TD PTSW (VIVELLE-DOT) 0.05 MG/24 HR    Place 1 patch onto the skin twice a week.    EZETIMIBE (ZETIA) 10 MG TABLET    Take 1 tablet (10 mg total) by mouth once daily.    HYDROCHLOROTHIAZIDE (HYDRODIURIL) 25 MG TABLET    Take 1 tablet (25 mg total) by mouth once daily.    LEVOTHYROXINE (SYNTHROID) 50 MCG TABLET    Take 1 tablet (50 mcg total) by mouth before breakfast.    LOSARTAN (COZAAR) 50 MG TABLET    Take 1 tablet (50 mg total) by mouth once daily.    PROGESTERONE (PROMETRIUM) 100 MG CAPSULE    Take 1 capsule (100 mg total) by mouth nightly.    SODIUM,POTASSIUM,MAG SULFATES (SUPREP BOWEL PREP KIT) 17.5-3.13-1.6 GRAM SOLR    Take by mouth.   Discontinued Medications     PANTOPRAZOLE (PROTONIX) 40 MG TABLET    Take 1 tablet (40 mg total) by mouth once daily. Taken 45-60 minutes before your 1st protein meal of the day breakfast         Objective Findings:    Vital Signs:  There were no vitals taken for this visit.  There is no height or weight on file to calculate BMI.    Physical Exam:  Telemedicine video visit  General Appearance: Well appearing in no acute distress  Neurologic:  Alert and oriented x4  Psychiatric:  Normal speech mentation and affect    Labs:  Lab Results   Component Value Date    WBC 6.34 02/07/2023    HGB 15.2 02/07/2023    HCT 47.0 02/07/2023     02/07/2023    CHOL 164 02/07/2023    TRIG 77 02/07/2023    HDL 63 02/07/2023    ALT 12 07/17/2023    AST 15 07/17/2023     07/17/2023    K 4.0 07/17/2023     07/17/2023    CREATININE 0.8 07/17/2023    BUN 15 07/17/2023    CO2 26 07/17/2023    TSH 1.493 06/12/2023    HGBA1C 5.3 02/07/2023              Medical Decision Making:  Lab work reviewed  EGD and Colonoscopy images and path reviewed  H pylori talk given  Fasting lab talk given abdominal ultrasound talk given  GI MA team- Please tell patient that their EGD pathology is positive for and H. Pylori infection and recommend Quadruple therapy treatment for H. Pylori with:    Omeprazole 20mg 12 hours for 14 days  Doxycycline 100mg every 12 hours for 14 days  Metronidazole 250mg every 6 hours for 14 days  Bismuth subsalicylate 524mg every 6 hours for 14 days.     Don't drink alcohol on these medicines. Take after meals and drink plenty of water to make sure all pills clear esophagus.      Please mail patient UpToDate patient information on H. Pylori and have patient read this prior to beginning treatment.    GI MA team - please order stool for H. Pylori Antigen in 12 weeks.    Please tell patient to check for H. Pylori eradiation we would like to check stool sample in 12 weeks which tells us if H. Pylori has been successfully eradicated with the prior  treatment medications.      H. Pylori antigen test which tells us if H. Pylori has been successfully eradicated with the prior treatment medications, and please tell patient that Stool H. Pylori antigen needs to be done off the following medications for the following amount of time:    1. Off all Antibiotics for 4 (Four) weeks before stool collection.      2. Off all Proton Pump Inhibitors medications for 2 (Two) weeks before collecting stool for H. Pylori Antigen:  :  Nexium (esomeprazole)  Prilosec (omeprazole)   Protonix (pantoprazole)  Prevacid (lansoprazole)  Aciphex (rabeprazole)  Dexilant (dexlansoprazole)    Zegerid     3. Off all H2 blockers medications for 2 (Two) weeks before collecting stool for H. Pylori Antigen:    Zantac (ranitidine)  Tagamet (cimetadine)  Axid (nizatidine)   Pepcid (famotidine)    4. Off Pepto-Bismol for 4 (four) weeks prior to collecting stool for H. Pylori Antigen.    The Olympus scope GIF-                          ([id number]) was introduced through the mouth, and                          advanced to the third part of duodenum. The upper                          GI endoscopy was accomplished without difficulty.                          The patient tolerated the procedure well.   Findings:        The examined duodenum was normal.        Patchy mildly erythematous mucosa without bleeding was found in the        gastric body, in the gastric antrum and in the prepyloric region of        the stomach. Biopsies were taken with a cold forceps for histology.        Biopsies were taken with a cold forceps for histology. Estimated        blood loss was minimal.        The cardia and gastric fundus were normal on retroflexion.        A 2 cm hiatal hernia was found. The proximal extent of the gastric        folds (end of tubular esophagus) was 40 cm from the incisors. The        hiatal narrowing was 42 cm from the incisors. The Z-line was 40 cm        from the incisors.        A low-grade of  narrowing, non-obstructing and mild Schatzki ring was        found at the gastroesophageal junction. A TTS dilator was passed        through the scope. Dilation with a 15-16.5-18 mm balloon dilator was        performed to 18 mm. The dilation site was examined following        endoscope reinsertion and showed no perforation.   Impression:            - Normal examined duodenum.                          - Erythematous mucosa in the gastric body, antrum                          and prepyloric region of the stomach. Biopsied.                          - 2 cm hiatal hernia.                          - Low-grade of narrowing, non-obstructing and mild                          Schatzki ring. Dilated.   Recommendation:        - Discharge patient to home.                          - Follow an antireflux regimen indefinitely.                          - Await pathology results.                          - Telephone endoscopist for pathology results in 3                          weeks.                          - Use Protonix 40 mg once daily (or any other full                          strength proton pump inhibitor) - best taken 45                          minutes before your first protein containing meal.                          - Repeat upper endoscopy PRN for retreatment.                          - Return to GI clinic at the next available                          appointment.                          - The findings and recommendations were discussed                          with the patient.   Attending Participation:        I personally performed the entire procedure.   Charles Shah MD   7/28/2023   The Olympus scope PCF-H190DL ([id number]) was                          introduced through the anus and advanced to the                          terminal ileum, with identification of the                          appendiceal orifice and IC valve. The colonoscopy                          was performed without difficulty. The patient                           tolerated the procedure well. The quality of the                          bowel preparation was excellent. Scope insertion                          time was 9 minutes. Scope withdrawal time was 10                          minutes. The total duration of the procedure was                          19 minutes. The terminal ileum, ileocecal valve,                          appendiceal orifice, and rectum were photographed.   Findings:        The terminal ileum appeared normal.        A 1 mm polyp was found in the cecum. The polyp was sessile. The        polyp was removed with a cold snare. Resection and retrieval were        complete. Estimated blood loss was minimal.        Diverticula were found in the recto-sigmoid colon, sigmoid colon,        descending colon, transverse colon and ascending colon.        The colon (entire examined portion) was moderately tortuous.        The perianal and digital rectal examinations were normal.        The retroflexed view of the distal rectum and anal verge was normal        and showed no anal or rectal abnormalities.        Non-bleeding internal hemorrhoids were found during retroflexion.        The hemorrhoids were mild.   Impression:            - The examined portion of the ileum was normal.                          - One 1 mm polyp in the cecum, removed with a cold                          snare. Resected and retrieved.                          - Diverticulosis in the recto-sigmoid colon, in                          the sigmoid colon, in the descending colon, in the                          transverse colon and in the ascending colon.                          - Tortuous colon.                          - Non-bleeding internal hemorrhoids.   Recommendation:        - Discharge patient to home.                          - Await pathology results.                          - Telephone endoscopist for pathology results in 3                          weeks.                           - Repeat colonoscopy in 3 years for surveillance                          based on pathology results.                          - Return to GI clinic.                          - The findings and recommendations were discussed                          with the patient.   Attending Participation:        I personally performed the entire procedure.   Charles Shah MD   7/28/2023   1. Stomach, biopsy:   - Helicobacter pylori-associated chronic mildly active gastritis   - Negative for intestinal metaplasia   - Negative for dysplasia or carcinoma     2. Colon, cecal polypectomy, biopsy:   - Benign polypoid piece of colonic mucosa with prominent lymphoid aggregate   - Negative for dysplasia or carcinoma on multiple examined H&E levels    Comment: Interp By Mike Polanco M.D., Signed on 08/02/2023      Assessment:  1. Epigastric pain    2. Helicobacter pylori gastritis    3.      Schatzki ring s/p esophageal dilation and dysphagia improved     Recommendations:  1. 12 hour fasting blood work this week across the street [facility]  2. Abdominal ultrasound rule out gallstones  3. Avoid alcohol in the meantime if patient drinks  4. Treat H pylori quadruple therapy check stool for H pylori antigen in 12 weeks for eradication  5. Return GI clinic 4-6 weeks for follow-up sooner if symptoms worsen     Follow up in about 4 weeks (around 9/12/2023).      Order summary:  Orders Placed This Encounter    US Abdomen Complete    H. pylori antigen, stool    Comprehensive Metabolic Panel    CBC Auto Differential    TISSUE TRANSGLUTAMINASE (TTG), IGA    IgA    Lipase    omeprazole (PRILOSEC) 20 MG capsule    bismuth subsalicylate 262 mg Tab    doxycycline (VIBRA-TABS) 100 MG tablet    metroNIDAZOLE (FLAGYL) 250 MG tablet         Thank you so much for allowing me to participate in the care of Nora Burger    Charles Shah MD    DISCLAIMER: This note was prepared with MModal voice  recognition transcription software. Garbled syntax, mangled or inadvertent pronouns, and other bizarre constructions may be attributed to that software system. While efforts were made to correct any mistakes made by this voice recognition program, some errors and/or omissions may remain in the note that were missed when the note was originally created.

## 2023-08-15 NOTE — TELEPHONE ENCOUNTER
----- Message from Charles Shah MD sent at 8/15/2023  6:47 PM CDT -----  GI MA team- Please tell patient that their EGD pathology is positive for and H. Pylori infection and recommend Quadruple therapy treatment for H. Pylori with:    Omeprazole 20mg 12 hours for 14 days  Doxycycline 100mg every 12 hours for 14 days  Metronidazole 250mg every 6 hours for 14 days  Bismuth subsalicylate 524mg every 6 hours for 14 days.     Don't drink alcohol on these medicines. Take after meals and drink plenty of water to make sure all pills clear esophagus.      Please mail patient UpToDate patient information on H. Pylori and have patient read this prior to beginning treatment.    GI MA team - please order stool for H. Pylori Antigen in 12 weeks.    Please tell patient to check for H. Pylori eradiation we would like to check stool sample in 12 weeks which tells us if H. Pylori has been successfully eradicated with the prior treatment medications.      H. Pylori antigen test which tells us if H. Pylori has been successfully eradicated with the prior treatment medications, and please tell patient that Stool H. Pylori antigen needs to be done off the following medications for the following amount of time:    1. Off all Antibiotics for 4 (Four) weeks before stool collection.      2. Off all Proton Pump Inhibitors medications for 2 (Two) weeks before collecting stool for H. Pylori Antigen:  :  Nexium (esomeprazole)  Prilosec (omeprazole)   Protonix (pantoprazole)  Prevacid (lansoprazole)  Aciphex (rabeprazole)  Dexilant (dexlansoprazole)    Zegerid     3. Off all H2 blockers medications for 2 (Two) weeks before collecting stool for H. Pylori Antigen:    Zantac (ranitidine)  Tagamet (cimetadine)  Axid (nizatidine)   Pepcid (famotidine)    4. Off Pepto-Bismol for 4 (four) weeks prior to collecting stool for H. Pylori Antigen.

## 2023-08-15 NOTE — PROGRESS NOTES
GI MA team- Please tell patient that their EGD pathology is positive for and H. Pylori infection and recommend Quadruple therapy treatment for H. Pylori with:    Omeprazole 20mg 12 hours for 14 days  Doxycycline 100mg every 12 hours for 14 days  Metronidazole 250mg every 6 hours for 14 days  Bismuth subsalicylate 524mg every 6 hours for 14 days.     Don't drink alcohol on these medicines. Take after meals and drink plenty of water to make sure all pills clear esophagus.      Please mail patient UpToDate patient information on H. Pylori and have patient read this prior to beginning treatment.    GI MA team - please order stool for H. Pylori Antigen in 12 weeks.    Please tell patient to check for H. Pylori eradiation we would like to check stool sample in 12 weeks which tells us if H. Pylori has been successfully eradicated with the prior treatment medications.      H. Pylori antigen test which tells us if H. Pylori has been successfully eradicated with the prior treatment medications, and please tell patient that Stool H. Pylori antigen needs to be done off the following medications for the following amount of time:    1. Off all Antibiotics for 4 (Four) weeks before stool collection.      2. Off all Proton Pump Inhibitors medications for 2 (Two) weeks before collecting stool for H. Pylori Antigen:  :  Nexium (esomeprazole)  Prilosec (omeprazole)   Protonix (pantoprazole)  Prevacid (lansoprazole)  Aciphex (rabeprazole)  Dexilant (dexlansoprazole)    Zegerid     3. Off all H2 blockers medications for 2 (Two) weeks before collecting stool for H. Pylori Antigen:    Zantac (ranitidine)  Tagamet (cimetadine)  Axid (nizatidine)   Pepcid (famotidine)    4. Off Pepto-Bismol for 4 (four) weeks prior to collecting stool for H. Pylori Antigen.

## 2023-08-15 NOTE — Clinical Note
Criselda please schedule Vani for 12 hour fasting blood work at 7:00 a.m. across the street [facility] this [facility].  Orders placed  Please schedule patient for abdominal ultrasound next available at the imaging center across the street  Please schedule patient follow-up GI clinic appointment with me 4-6 weeks telemedicine video visit  Thank you

## 2023-08-17 ENCOUNTER — HOSPITAL ENCOUNTER (OUTPATIENT)
Dept: RADIOLOGY | Facility: HOSPITAL | Age: 63
Discharge: HOME | End: 2023-08-17
Attending: INTERNAL MEDICINE
Payer: MEDICARE

## 2023-08-17 DIAGNOSIS — R10.13 EPIGASTRIC PAIN: ICD-10-CM

## 2023-08-17 PROCEDURE — 76700 US EXAM ABDOM COMPLETE: CPT | Mod: TC

## 2023-08-17 PROCEDURE — 76700 US EXAM ABDOM COMPLETE: CPT | Mod: 26,,, | Performed by: RADIOLOGY

## 2023-08-17 PROCEDURE — 76700 US ABDOMEN COMPLETE: ICD-10-PCS | Mod: 26,,, | Performed by: RADIOLOGY

## 2023-08-18 ENCOUNTER — PATIENT MESSAGE (OUTPATIENT)
Dept: GASTROENTEROLOGY | Facility: CLINIC | Age: 63
End: 2023-08-18
Payer: MEDICARE

## 2023-08-19 NOTE — PROGRESS NOTES
Subjective:           Chief Complaint: No chief complaint on file.      HPI    Virtual visit    63 year old female with coronary and aortic atherosclerosis noted on CT, HTN, hypothyroidism, history of tobacco abuse, mild emphysema here for follow-up. KERMIT was negative for ischemia with above average exercise and achieving 89% MPHR. For palpitations she had a Holter demonstrating occasional ectopy and her symptoms correlated with NSR.    She had an EGD and has been diagnosed with H pylori and is on triple therapy. She otherwise feels well, has chest discomfort following meals, relieved by belching. She reports no chest discomfort with physical exertion but has dyspnea on exertion (e.g. climbing a flight of stairs). She denies orthopnea, peripheral edema, syncope or claudication. She has had PND and is scheduled for PSG. She continues to have occasional palpitations, unchanged from prior to wearing the Holter.    She has 16 steps at home and becomes mildly short of breath when she climbs the stairs.     She quit smoking one year ago, she is not drinking alcohol these days due to her stomach issues.    Brother had an MI in his 50's and needed PCI (heavy smoker/drinker and overweight). Father was cardioverted twice for AFib in his 70's.    Review of Systems  Pertinent findings as per HPI.        Objective:      There were no vitals filed for this visit.      Virtual visit      Assessment:       1. Coronary artery calcification seen on CT scan    2. Aortic atherosclerosis    3. Primary hypertension    4. Palpitations            Plan     Coronary artery calcification seen on CT scan  CCS 0  KERMIT negative for ischemia at target HR with above average exercise capacity  Taking ASA, zetia, losartan  Did not tolerate crestor due to myalgia  LDL 85  Having some myalgia with zetia, she would like to continue for now but may consider bempedoic acid or PCSK9i in the future if not tolerated    Palpitations  Occasional ectopy,  symptoms correlate with NSR  Reassurance provided    Aortic atherosclerosis  As above    Primary hypertension  For blood pressure readings, sit in a calm, quiet room with dim lighting. Feet flat on the floor, without stimulation (no TV, no talking) and relax for at least 3-5 minutes prior to measuring.  Keep a BP log  Virtual visit in 2-3 months  Scheduled for PSG  Has not been checking, encouraged regular home measurements    RTC 1 year, sooner PRN

## 2023-08-22 ENCOUNTER — OFFICE VISIT (OUTPATIENT)
Dept: CARDIOLOGY | Facility: CLINIC | Age: 63
End: 2023-08-22
Payer: MEDICARE

## 2023-08-22 DIAGNOSIS — I70.0 AORTIC ATHEROSCLEROSIS: ICD-10-CM

## 2023-08-22 DIAGNOSIS — I10 PRIMARY HYPERTENSION: ICD-10-CM

## 2023-08-22 DIAGNOSIS — I25.10 CORONARY ARTERY CALCIFICATION SEEN ON CT SCAN: Primary | ICD-10-CM

## 2023-08-22 DIAGNOSIS — R00.2 PALPITATIONS: ICD-10-CM

## 2023-08-22 PROCEDURE — 99214 OFFICE O/P EST MOD 30 MIN: CPT | Mod: 95,,, | Performed by: INTERNAL MEDICINE

## 2023-08-22 PROCEDURE — 99214 PR OFFICE/OUTPT VISIT, EST, LEVL IV, 30-39 MIN: ICD-10-PCS | Mod: 95,,, | Performed by: INTERNAL MEDICINE

## 2023-08-23 NOTE — PROGRESS NOTES
Important:    Criselda could you please schedule Vani for advanced endoscopy clinic or EUS of the pancreas for further evaluation of her peripancreatic lymph node and epigastric pain thank you.    Tri your ultrasound for epigastric pain shows a peripancreatic lymph node nonspecific I am going to refer you to her advanced endoscopy Service for further evaluation with endoscopic ultrasound of this this is like an upper endoscopy where they can go down your esophagus with an ultrasound probe get close to the pancreas and even sample lymph node if need be and get a good look at the pancreas referral has been placed.    Impression:    Pancreas: The visualized portions of pancreas appear normal.  Peripancreatic lymph node measuring 1.4 x 0.7 x 2.2 cm.     Mild hepatomegaly and hepatic steatosis.     Biliary crystal versus tiny gallstone.  No evidence of acute cholecystitis.        Electronically signed by: Nakul White MD  Date:                                            08/17/2023

## 2023-08-24 ENCOUNTER — TELEPHONE (OUTPATIENT)
Dept: ENDOSCOPY | Facility: HOSPITAL | Age: 63
End: 2023-08-24

## 2023-08-24 DIAGNOSIS — R93.89 ABNORMAL FINDING ON IMAGING: Primary | ICD-10-CM

## 2023-08-25 ENCOUNTER — TELEPHONE (OUTPATIENT)
Dept: ENDOSCOPY | Facility: HOSPITAL | Age: 63
End: 2023-08-25
Payer: MEDICARE

## 2023-08-25 ENCOUNTER — PATIENT MESSAGE (OUTPATIENT)
Dept: ENDOSCOPY | Facility: HOSPITAL | Age: 63
End: 2023-08-25
Payer: MEDICARE

## 2023-08-25 ENCOUNTER — PATIENT MESSAGE (OUTPATIENT)
Dept: GASTROENTEROLOGY | Facility: CLINIC | Age: 63
End: 2023-08-25
Payer: MEDICARE

## 2023-08-25 NOTE — TELEPHONE ENCOUNTER
Need EUS (linear) for peripancreatic lymph nodes. 45 minutes. Main or [facility].   Thanks,   Simone Mckee MD

## 2023-08-25 NOTE — TELEPHONE ENCOUNTER
Spoke to patient to schedule procedure(s) Upper Endoscopy Ultrasound (EUS)       Physician to perform procedure(s) Dr. RAGHU Mckee  Date of Procedure (s) 9/19/23  Arrival Time 1:00 PM  Time of Procedure(s) 2:00 PM   Location of Procedure(s) [address]  Type of Rx Prep sent to patient: Other  Instructions provided to patient via [facility]    Patient was informed on the following information and verbalized understanding. Screening questionnaire reviewed with patient and complete. If procedure requires anesthesia, a responsible adult needs to be present to accompany the patient home, patient cannot drive after receiving anesthesia. Appointment details are tentative, especially check-in time. Patient will receive a prep-op call 4 days prior to confirm check-in time for procedure. If applicable the patient should contact their pharmacy to verify Rx for procedure prep is ready for pick-up. Patient was advised to call the scheduling department at [phone] if pharmacy states no Rx is available. Patient was advised to call the endoscopy scheduling department if any questions or concerns arise.      [facility] Scheduling Department

## 2023-08-28 ENCOUNTER — PATIENT MESSAGE (OUTPATIENT)
Dept: OBSTETRICS AND GYNECOLOGY | Facility: CLINIC | Age: 63
End: 2023-08-28
Payer: MEDICARE

## 2023-08-28 ENCOUNTER — TELEPHONE (OUTPATIENT)
Dept: OBSTETRICS AND GYNECOLOGY | Facility: CLINIC | Age: 63
End: 2023-08-28
Payer: MEDICARE

## 2023-08-28 NOTE — TELEPHONE ENCOUNTER
8/28/23 @ 0940 Pt states she is scheduled for a pancreatic biopsy in September due to a enlarged lymph node. She will skip her testosterone injection today.           ----- Message from Marlena Santos sent at 8/28/2023  9:17 AM CDT -----  Regarding: Same Day Appointment              Name of Who is Calling: Nora Burger    Who Left The Message: Nora Burger      What is the request in detail:      Patient called requesting a call regarding today's appointment with the Injection Nurse; patient has questions and concerns.   Please further advise.   Thank you      Reply by [facility]: YES    Preferred Call Back :  [phone] (S)

## 2023-09-05 ENCOUNTER — OFFICE VISIT (OUTPATIENT)
Dept: SLEEP MEDICINE | Facility: CLINIC | Age: 63
End: 2023-09-05
Payer: MEDICARE

## 2023-09-05 VITALS
HEIGHT: 66 IN | SYSTOLIC BLOOD PRESSURE: 140 MMHG | HEART RATE: 66 BPM | DIASTOLIC BLOOD PRESSURE: 76 MMHG | WEIGHT: 147.06 LBS | BODY MASS INDEX: 23.64 KG/M2

## 2023-09-05 DIAGNOSIS — F51.09 OTHER INSOMNIA NOT DUE TO A SUBSTANCE OR KNOWN PHYSIOLOGICAL CONDITION: ICD-10-CM

## 2023-09-05 DIAGNOSIS — I70.0 AORTIC ATHEROSCLEROSIS: ICD-10-CM

## 2023-09-05 DIAGNOSIS — G47.10 HYPERSOMNOLENCE: ICD-10-CM

## 2023-09-05 DIAGNOSIS — R35.1 NOCTURIA: ICD-10-CM

## 2023-09-05 DIAGNOSIS — G47.33 OSA (OBSTRUCTIVE SLEEP APNEA): Primary | ICD-10-CM

## 2023-09-05 DIAGNOSIS — I25.10 CORONARY ARTERY CALCIFICATION SEEN ON CT SCAN: ICD-10-CM

## 2023-09-05 PROCEDURE — 99999 PR PBB SHADOW E&M-EST. PATIENT-LVL III: ICD-10-PCS | Mod: PBBFAC,,, | Performed by: INTERNAL MEDICINE

## 2023-09-05 PROCEDURE — 99204 PR OFFICE/OUTPT VISIT, NEW, LEVL IV, 45-59 MIN: ICD-10-PCS | Mod: S$PBB,,, | Performed by: INTERNAL MEDICINE

## 2023-09-05 PROCEDURE — 99213 OFFICE O/P EST LOW 20 MIN: CPT | Mod: PBBFAC | Performed by: INTERNAL MEDICINE

## 2023-09-05 PROCEDURE — 99204 OFFICE O/P NEW MOD 45 MIN: CPT | Mod: S$PBB,,, | Performed by: INTERNAL MEDICINE

## 2023-09-05 PROCEDURE — 99999 PR PBB SHADOW E&M-EST. PATIENT-LVL III: CPT | Mod: PBBFAC,,, | Performed by: INTERNAL MEDICINE

## 2023-09-05 NOTE — PROGRESS NOTES
"  NEW PATIENT VISIT    Nora Burger  is a pleasant 63 y.o. female  with PMH significant for H. Pylori, emphysema, former smoker (40+ ppd), osteopenia, menopause, HTN, hypothyroidism, and aortic atherosclerosis who presents for evaluation of sleep apnea.    Prior sleep studies:   No    Trouble falling asleep?: Yes  Trouble staying asleep?: No  Hypnotic use?:  Benadryl    Bed partner:    Yes, but now sleeping in separate rooms  Witnessed snoring ?:   Yes  Snoring arousals?:  Yes  Witnessed apneas?  Yes    Sleepy if inactive?:  Yes  ESS:    10    SLEEP SCHEDULE   Bed Time 9-10pm   Sleep Latency 2 hours   Arousals 2   Back to sleep Couple mins   Wake time 6am   Naps 2   Nocturia 2   Work Retired,        Younger brother has a CPAP. Older brother is getting one too. >30 years of snoring  Mallampati 4    Vitals:    09/05/23 1002   BP: (!) 140/76   BP Location: Left arm   Patient Position: Sitting   BP Method: Small (Automatic)   Pulse: 66   Weight: 66.7 kg (147 lb 0.8 oz)   Height: 5' 6" (1.676 m)       Physical Exam:    GEN:   Well-appearing  Psych:  Appropriate affect, demonstrates insight  SKIN:  No rash on the face or bridge of the nose      LABS:   Lab Results   Component Value Date    HGB 15.9 08/17/2023    CO2 27 08/17/2023       RECORDS REVIEWED PREVIOUSLY:    No prior sleep testing.    ASSESSMENT        9/2/2023     8:50 PM   EPWORTH SLEEPINESS SCALE   Sitting and reading 2   Watching TV 2   Sitting, inactive in a public place (e.g. a theatre or a meeting) 0   As a passenger in a car for an hour without a break 2   Lying down to rest in the afternoon when circumstances permit 3   Sitting and talking to someone 0   Sitting quietly after a lunch without alcohol 1   In a car, while stopped for a few minutes in traffic 0   Total score 10       Presenting Complaint:    PROBLEM DESCRIPTION/ Sx on Presentation  STATUS   suspected TERESA   + loud snoring,  sleeps separately,  + witnessed apneas   +TERESA " In brother and sister    New   Daytime Sx   + sleepiness when inactive   ESS 10/24 on intake  New   Insomnia     Trouble falling asleep: yes  Maintenance:         waking frequently   Prior hypnotics:        Current hypnotics: trazodone 50mg    New   Nocturia   x 2 per sleep period  New   Other issues: emphysema    PLAN     -recommend sleep testing   -discussed trial therapy if TERESA present and the patient is  open to a trial of CPAP therapy    RTC          The patient was given open opportunity to ask questions and/or express concerns about treatment plan.   All questions/concerns were discussed.     Two patient identifiers used prior to evaluation.

## 2023-09-06 ENCOUNTER — TELEPHONE (OUTPATIENT)
Dept: SLEEP MEDICINE | Facility: OTHER | Age: 63
End: 2023-09-06
Payer: MEDICARE

## 2023-09-07 ENCOUNTER — HOSPITAL ENCOUNTER (OUTPATIENT)
Dept: SLEEP MEDICINE | Facility: OTHER | Age: 63
Discharge: HOME | End: 2023-09-07
Attending: INTERNAL MEDICINE
Payer: MEDICARE

## 2023-09-07 DIAGNOSIS — I25.10 CORONARY ARTERY CALCIFICATION SEEN ON CT SCAN: ICD-10-CM

## 2023-09-07 DIAGNOSIS — G47.33 OSA (OBSTRUCTIVE SLEEP APNEA): ICD-10-CM

## 2023-09-07 DIAGNOSIS — F51.09 OTHER INSOMNIA NOT DUE TO A SUBSTANCE OR KNOWN PHYSIOLOGICAL CONDITION: ICD-10-CM

## 2023-09-07 DIAGNOSIS — I70.0 AORTIC ATHEROSCLEROSIS: ICD-10-CM

## 2023-09-07 DIAGNOSIS — G47.10 HYPERSOMNOLENCE: ICD-10-CM

## 2023-09-07 DIAGNOSIS — R35.1 NOCTURIA: ICD-10-CM

## 2023-09-07 PROCEDURE — 95800 SLP STDY UNATTENDED: CPT

## 2023-09-08 PROBLEM — I25.10 CORONARY ARTERY CALCIFICATION SEEN ON CT SCAN: Status: ACTIVE | Noted: 2023-09-08

## 2023-09-12 PROCEDURE — 95806 SLEEP STUDY UNATT&RESP EFFT: CPT | Mod: 26,,, | Performed by: INTERNAL MEDICINE

## 2023-09-12 PROCEDURE — 95806 PR SLEEP STUDY, UNATTENDED, SIMUL RECORD HR/O2 SAT/RESP FLOW/RESP EFFT: ICD-10-PCS | Mod: 26,,, | Performed by: INTERNAL MEDICINE

## 2023-09-13 ENCOUNTER — PATIENT MESSAGE (OUTPATIENT)
Dept: SLEEP MEDICINE | Facility: CLINIC | Age: 63
End: 2023-09-13
Payer: MEDICARE

## 2023-09-14 DIAGNOSIS — G47.33 OSA (OBSTRUCTIVE SLEEP APNEA): Primary | ICD-10-CM

## 2023-09-16 NOTE — PROGRESS NOTES
Tri your ultrasound shows no definitive cause of her epigastric pain slightly upper limits of normal of your liver with a little bit of fatty liver as your weight is normal I am not sure how much benefit losing weight will be for your fatty liver.  You have tiny gallstones versus tiny crystals I am not sure that is enough evidence to have your gallbladder removed.  Recommend avoiding alcohol or limiting alcohol as much as possible specially in somebody who has a normal weight.    Impression:     Mild hepatomegaly and hepatic steatosis.     Biliary crystal versus tiny gallstone.  No evidence of acute cholecystitis.        Electronically signed by: Nakul White MD  Date:                                            08/17/2023

## 2023-09-18 ENCOUNTER — LAB VISIT (OUTPATIENT)
Dept: LAB | Facility: OTHER | Age: 63
End: 2023-09-18
Attending: NURSE PRACTITIONER
Payer: MEDICARE

## 2023-09-18 DIAGNOSIS — R68.82 LOW LIBIDO: ICD-10-CM

## 2023-09-18 LAB — TESTOST SERPL-MCNC: 32 NG/DL (ref 5–73)

## 2023-09-18 PROCEDURE — 36415 COLL VENOUS BLD VENIPUNCTURE: CPT | Performed by: NURSE PRACTITIONER

## 2023-09-18 PROCEDURE — 84403 ASSAY OF TOTAL TESTOSTERONE: CPT | Performed by: NURSE PRACTITIONER

## 2023-09-18 PROCEDURE — 84402 ASSAY OF FREE TESTOSTERONE: CPT | Performed by: NURSE PRACTITIONER

## 2023-09-19 ENCOUNTER — ANESTHESIA EVENT (OUTPATIENT)
Dept: ENDOSCOPY | Facility: HOSPITAL | Age: 63
End: 2023-09-19
Payer: MEDICARE

## 2023-09-19 ENCOUNTER — HOSPITAL ENCOUNTER (OUTPATIENT)
Facility: HOSPITAL | Age: 63
Discharge: HOME | End: 2023-09-19
Attending: INTERNAL MEDICINE | Admitting: INTERNAL MEDICINE
Payer: MEDICARE

## 2023-09-19 ENCOUNTER — ANESTHESIA (OUTPATIENT)
Dept: ENDOSCOPY | Facility: HOSPITAL | Age: 63
End: 2023-09-19
Payer: MEDICARE

## 2023-09-19 VITALS
WEIGHT: 146 LBS | TEMPERATURE: 98 F | DIASTOLIC BLOOD PRESSURE: 70 MMHG | HEART RATE: 61 BPM | SYSTOLIC BLOOD PRESSURE: 136 MMHG | RESPIRATION RATE: 14 BRPM | OXYGEN SATURATION: 98 % | HEIGHT: 66 IN | BODY MASS INDEX: 23.46 KG/M2

## 2023-09-19 DIAGNOSIS — R59.1 LYMPHADENOPATHY: Primary | ICD-10-CM

## 2023-09-19 PROCEDURE — D9220A PRA ANESTHESIA: Mod: CRNA,,, | Performed by: NURSE ANESTHETIST, CERTIFIED REGISTERED

## 2023-09-19 PROCEDURE — D9220A PRA ANESTHESIA: ICD-10-PCS | Mod: CRNA,,, | Performed by: NURSE ANESTHETIST, CERTIFIED REGISTERED

## 2023-09-19 PROCEDURE — 25000003 PHARM REV CODE 250: Performed by: NURSE ANESTHETIST, CERTIFIED REGISTERED

## 2023-09-19 PROCEDURE — 25000003 PHARM REV CODE 250: Performed by: INTERNAL MEDICINE

## 2023-09-19 PROCEDURE — D9220A PRA ANESTHESIA: ICD-10-PCS | Mod: ANES,,, | Performed by: ANESTHESIOLOGY

## 2023-09-19 PROCEDURE — 37000009 HC ANESTHESIA EA ADD 15 MINS: Performed by: INTERNAL MEDICINE

## 2023-09-19 PROCEDURE — 37000008 HC ANESTHESIA 1ST 15 MINUTES: Performed by: INTERNAL MEDICINE

## 2023-09-19 PROCEDURE — 43237 ENDOSCOPIC US EXAM ESOPH: CPT | Mod: 52,,, | Performed by: INTERNAL MEDICINE

## 2023-09-19 PROCEDURE — 43237 PR ENDOSCOPIC US EXAM, ESOPH: ICD-10-PCS | Mod: 52,,, | Performed by: INTERNAL MEDICINE

## 2023-09-19 PROCEDURE — D9220A PRA ANESTHESIA: Mod: ANES,,, | Performed by: ANESTHESIOLOGY

## 2023-09-19 PROCEDURE — 63600175 PHARM REV CODE 636 W HCPCS: Performed by: NURSE ANESTHETIST, CERTIFIED REGISTERED

## 2023-09-19 PROCEDURE — 43237 ENDOSCOPIC US EXAM ESOPH: CPT | Mod: 74 | Performed by: INTERNAL MEDICINE

## 2023-09-19 RX ORDER — HYDROMORPHONE HYDROCHLORIDE 1 MG/ML
0.2 INJECTION, SOLUTION INTRAMUSCULAR; INTRAVENOUS; SUBCUTANEOUS EVERY 5 MIN PRN
Status: DISCONTINUED | OUTPATIENT
Start: 2023-09-19 | End: 2023-09-19 | Stop reason: HOSPADM

## 2023-09-19 RX ORDER — FENTANYL CITRATE 50 UG/ML
INJECTION, SOLUTION INTRAMUSCULAR; INTRAVENOUS
Status: DISCONTINUED | OUTPATIENT
Start: 2023-09-19 | End: 2023-09-19

## 2023-09-19 RX ORDER — SODIUM CHLORIDE 0.9 % (FLUSH) 0.9 %
3 SYRINGE (ML) INJECTION
Status: DISCONTINUED | OUTPATIENT
Start: 2023-09-19 | End: 2023-09-19 | Stop reason: HOSPADM

## 2023-09-19 RX ORDER — LIDOCAINE HYDROCHLORIDE 20 MG/ML
INJECTION INTRAVENOUS
Status: DISCONTINUED | OUTPATIENT
Start: 2023-09-19 | End: 2023-09-19

## 2023-09-19 RX ORDER — PROPOFOL 10 MG/ML
VIAL (ML) INTRAVENOUS
Status: DISCONTINUED | OUTPATIENT
Start: 2023-09-19 | End: 2023-09-19

## 2023-09-19 RX ORDER — SODIUM CHLORIDE 9 MG/ML
INJECTION, SOLUTION INTRAVENOUS CONTINUOUS
Status: DISCONTINUED | OUTPATIENT
Start: 2023-09-19 | End: 2023-09-19 | Stop reason: HOSPADM

## 2023-09-19 RX ORDER — DIPHENHYDRAMINE HYDROCHLORIDE 50 MG/ML
25 INJECTION INTRAMUSCULAR; INTRAVENOUS EVERY 6 HOURS PRN
Status: DISCONTINUED | OUTPATIENT
Start: 2023-09-19 | End: 2023-09-19 | Stop reason: HOSPADM

## 2023-09-19 RX ORDER — PROCHLORPERAZINE EDISYLATE 5 MG/ML
5 INJECTION INTRAMUSCULAR; INTRAVENOUS EVERY 30 MIN PRN
Status: DISCONTINUED | OUTPATIENT
Start: 2023-09-19 | End: 2023-09-19 | Stop reason: HOSPADM

## 2023-09-19 RX ORDER — SODIUM CHLORIDE 0.9 % (FLUSH) 0.9 %
10 SYRINGE (ML) INJECTION
Status: DISCONTINUED | OUTPATIENT
Start: 2023-09-19 | End: 2023-09-19 | Stop reason: HOSPADM

## 2023-09-19 RX ORDER — FENTANYL CITRATE 50 UG/ML
25 INJECTION, SOLUTION INTRAMUSCULAR; INTRAVENOUS EVERY 5 MIN PRN
Status: DISCONTINUED | OUTPATIENT
Start: 2023-09-19 | End: 2023-09-19 | Stop reason: HOSPADM

## 2023-09-19 RX ORDER — PROPOFOL 10 MG/ML
VIAL (ML) INTRAVENOUS CONTINUOUS PRN
Status: DISCONTINUED | OUTPATIENT
Start: 2023-09-19 | End: 2023-09-19

## 2023-09-19 RX ADMIN — FENTANYL CITRATE 25 MCG: 50 INJECTION, SOLUTION INTRAMUSCULAR; INTRAVENOUS at 01:09

## 2023-09-19 RX ADMIN — PROPOFOL 200 MCG/KG/MIN: 10 INJECTION, EMULSION INTRAVENOUS at 01:09

## 2023-09-19 RX ADMIN — PROPOFOL 50 MG: 10 INJECTION, EMULSION INTRAVENOUS at 01:09

## 2023-09-19 RX ADMIN — LIDOCAINE HYDROCHLORIDE 100 MG: 20 INJECTION INTRAVENOUS at 01:09

## 2023-09-19 RX ADMIN — GLYCOPYRROLATE 0.1 MG: 0.2 INJECTION, SOLUTION INTRAMUSCULAR; INTRAVENOUS at 01:09

## 2023-09-19 RX ADMIN — SODIUM CHLORIDE: 0.9 INJECTION, SOLUTION INTRAVENOUS at 12:09

## 2023-09-19 NOTE — ANESTHESIA PREPROCEDURE EVALUATION
09/19/2023  Pre-operative evaluation for Procedure(s) (LRB):  ULTRASOUND, UPPER GI TRACT, ENDOSCOPIC (N/A)    Nora Burger is a 63 y.o. female     Patient Active Problem List   Diagnosis    Hypothyroidism (acquired)    PVC (premature ventricular contraction)    Primary hypertension    Cardiac murmur    Osteopenia of neck of femur    Esophageal stricture    Pelvic floor dysfunction    Coordination impairment    Chronic left hip pain    Coronary artery calcification seen on CT scan       Review of patient's allergies indicates:  No Known Allergies    No current facility-administered medications on file prior to encounter.     Current Outpatient Medications on File Prior to Encounter   Medication Sig Dispense Refill    aspirin (ECOTRIN) 81 MG EC tablet Take 81 mg by mouth once daily.      cholecalciferol, vitamin D3, (VITAMIN D3) 50 mcg (2,000 unit) Cap capsule Take 2 capsules (4,000 Units total) by mouth once daily. 180 capsule 3    diltiaZEM (CARDIZEM CD) 240 MG 24 hr capsule Take 1 capsule (240 mg total) by mouth once daily. 90 capsule 3    ergocalciferol (VITAMIN D2) 50,000 unit Cap Take 1 capsule (50,000 Units total) by mouth every 7 days. 12 capsule 3    estradiol 0.05 mg/24 hr td ptsw (VIVELLE-DOT) 0.05 mg/24 hr Place 1 patch onto the skin twice a week. 8 patch 11    ezetimibe (ZETIA) 10 mg tablet Take 1 tablet (10 mg total) by mouth once daily. 90 tablet 3    hydroCHLOROthiazide (HYDRODIURIL) 25 MG tablet Take 1 tablet (25 mg total) by mouth once daily. 90 tablet 3    levothyroxine (SYNTHROID) 50 MCG tablet Take 1 tablet (50 mcg total) by mouth before breakfast. 90 tablet 3    losartan (COZAAR) 50 MG tablet Take 1 tablet (50 mg total) by mouth once daily. 90 tablet 3    omeprazole (PRILOSEC) 20 MG capsule Take 1 capsule (20 mg total) by mouth every 12 (twelve) hours. H pylori  treatment for 14 days 28 capsule 0    progesterone (PROMETRIUM) 100 MG capsule Take 1 capsule (100 mg total) by mouth nightly. 30 capsule 11    sodium,potassium,mag sulfates (SUPREP BOWEL PREP KIT) 17.5-3.13-1.6 gram SolR Take by mouth.         Past Surgical History:   Procedure Laterality Date    AUGMENTATION OF BREAST Bilateral     approx 6 years [facility]    COLONOSCOPY N/A 2023    Procedure: COLONOSCOPY;  Surgeon: Charles Shah MD;  Location[facility] (4TH FLR);  Service: Endoscopy;  Laterality: N/A;  from referral from Dr. Noah Monroy/ prep ins on portal - suprep per Pt request (Pt has clinic appt on 23 w/ DR. Shah, Pt may have EGD added, hold placed to follow colon on this date if needed) - ERW  prep instructions given to pt in clinic -   - Precall done.    ESOPHAGOGASTRODUODENOSCOPY N/A 2023    Procedure: EGD (ESOPHAGOGASTRODUODENOSCOPY);  Surgeon: Charles Shah MD;  Location: [facility] ([address] [place] [address]);  Service: Endoscopy;  Laterality: N/A;    HYSTERECTOMY      age mid 40's or 50's [facility].    OOPHORECTOMY         Social History     Socioeconomic History    Marital status:    Tobacco Use    Smoking status: Former     Current packs/day: 0.00     Average packs/day: 1 pack/day for 49.0 years (49.0 ttl pk-yrs)     Types: Cigarettes     Start date: 1973     Quit date: 2022     Years since quittin.3   Substance and Sexual Activity    Alcohol use: Yes     Alcohol/week: 1.0 standard drink of alcohol     Types: 1 Cans of beer per week     Comment: occ    Drug use: Not Currently    Sexual activity: Yes     Partners: Male     Birth control/protection: See Surgical Hx     Comment:              Pre-op Assessment    I have reviewed the Patient Summary Reports.     I have reviewed the Nursing Notes. I have reviewed the NPO Status.      Review of Systems  Anesthesia Hx:  No problems with previous Anesthesia    Cardiovascular:   Hypertension CAD       Hepatic/GI:   GERD    Neurological:   Neuromuscular Disease,    Endocrine:   Hypothyroidism        Physical Exam    Airway:  Mouth Opening: Normal  Tongue: Normal    Chest/Lungs:  Normal Respiratory Rate    Heart:  Rhythm: Regular Rhythm        Anesthesia Plan  Type of Anesthesia, risks & benefits discussed:    Anesthesia Type: Gen Natural Airway  Intra-op Monitoring Plan: Standard ASA Monitors  Induction:  IV  Informed Consent: Informed consent signed with the Patient and all parties understand the risks and agree with anesthesia plan.  All questions answered.   ASA Score: 2    Ready For Surgery From Anesthesia Perspective.     .

## 2023-09-19 NOTE — TRANSFER OF CARE
"Anesthesia Transfer of Care Note    Patient: Nora Burger    Procedure(s) Performed: Procedure(s) (LRB):  ULTRASOUND, UPPER GI TRACT, ENDOSCOPIC (N/A)    Patient location: [facility]    Anesthesia Type: general    Transport from OR: Transported from OR on room air with adequate spontaneous ventilation    Post pain: adequate analgesia    Post assessment: no apparent anesthetic complications and tolerated procedure well    Post vital signs: stable    Level of consciousness: awake, alert and oriented    Nausea/Vomiting: no nausea/vomiting    Complications: none    Transfer of care protocol was followed      Last vitals:   Visit Vitals  /64   Pulse 74   Temp 36.7 °C (98.1 °F) (Temporal)   Resp 16   Ht 5' 6" (1.676 m)   Wt 66.2 kg (146 lb)   SpO2 98%   Breastfeeding No   BMI 23.57 kg/m²     "

## 2023-09-19 NOTE — H&P
History & Physical - Short Stay  Gastroenterology      SUBJECTIVE:     Procedure: EUS    Chief Complaint/Indication for Procedure: Abnormal imaging    History of Present Illness:  Patient is a 63 y.o. female presents with abnormal US showing enlarge peripancreatic lymph nodes.   Facility-Administered Medications Prior to Admission   Medication    testosterone cypionate injection 50 mg     PTA Medications   Medication Sig    aspirin (ECOTRIN) 81 MG EC tablet Take 81 mg by mouth once daily.    cholecalciferol, vitamin D3, (VITAMIN D3) 50 mcg (2,000 unit) Cap capsule Take 2 capsules (4,000 Units total) by mouth once daily.    diltiaZEM (CARDIZEM CD) 240 MG 24 hr capsule Take 1 capsule (240 mg total) by mouth once daily.    ergocalciferol (VITAMIN D2) 50,000 unit Cap Take 1 capsule (50,000 Units total) by mouth every 7 days.    estradiol 0.05 mg/24 hr td ptsw (VIVELLE-DOT) 0.05 mg/24 hr Place 1 patch onto the skin twice a week.    ezetimibe (ZETIA) 10 mg tablet Take 1 tablet (10 mg total) by mouth once daily.    hydroCHLOROthiazide (HYDRODIURIL) 25 MG tablet Take 1 tablet (25 mg total) by mouth once daily.    levothyroxine (SYNTHROID) 50 MCG tablet Take 1 tablet (50 mcg total) by mouth before breakfast.    losartan (COZAAR) 50 MG tablet Take 1 tablet (50 mg total) by mouth once daily.    omeprazole (PRILOSEC) 20 MG capsule Take 1 capsule (20 mg total) by mouth every 12 (twelve) hours. H pylori treatment for 14 days    progesterone (PROMETRIUM) 100 MG capsule Take 1 capsule (100 mg total) by mouth nightly.    sodium,potassium,mag sulfates (SUPREP BOWEL PREP KIT) 17.5-3.13-1.6 gram SolR Take by mouth.       Review of patient's allergies indicates:  No Known Allergies     Past Medical History:   Diagnosis Date    GERD (gastroesophageal reflux disease)     Hypertension     Hypothyroidism, unspecified      Past Surgical History:   Procedure Laterality Date    AUGMENTATION OF BREAST Bilateral     approx 6 years [facility]     "COLONOSCOPY N/A 2023    Procedure: COLONOSCOPY;  Surgeon: Charles Shah MD;  Location: [facility] (4TH FLR);  Service: Endoscopy;  Laterality: N/A;  from referral from Dr. Noah Monroy/ prep ins on portal - suprep per Pt request (Pt has clinic appt on 23 w/ DR. Shah, Pt may have EGD added, hold placed to follow colon on this date if needed) - ERW  prep instructions given to pt in clinic -   - Precall done.    ESOPHAGOGASTRODUODENOSCOPY N/A 2023    Procedure: EGD (ESOPHAGOGASTRODUODENOSCOPY);  Surgeon: Charles Shah MD;  [facility]  Service: Endoscopy;  Laterality: N/A;    HYSTERECTOMY      age mid 40's or 50's DUB, endo.    OOPHORECTOMY       Family History   Problem Relation Age of Onset    Colon cancer Paternal Grandfather     Breast cancer Neg Hx     Ovarian cancer Neg Hx     Uterine cancer Neg Hx     Cervical cancer Neg Hx      Social History     Tobacco Use    Smoking status: Former     Current packs/day: 0.00     Average packs/day: 1 pack/day for 49.0 years (49.0 ttl pk-yrs)     Types: Cigarettes     Start date: 1973     Quit date: 2022     Years since quittin.3   Substance Use Topics    Alcohol use: Yes     Alcohol/week: 1.0 standard drink of alcohol     Types: 1 Cans of beer per week     Comment: occ    Drug use: Not Currently       Review of Systems:  Constitutional: no fever or chills  Respiratory: no cough or shortness of breath  Cardiovascular: no chest pain or palpitations    OBJECTIVE:     Vital Signs (Most Recent)       Physical Exam:  General: well developed, well nourished  Lungs:  normal respiratory effort  Heart: regular rate, S1, S2 normal    Laboratory  CBC: No results for input(s): "WBC", "RBC", "HGB", "HCT", "PLT", "MCV", "MCH", "MCHC" in the last 168 hours.  CMP: No results for input(s): "GLU", "CALCIUM", "ALBUMIN", "PROT", "NA", "K", "CO2", "CL", "BUN", "CREATININE", "ALKPHOS", "ALT", "AST", "BILITOT" in the last 168 " "hours.  Coagulation: No results for input(s): "LABPROT", "INR", "APTT" in the last 168 hours.    Diagnostic Results:      ASSESSMENT/PLAN:       Abnormal peripancreatic lymph nodes on imaging    Plan: EUS    Anesthesia Plan: MAC    ASA Grade: ASA 2 - Patient with mild systemic disease with no functional limitations     The impression and plan was discussed in detail with the patient. All questions have been answered and the patient voices understanding of our plan at this point. The risk of the procedure was discussed in detail which includes but not limited to bleeding, infection, perforation in some cases requiring surgery with its spectrum of complications.    "

## 2023-09-19 NOTE — PROVATION PATIENT INSTRUCTIONS
Discharge Summary/Instructions after an Endoscopic Procedure  Patient Name: Nora Burger  Patient MRN: [id number]  Patient YOB: 1960 Tuesday, September 19, 2023  Simone Mckee MD  Dear patient,  As a result of recent federal legislation (The Federal Cures Act), you may   receive lab or pathology results from your procedure in your [facility]   account before your physician is able to contact you. Your physician or   their representative will relay the results to you with their   recommendations at their soonest availability.  Thank you,  RESTRICTIONS:  During your procedure today, you received medications for sedation.  These   medications may affect your judgment, balance and coordination.  Therefore,   for 24 hours, you have the following restrictions:   - DO NOT drive a car, operate machinery, make legal/financial decisions,   sign important papers or drink alcohol.    ACTIVITY:  Today: no heavy lifting, straining or running due to procedural   sedation/anesthesia.  The following day: return to full activity including work.  DIET:  Eat and drink normally unless instructed otherwise.     TREATMENT FOR COMMON SIDE EFFECTS:  - Mild abdominal pain, nausea, belching, bloating or excessive gas:  rest,   eat lightly and use a heating pad.  - Sore Throat: treat with throat lozenges and/or gargle with warm salt   water.  - Because air was used during the procedure, expelling large amounts of air   from your rectum or belching is normal.  - If a bowel prep was taken, you may not have a bowel movement for 1-3 days.    This is normal.  SYMPTOMS TO WATCH FOR AND REPORT TO YOUR PHYSICIAN:  1. Abdominal pain or bloating, other than gas cramps.  2. Chest pain.  3. Back pain.  4. Signs of infection such as: chills or fever occurring within 24 hours   after the procedure.  5. Rectal bleeding, which would show as bright red, maroon, or black stools.   (A tablespoon of blood from the rectum is not serious,  especially if   hemorrhoids are present.)  6. Vomiting.  7. Weakness or dizziness.  GO DIRECTLY TO THE NEAREST EMERGENCY ROOM IF YOU HAVE ANY OF THE FOLLOWING:      Difficulty breathing              Chills and/or fever over 101 F   Persistent vomiting and/or vomiting blood   Severe abdominal pain   Severe chest pain   Black, tarry stools   Bleeding- more than one tablespoon   Any other symptom or condition that you feel may need urgent attention  Your doctor recommends these additional instructions:  If any biopsies were taken, your doctors clinic will contact you in 1 to 2   weeks with any results.  - Discharge patient to home (ambulatory).   - Perform CT scan (computed tomography) of the abdomen with contrast at   appointment to be scheduled.  For questions, problems or results please call your physician - Simone Mckee MD at Work:  [phone].  [facility], EMERGENCY ROOM PHONE NUMBER: [phone]  IF A COMPLICATION OR EMERGENCY SITUATION ARISES AND YOU ARE UNABLE TO REACH   YOUR PHYSICIAN - GO DIRECTLY TO THE EMERGENCY ROOM.  Simone Mckee MD  9/19/2023 2:08:02 PM  This report has been verified and signed electronically.  Dear patient,  As a result of recent federal legislation (The Federal Cures Act), you may   receive lab or pathology results from your procedure in your [facility]   account before your physician is able to contact you. Your physician or   their representative will relay the results to you with their   recommendations at their soonest availability.  Thank you,  PROVATION

## 2023-09-19 NOTE — BRIEF OP NOTE
Discharge Summary/Instructions after an Endoscopic Procedure    Patient Name: Nora Burger  Patient MRN: [id number]  Patient YOB: 1960 Tuesday, September 19, 2023  Simone Mckee MD    Dear patient,  As a result of recent federal legislation (The Federal Cures Act), you may receive lab or pathology results from your procedure in your [facility] account before your physician is able to contact you. Your physician or their representative will relay the results to you with their recommendations at their soonest availability.  Thank you,    RESTRICTIONS:  During your procedure today, you received medications for sedation.  These medications may affect your judgment, balance and coordination.  Therefore, for 24 hours, you have the following restrictions:     - DO NOT drive a car, operate machinery, make legal/financial decisions, sign important papers or drink alcohol.      ACTIVITY:  Today: no heavy lifting, straining or running due to procedural sedation/anesthesia.  The following day: return to full activity including work.    DIET:  Eat and drink normally unless instructed otherwise.     TREATMENT FOR COMMON SIDE EFFECTS:  - Mild abdominal pain, nausea, belching, bloating or excessive gas:  rest, eat lightly and use a heating pad.  - Sore Throat: treat with throat lozenges and/or gargle with warm salt water.  - Because air was used during the procedure, expelling large amounts of air from your rectum or belching is normal.  - If a bowel prep was taken, you may not have a bowel movement for 1-3 days.  This is normal.      SYMPTOMS TO WATCH FOR AND REPORT TO YOUR PHYSICIAN:  1. Abdominal pain or bloating, other than gas cramps.  2. Chest pain.  3. Back pain.  4. Signs of infection such as: chills or fever occurring within 24 hours after the procedure.  5. Rectal bleeding, which would show as bright red, maroon, or black stools. (A tablespoon of blood from the rectum is not serious, especially if  hemorrhoids are present.)  6. Vomiting.  7. Weakness or dizziness.      GO DIRECTLY TO THE NEAREST EMERGENCY ROOM IF YOU HAVE ANY OF THE FOLLOWING:     Difficulty breathing              Chills and/or fever over 101 F   Persistent vomiting and/or vomiting blood   Severe abdominal pain   Severe chest pain   Black, tarry stools   Bleeding- more than one tablespoon   Any other symptom or condition that you feel may need urgent attention    Your doctor recommends these additional instructions:  If any biopsies were taken, your doctors clinic will contact you in 1 to 2 weeks with any results.    - Discharge patient to home (ambulatory).   - Perform CT scan (computed tomography) of the abdomen with contrast at appointment to be scheduled.    For questions, problems or results please call your physician - Simone Mckee MD at Work:  [phone].    [facility], EMERGENCY ROOM PHONE NUMBER: [phone]    IF A COMPLICATION OR EMERGENCY SITUATION ARISES AND YOU ARE UNABLE TO REACH YOUR PHYSICIAN - GO DIRECTLY TO THE EMERGENCY ROOM.

## 2023-09-20 ENCOUNTER — TELEPHONE (OUTPATIENT)
Dept: ENDOSCOPY | Facility: HOSPITAL | Age: 63
End: 2023-09-20
Payer: MEDICARE

## 2023-09-20 DIAGNOSIS — R93.89 ABNORMAL FINDING ON IMAGING: Primary | ICD-10-CM

## 2023-09-20 LAB — TESTOST FREE SERPL-MCNC: 0.7 PG/ML

## 2023-09-20 NOTE — ANESTHESIA POSTPROCEDURE EVALUATION
Anesthesia Post Evaluation    Patient: Nora Burger    Procedure(s) Performed: Procedure(s) (LRB):  ULTRASOUND, UPPER GI TRACT, ENDOSCOPIC (N/A)    Final Anesthesia Type: general      Patient location during evaluation: PACU  Patient participation: Yes- Able to Participate  Level of consciousness: awake and alert  Post-procedure vital signs: reviewed and stable  Pain management: adequate  Airway patency: patent    PONV status at discharge: No PONV  Anesthetic complications: no      Cardiovascular status: blood pressure returned to baseline  Respiratory status: unassisted  Follow-up not needed.          Vitals Value Taken Time   /70 09/19/23 1417   Temp 36.7 °C (98 °F) 09/19/23 1430   Pulse 60 09/19/23 1430   Resp 14 09/19/23 1430   SpO2 98 % 09/19/23 1430   Vitals shown include unvalidated device data.      No case tracking events are documented in the log.      Pain/Mila Score: Mila Score: 10 (9/19/2023  2:15 PM)

## 2023-09-25 ENCOUNTER — OFFICE VISIT (OUTPATIENT)
Dept: OBSTETRICS AND GYNECOLOGY | Facility: CLINIC | Age: 63
End: 2023-09-25
Payer: MEDICARE

## 2023-09-25 VITALS
HEART RATE: 68 BPM | BODY MASS INDEX: 23.95 KG/M2 | DIASTOLIC BLOOD PRESSURE: 76 MMHG | HEIGHT: 66 IN | SYSTOLIC BLOOD PRESSURE: 131 MMHG | WEIGHT: 149 LBS

## 2023-09-25 DIAGNOSIS — N95.1 MENOPAUSAL SYMPTOMS: Primary | ICD-10-CM

## 2023-09-25 PROCEDURE — 99213 OFFICE O/P EST LOW 20 MIN: CPT | Mod: PBBFAC | Performed by: NURSE PRACTITIONER

## 2023-09-25 PROCEDURE — 99999 PR PBB SHADOW E&M-EST. PATIENT-LVL III: CPT | Mod: PBBFAC,,, | Performed by: NURSE PRACTITIONER

## 2023-09-25 PROCEDURE — 99999 PR PBB SHADOW E&M-EST. PATIENT-LVL III: ICD-10-PCS | Mod: PBBFAC,,, | Performed by: NURSE PRACTITIONER

## 2023-09-25 PROCEDURE — 99213 PR OFFICE/OUTPT VISIT, EST, LEVL III, 20-29 MIN: ICD-10-PCS | Mod: S$PBB,,, | Performed by: NURSE PRACTITIONER

## 2023-09-25 PROCEDURE — 99213 OFFICE O/P EST LOW 20 MIN: CPT | Mod: S$PBB,,, | Performed by: NURSE PRACTITIONER

## 2023-09-25 NOTE — PROGRESS NOTES
Subjective:      Nora Burger is a 63 y.o. female who is here for follow-up of hormone replacement therapy. Current use of Vivelle Dot 0.05 mg/ 24 hr TD, has discontinued Testosterone injections and Prometrium. Joint pain has improved. Recent new onset RUQ abdominal pain post endoscopy, noted 2.2 cm lymph node near pancreas. Scheduled for CT ABD for further assessment of site per GI.     Desires to take break from HRT with current reported symptoms.     Lab Visit on 09/18/2023   Component Date Value Ref Range Status    Testosterone, Free 09/18/2023 0.7  pg/mL Final    Testosterone, Total 09/18/2023 32  5 - 73 ng/dL Final   Lab Visit on 08/17/2023   Component Date Value Ref Range Status    Sodium 08/17/2023 139  136 - 145 mmol/L Final    Potassium 08/17/2023 4.1  3.5 - 5.1 mmol/L Final    Chloride 08/17/2023 104  95 - 110 mmol/L Final    CO2 08/17/2023 27  23 - 29 mmol/L Final    Glucose 08/17/2023 91  70 - 110 mg/dL Final    BUN 08/17/2023 17  8 - 23 mg/dL Final    Creatinine 08/17/2023 0.9  0.5 - 1.4 mg/dL Final    Calcium 08/17/2023 9.4  8.7 - 10.5 mg/dL Final    Total Protein 08/17/2023 7.4  6.0 - 8.4 g/dL Final    Albumin 08/17/2023 4.3  3.5 - 5.2 g/dL Final    Total Bilirubin 08/17/2023 0.6  0.1 - 1.0 mg/dL Final    Alkaline Phosphatase 08/17/2023 63  55 - 135 U/L Final    AST 08/17/2023 15  10 - 40 U/L Final    ALT 08/17/2023 10  10 - 44 U/L Final    eGFR 08/17/2023 >60.0  >60 mL/min/1.73 m^2 Final    Anion Gap 08/17/2023 8  8 - 16 mmol/L Final    WBC 08/17/2023 6.62  3.90 - 12.70 K/uL Final    RBC 08/17/2023 5.34  4.00 - 5.40 M/uL Final    Hemoglobin 08/17/2023 15.9  12.0 - 16.0 g/dL Final    Hematocrit 08/17/2023 48.1  37.0 - 48.5 % Final    MCV 08/17/2023 90  82 - 98 fL Final    MCH 08/17/2023 29.8  27.0 - 31.0 pg Final    MCHC 08/17/2023 33.1  32.0 - 36.0 g/dL Final    RDW 08/17/2023 13.0  11.5 - 14.5 % Final    Platelets 08/17/2023 264  150 - 450 K/uL Final    MPV 08/17/2023 10.3  9.2 - 12.9 fL Final     Immature Granulocytes 08/17/2023 0.3  0.0 - 0.5 % Final    Gran # (ANC) 08/17/2023 4.1  1.8 - 7.7 K/uL Final    Immature Grans (Abs) 08/17/2023 0.02  0.00 - 0.04 K/uL Final    Lymph # 08/17/2023 1.8  1.0 - 4.8 K/uL Final    Mono # 08/17/2023 0.5  0.3 - 1.0 K/uL Final    Eos # 08/17/2023 0.1  0.0 - 0.5 K/uL Final    Baso # 08/17/2023 0.04  0.00 - 0.20 K/uL Final    nRBC 08/17/2023 0  0 /100 WBC Final    Gran % 08/17/2023 62.1  38.0 - 73.0 % Final    Lymph % 08/17/2023 27.8  18.0 - 48.0 % Final    Mono % 08/17/2023 7.7  4.0 - 15.0 % Final    Eosinophil % 08/17/2023 1.5  0.0 - 8.0 % Final    Basophil % 08/17/2023 0.6  0.0 - 1.9 % Final    Differential Method 08/17/2023 Automated   Final    TTG IgA 08/17/2023 0.2  <7.0 U/mL Final    IgA 08/17/2023 147  40 - 350 mg/dL Final    Lipase 08/17/2023 17  4 - 60 U/L Final   Admission on 07/28/2023, Discharged on 07/28/2023   Component Date Value Ref Range Status    Final Pathologic Diagnosis 07/28/2023    Final                    Value:1. 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 Colon, cecal polypectomy, biopsy:  - Benign polypoid piece of colonic mucosa with prominent lymphoid aggregate  - Negative for dysplasia or carcinoma on multiple examined H&E levels      Microscopic Exam 07/28/2023    Final                    Value:1. Immunohistochemistry for Helicobacter pylori, performed with appropriate and reactive controls, highlights sparse organisms overlying the surface epithelium and within gastric pits, in keeping with the above diagnosis.    2. Microscopic examination is performed and the results are incorporated into the above findings.      Gross 07/28/2023    Final                    Value:Pathology ID:  [id number]  Patient ID:  [id number]  Received in 2 parts  Part 1:  Pathology ID:  [id number]  Patient ID:  [id number]  The specimen is received in  formalin labeled &quot;stomach&quot;.  The specimen consists of 4 tan fragments of soft tissue measuring 0.8 x 0.2 x 0.1 cm in aggregate.  The specimen is submitted entirely in cassette [id number].    Part 2:  Pathology ID:  [id number]  Patient ID:  [id number]  The specimen is received in formalin labeled &quot;cecal polyp&quot;.  The specimen consists of 5 tan fragments of soft tissue measuring 0.7 x 0.3 x 0.1 cm in aggregate.  The specimen is submitted entirely in cassette [id number].    Patric Murphy      Disclaimer 07/28/2023 Unless the case is a 'gross only' or additional testing only, the final diagnosis for each specimen is based on a microscopic examination of appropriate tissue sections.   Final   Lab Visit on 07/17/2023   Component Date Value Ref Range Status    Sodium 07/17/2023 139  136 - 145 mmol/L Final    Potassium 07/17/2023 4.0  3.5 - 5.1 mmol/L Final    Chloride 07/17/2023 104  95 - 110 mmol/L Final    CO2 07/17/2023 26  23 - 29 mmol/L Final    Glucose 07/17/2023 90  70 - 110 mg/dL Final    BUN 07/17/2023 15  8 - 23 mg/dL Final    Creatinine 07/17/2023 0.8  0.5 - 1.4 mg/dL Final    Calcium 07/17/2023 9.4  8.7 - 10.5 mg/dL Final    Total Protein 07/17/2023 7.2  6.0 - 8.4 g/dL Final    Albumin 07/17/2023 4.3  3.5 - 5.2 g/dL Final    Total Bilirubin 07/17/2023 0.7  0.1 - 1.0 mg/dL Final    Alkaline Phosphatase 07/17/2023 58  55 - 135 U/L Final    AST 07/17/2023 15  10 - 40 U/L Final    ALT 07/17/2023 12  10 - 44 U/L Final    eGFR 07/17/2023 >60.0  >60 mL/min/1.73 m^2 Final    Anion Gap 07/17/2023 9  8 - 16 mmol/L Final    Vit D, 25-Hydroxy 07/17/2023 25 (L)  30 - 96 ng/mL Final    Phosphorus 07/17/2023 2.6 (L)  2.7 - 4.5 mg/dL Final    Ionized Calcium 07/17/2023 1.20  1.06 - 1.42 mmol/L Final    Magnesium 07/17/2023 1.9  1.6 - 2.6 mg/dL Final   Hospital Outpatient Visit on 07/06/2023   Component Date Value Ref Range Status    Ascending aorta 07/06/2023 2.44  cm Final    STJ 07/06/2023 2.18  cm  "Final    IVRT 07/06/2023 97.05  msec Final    IVS 07/06/2023 0.60  0.6 - 1.1 cm Final    LA size 07/06/2023 3.20  cm Final    Left Atrium Major Axis 07/06/2023 5.54  cm Final    Left Atrium Minor Axis 07/06/2023 5.54  cm Final    LVIDd 07/06/2023 4.10  3.5 - 6.0 cm Final    LVIDs 07/06/2023 3.00  2.1 - 4.0 cm Final    LVOT diameter 07/06/2023 2.07  cm Final    LVOT peak VTI 07/06/2023 28.12  cm Final    Posterior Wall 07/06/2023 0.86  0.6 - 1.1 cm Final    MV Peak A Luis Daniel 07/06/2023 0.87  m/s Final    E wave deceleration time 07/06/2023 243.36  msec Final    MV Peak E Luis Daniel 07/06/2023 0.85  m/s Final    PV Peak D Luis Daniel 07/06/2023 0.37  m/s Final    PV Peak S Luis Daniel 07/06/2023 0.63  m/s Final    RA Major Axis 07/06/2023 5.07  cm Final    RA Width 07/06/2023 3.70  cm Final    RVDD 07/06/2023 3.59  cm Final    Sinus 07/06/2023 2.81  cm Final    TAPSE 07/06/2023 2.59  cm Final    TR Max Luis Daniel 07/06/2023 2.36  m/s Final    LA WIDTH 07/06/2023 3.64  cm Final    MV stenosis pressure 1/2 time 07/06/2023 70.58  ms Final    LV Diastolic Volume 07/06/2023 74.22  mL Final    LV Systolic Volume 07/06/2023 35.10  mL Final    LVOT peak luis daniel 07/06/2023 1.35  m/s Final    TDI LATERAL 07/06/2023 0.13  m/s Final    TDI SEPTAL 07/06/2023 0.11  m/s Final    LA volume (mod) 07/06/2023 43.41  cm3 Final    MV "A" wave duration 07/06/2023 7.99  msec Final    LV LATERAL E/E' RATIO 07/06/2023 6.54  m/s Final    LV SEPTAL E/E' RATIO 07/06/2023 7.73  m/s Final    FS 07/06/2023 27  % Final    LA volume 07/06/2023 54.85  cm3 Final    LV mass 07/06/2023 86.26  g Final    Left Ventricle Relative Wall Thick* 07/06/2023 0.42  cm Final    MV valve area p 1/2 method 07/06/2023 3.12  cm2 Final    E/A ratio 07/06/2023 0.98   Final    Mean e' 07/06/2023 0.12  m/s Final    Pulm vein S/D ratio 07/06/2023 1.70   Final    LVOT area 07/06/2023 3.4  cm2 Final    LVOT stroke volume 07/06/2023 94.59  cm3 Final    E/E' ratio 07/06/2023 7.08  m/s Final    Triscuspid Valve " Regurgitation Pea* 07/06/2023 22  mmHg Final    BSA 07/06/2023 1.77  m2 Final    Systolic blood pressure 07/06/2023 139  mmHg Final    Diastolic blood pressure 07/06/2023 76  mmHg Final    HR at rest 07/06/2023 62  bpm Final    RPP 07/06/2023 8,618   Final    Peak HR 07/06/2023 133  bpm Final    Peak Systolic BP 07/06/2023 182  mmHg Final    Peak Diatolic BP 07/06/2023 72  mmHg Final    Peak RPP 07/06/2023 24,206   Final    Estimated METs 07/06/2023 12   Final    Max Predicted HR 07/06/2023 149   Final    85% Max Predicted HR 07/06/2023 126   Final    % Max HR Achieved 07/06/2023 89   Final    1 Minute Recovery HR 07/06/2023 93  bpm Final    [facility]HS CV CPX PATIENT IS MALE 07/06/2023 0   Final    [facility] CV CPX PATIENT IS FEMALE 07/06/2023 1   Final    Exercise duration (sec) 07/06/2023 30  seconds Final    Exercise duration (min) 07/06/2023 7  minutes Final    LV Systolic Volume Index 07/06/2023 19.9  mL/m2 Final    LV Diastolic Volume Index 07/06/2023 42.17  mL/m2 Final    LA Volume Index 07/06/2023 31.2  mL/m2 Final    LV Mass Index 07/06/2023 49  g/m2 Final    LA Volume Index (Mod) 07/06/2023 24.7  mL/m2 Final    Right Atrial Pressure (from IVC) 07/06/2023 3  mmHg Final    EF 07/06/2023 65  % Final    TV resting pulmonary artery pressu* 07/06/2023 25  mmHg Final       Past Medical History:   Diagnosis Date    Abnormal Pap smear of cervix     GERD (gastroesophageal reflux disease)     Hypertension     Hypothyroidism, unspecified      Past Surgical History:   Procedure Laterality Date    AUGMENTATION OF BREAST Bilateral     approx 6 years [facility]one    CERVICAL BIOPSY  W/ LOOP ELECTRODE EXCISION      COLONOSCOPY N/A 07/28/2023    Procedure: COLONOSCOPY;  Surgeon: Charles Shah MD;  Location: [facility] ([address] [place] [address]);  Service: Endoscopy;  Laterality: N/A;  from referral from Dr. Noah Monroy/ prep ins on portal - suprep per Pt request (Pt has clinic appt on 7/17/23 w/ DR. Shah, Pt may have EGD added, hold placed to follow  colon on this date if needed) - ERW  prep instructions given to pt in clinic -Dennis   - Precall done.    ENDOSCOPIC ULTRASOUND OF UPPER GASTROINTESTINAL TRACT N/A 2023    Procedure: ULTRASOUND, UPPER GI TRACT, ENDOSCOPIC;  Surgeon: Simone Mckee MD;  Location: [facility] ENDO (2ND FLR);  Service: Endoscopy;  Laterality: N/A;  instr portal-tb  - number not working. unable to contact for precall- KL    ESOPHAGOGASTRODUODENOSCOPY N/A 2023    Procedure: EGD (ESOPHAGOGASTRODUODENOSCOPY);  Surgeon: Charles Shah MD;  Location: [facility] ENDO (4TH FLR);  Service: Endoscopy;  Laterality: N/A;    HYSTERECTOMY      age mid 40's or 50's DUB, endo.    OOPHORECTOMY       Social History     Tobacco Use    Smoking status: Former     Current packs/day: 0.00     Average packs/day: 1 pack/day for 49.0 years (49.0 ttl pk-yrs)     Types: Cigarettes     Start date: 1973     Quit date: 2022     Years since quittin.3   Substance Use Topics    Alcohol use: Yes     Alcohol/week: 1.0 standard drink of alcohol     Types: 1 Cans of beer per week     Comment: occ    Drug use: Not Currently     Family History   Problem Relation Age of Onset    Colon cancer Paternal Grandfather     Cancer Paternal Aunt         lymphoma    Breast cancer Neg Hx     Ovarian cancer Neg Hx     Uterine cancer Neg Hx     Cervical cancer Neg Hx      OB History    Para Term  AB Living   3 2 2   1     SAB IAB Ectopic Multiple Live Births   1              # Outcome Date GA Lbr Eitan/2nd Weight Sex Delivery Anes PTL Lv   3 SAB            2 Term      CS-LTranv      1 Term      CS-LTranv          Current Outpatient Medications:     aspirin (ECOTRIN) 81 MG EC tablet, Take 81 mg by mouth once daily., Disp: , Rfl:     cholecalciferol, vitamin D3, (VITAMIN D3) 50 mcg (2,000 unit) Cap capsule, Take 2 capsules (4,000 Units total) by mouth once daily., Disp: 180 capsule, Rfl: 3    diltiaZEM (CARDIZEM CD) 240 MG 24 hr capsule, Take 1 capsule  "(240 mg total) by mouth once daily., Disp: 90 capsule, Rfl: 3    ergocalciferol (VITAMIN D2) 50,000 unit Cap, Take 1 capsule (50,000 Units total) by mouth every 7 days., Disp: 12 capsule, Rfl: 3    estradiol 0.05 mg/24 hr td ptsw (VIVELLE-DOT) 0.05 mg/24 hr, Place 1 patch onto the skin twice a week., Disp: 8 patch, Rfl: 11    ezetimibe (ZETIA) 10 mg tablet, Take 1 tablet (10 mg total) by mouth once daily., Disp: 90 tablet, Rfl: 3    hydroCHLOROthiazide (HYDRODIURIL) 25 MG tablet, Take 1 tablet (25 mg total) by mouth once daily., Disp: 90 tablet, Rfl: 3    levothyroxine (SYNTHROID) 50 MCG tablet, Take 1 tablet (50 mcg total) by mouth before breakfast., Disp: 90 tablet, Rfl: 3    losartan (COZAAR) 50 MG tablet, Take 1 tablet (50 mg total) by mouth once daily., Disp: 90 tablet, Rfl: 3    omeprazole (PRILOSEC) 20 MG capsule, Take 1 capsule (20 mg total) by mouth every 12 (twelve) hours. H pylori treatment for 14 days, Disp: 28 capsule, Rfl: 0    progesterone (PROMETRIUM) 100 MG capsule, Take 1 capsule (100 mg total) by mouth nightly., Disp: 30 capsule, Rfl: 11    sodium,potassium,mag sulfates (SUPREP BOWEL PREP KIT) 17.5-3.13-1.6 gram SolR, Take by mouth., Disp: , Rfl:     Current Facility-Administered Medications:     testosterone cypionate injection 50 mg, 50 mg, Intramuscular, Q28 Days, Melania Austin NP, 50 mg at 07/27/23 1000    Vitals:    09/25/23 1054   BP: 131/76   Pulse: 68   Weight: 67.6 kg (149 lb)   Height: 5' 6" (1.676 m)   PainSc: 0-No pain     Body mass index is 24.05 kg/m².       Assessment:    Menopausal symptoms        Plan:   Risks and benefits of hormone replacement therapy were discussed.  Hormone replacement therapy options, including bioidentical versus non-bioidentical hormones, as well as alternatives discussed.    Instructions from weaning from Vivelle Dot. Plan for break from HRT. Patient plans to contact if desires to restart and to update as to health status.    Follow up as " needed.  Instructed patient to call if she experiences any side effects or has any questions.       AKASH [facility]

## 2023-09-28 ENCOUNTER — HOSPITAL ENCOUNTER (OUTPATIENT)
Dept: RADIOLOGY | Facility: HOSPITAL | Age: 63
Discharge: HOME | End: 2023-09-28
Attending: INTERNAL MEDICINE
Payer: MEDICARE

## 2023-09-28 DIAGNOSIS — R93.89 ABNORMAL FINDING ON IMAGING: ICD-10-CM

## 2023-09-28 LAB
CREAT SERPL-MCNC: 0.8 MG/DL (ref 0.5–1.4)
SAMPLE: NORMAL

## 2023-09-28 PROCEDURE — A9698 NON-RAD CONTRAST MATERIALNOC: HCPCS | Performed by: INTERNAL MEDICINE

## 2023-09-28 PROCEDURE — 74160 CT ABDOMEN W/CONTRAST: CPT | Mod: 26,,, | Performed by: RADIOLOGY

## 2023-09-28 PROCEDURE — 25500020 PHARM REV CODE 255: Performed by: INTERNAL MEDICINE

## 2023-09-28 PROCEDURE — 74160 CT ABDOMEN W/CONTRAST: CPT | Mod: TC

## 2023-09-28 PROCEDURE — 74160 CT ABDOMEN WITH CONTRAST: ICD-10-PCS | Mod: 26,,, | Performed by: RADIOLOGY

## 2023-09-28 RX ADMIN — IOHEXOL 75 ML: 350 INJECTION, SOLUTION INTRAVENOUS at 05:09

## 2023-09-28 RX ADMIN — BARIUM SULFATE 450 ML: 20 SUSPENSION ORAL at 05:09

## 2023-10-05 ENCOUNTER — PATIENT MESSAGE (OUTPATIENT)
Dept: OBSTETRICS AND GYNECOLOGY | Facility: CLINIC | Age: 63
End: 2023-10-05
Payer: MEDICARE

## 2023-10-31 ENCOUNTER — PATIENT MESSAGE (OUTPATIENT)
Dept: GASTROENTEROLOGY | Facility: CLINIC | Age: 63
End: 2023-10-31

## 2023-10-31 ENCOUNTER — OFFICE VISIT (OUTPATIENT)
Dept: GASTROENTEROLOGY | Facility: CLINIC | Age: 63
End: 2023-10-31
Payer: MEDICARE

## 2023-10-31 DIAGNOSIS — R93.5 ABNORMAL ULTRASOUND OF ABDOMEN: ICD-10-CM

## 2023-10-31 DIAGNOSIS — K29.70 HELICOBACTER PYLORI GASTRITIS: ICD-10-CM

## 2023-10-31 DIAGNOSIS — R59.9 LYMPH NODE ENLARGEMENT: Primary | ICD-10-CM

## 2023-10-31 DIAGNOSIS — R93.5 ABNORMAL ABDOMINAL CT SCAN: ICD-10-CM

## 2023-10-31 DIAGNOSIS — A04.8 HELICOBACTER PYLORI INFECTION: Primary | ICD-10-CM

## 2023-10-31 DIAGNOSIS — B96.81 HELICOBACTER PYLORI GASTRITIS: ICD-10-CM

## 2023-10-31 PROCEDURE — 99214 OFFICE O/P EST MOD 30 MIN: CPT | Mod: 95,,, | Performed by: INTERNAL MEDICINE

## 2023-10-31 PROCEDURE — 99214 PR OFFICE/OUTPT VISIT, EST, LEVL IV, 30-39 MIN: ICD-10-PCS | Mod: 95,,, | Performed by: INTERNAL MEDICINE

## 2023-10-31 NOTE — Clinical Note
GI MA team - please order stool for H. Pylori Antigen in 4 weeks.  Please have patient also do 12 hour fasting blood work in 4 weeks   Criselda please schedule Vani for MRI MRCP in 8 weeks for follow-up leonel pancreatic lymph node seen on prior ultrasound and CT scan.  Orders placed for patient please schedule  Please have patient return to GI clinic 3-4 months for follow-up

## 2023-10-31 NOTE — PROGRESS NOTES
The patient location is: At home in [place]  The chief complaint leading to consultation is:  Follow-up peripancreatic lymph node H pylori epigastric pain history of colon polyp and family history of colon cancer    Visit type: audiovisual    Face to Face time with patient: 30 minutes of total time spent on the encounter, which includes face to face time and non-face to face time preparing to see the patient (eg, review of tests), Obtaining and/or reviewing separately obtained history, Documenting clinical information in the electronic or other health record, Independently interpreting results (not separately reported) and communicating results to the patient/family/caregiver, or Care coordination (not separately reported).       Each patient to whom he or she provides medical services by telemedicine is:  (1) informed of the relationship between the physician and patient and the respective role of any other health care provider with respect to management of the patient; and (2) notified that he or she may decline to receive medical services by telemedicine and may withdraw from such care at any time.    Notes:         [facility] Consultation Note    Reason for Consult:  The primary encounter diagnosis was Lymph node enlargement. Diagnoses of Helicobacter pylori gastritis, Abnormal abdominal CT scan, and Abnormal ultrasound of abdomen were also pertinent to this visit.    PCP:   Noah Monroy MD:  No referring provider defined for this encounter.    Initial History of Present Illness (HPI):  This is a 63 y.o. female here for evaluation of treatment evaluation for H pylori she recently underwent an EGD colonoscopy EGD showed H pylori gastritis will treat her with quadruple therapy for 14 days she never turned in her stool for H pylori antigen she is off antibiotics and Pepto-Bismol she is still taking her PPI once daily she understands check an H pylori stool antigen test she  has to be off all PPIs and off all H2 blockers for at least 2 weeks before stool collection in addition she also has to be off all antibiotics and all all Pepto-Bismol for at least 4 weeks before stool collection will set that up in the next 4 weeks.  She says after treatment for H pylori abdominal pain has gotten much better strangely enough her arthritis has gotten a lot better still has a little problem with her hip she underwent an EUS attempt to look at the peripancreatic lymph node seen on ultrasound but Dr. Mckee with advanced endoscopy was unable to pass the endoscopic ultrasound probe due to a tortuous esophagus he ordered a CT scan which showed once again a peripancreatic lymph node it is very nonspecific will recommend patient confirm H pylori eradication if not eradicated will treat with different H pylori regimen will plan for follow-up MRI MRCP evaluation of that lymph node and pancreas in about 8 weeks if still abnormal refer to Heme-Onc for further evaluation.      Abdominal pain - as above  Reflux - no  Dysphagia - no   Bowel habits - normal  GI bleeding - none  NSAID usage - aspirin     Interval HPI 08/15/2023:  The patient's last visit with me was on 7/17/2023.        ROS:  Constitutional: No fevers, chills, No weight loss  ENT:  occasional heartburn no dysphagia no odynophagia no hoarseness  CV: No chest pain, no palpitation  Pulm: No cough, no wheezing, sleep apnea on CPAP  Ophtho: No vision changes  GI: see HPI  Derm: No rash, no itching  Heme: No lymphadenopathy, No easy bruising  MSK:  Has arthritis  : No dysuria, No hematuria  Endo: No hot or cold intolerance  Neuro: No syncope, No seizure, no strokes  Psych: No uncontrolled anxiety, No uncontrolled depression      Interval HPI 10/31/2023:  The patient's last visit with me was on 8/15/2023.    Medical History:  has a past medical history of Abnormal Pap smear of cervix, GERD (gastroesophageal reflux disease), Hypertension, and  Hypothyroidism, unspecified.    Surgical History:  has a past surgical history that includes Hysterectomy; Augmentation of breast (Bilateral); Oophorectomy; Colonoscopy (N/A, 07/28/2023); Esophagogastroduodenoscopy (N/A, 07/28/2023); Endoscopic ultrasound of upper gastrointestinal tract (N/A, 09/19/2023); and Cervical biopsy w/ loop electrode excision.    Family History: family history includes Cancer in her paternal aunt; Colon cancer in her paternal grandfather..     Social History:  reports that she quit smoking about 17 months ago. Her smoking use included cigarettes. She started smoking about 50 years ago. She has a 49.0 pack-year smoking history. She does not have any smokeless tobacco history on file. She reports current alcohol use of about 1.0 standard drink of alcohol per week. She reports that she does not currently use drugs.    Review of patient's allergies indicates:  No Known Allergies    Medication List with Changes/Refills   Current Medications    ASPIRIN (ECOTRIN) 81 MG EC TABLET    Take 81 mg by mouth once daily.    CHOLECALCIFEROL, VITAMIN D3, (VITAMIN D3) 50 MCG (2,000 UNIT) CAP CAPSULE    Take 2 capsules (4,000 Units total) by mouth once daily.    DILTIAZEM (CARDIZEM CD) 240 MG 24 HR CAPSULE    Take 1 capsule (240 mg total) by mouth once daily.    ERGOCALCIFEROL (VITAMIN D2) 50,000 UNIT CAP    Take 1 capsule (50,000 Units total) by mouth every 7 days.    ESTRADIOL 0.05 MG/24 HR TD PTSW (VIVELLE-DOT) 0.05 MG/24 HR    Place 1 patch onto the skin twice a week.    EZETIMIBE (ZETIA) 10 MG TABLET    Take 1 tablet (10 mg total) by mouth once daily.    HYDROCHLOROTHIAZIDE (HYDRODIURIL) 25 MG TABLET    Take 1 tablet (25 mg total) by mouth once daily.    LEVOTHYROXINE (SYNTHROID) 50 MCG TABLET    Take 1 tablet (50 mcg total) by mouth before breakfast.    LOSARTAN (COZAAR) 50 MG TABLET    Take 1 tablet (50 mg total) by mouth once daily.    OMEPRAZOLE (PRILOSEC) 20 MG CAPSULE    Take 1 capsule (20 mg  total) by mouth every 12 (twelve) hours. H pylori treatment for 14 days    PROGESTERONE (PROMETRIUM) 100 MG CAPSULE    Take 1 capsule (100 mg total) by mouth nightly.   Discontinued Medications    SODIUM,POTASSIUM,MAG SULFATES (SUPREP BOWEL PREP KIT) 17.5-3.13-1.6 GRAM SOLR    Take by mouth.         Objective Findings:    Vital Signs:  There were no vitals taken for this visit.  There is no height or weight on file to calculate BMI.    Physical Exam:  Telemedicine video visit  General Appearance: Well appearing in no acute distress  Neurologic:  Alert and oriented x4  Psychiatric:  Normal speech mentation and affect    Labs:  Lab Results   Component Value Date    WBC 6.62 08/17/2023    HGB 15.9 08/17/2023    HCT 48.1 08/17/2023     08/17/2023    CHOL 164 02/07/2023    TRIG 77 02/07/2023    HDL 63 02/07/2023    ALT 10 08/17/2023    AST 15 08/17/2023     08/17/2023    K 4.1 08/17/2023     08/17/2023    CREATININE 0.9 08/17/2023    BUN 17 08/17/2023    CO2 27 08/17/2023    TSH 1.493 06/12/2023    HGBA1C 5.3 02/07/2023       Medical Decision Making:  Lab work reviewed  EGD and Colonoscopy images and path reviewed  EUS reviewed CT scan images personally reviewed by myself  H pylori talk given  Fasting lab talk given abdominal ultrasound talk given  GI MA team- Please tell patient that their EGD pathology is positive for and H. Pylori infection and recommend Quadruple therapy treatment for H. Pylori with:     Omeprazole 20mg 12 hours for 14 days  Doxycycline 100mg every 12 hours for 14 days  Metronidazole 250mg every 6 hours for 14 days  Bismuth subsalicylate 524mg every 6 hours for 14 days.      Don't drink alcohol on these medicines. Take after meals and drink plenty of water to make sure all pills clear esophagus.        Please mail patient UpToDate patient information on H. Pylori and have patient read this prior to beginning treatment.     GI MA team - please order stool for H. Pylori Antigen in 12  weeks.     Please tell patient to check for H. Pylori eradiation we would like to check stool sample in 12 weeks which tells us if H. Pylori has been successfully eradicated with the prior treatment medications.       H. Pylori antigen test which tells us if H. Pylori has been successfully eradicated with the prior treatment medications, and please tell patient that Stool H. Pylori antigen needs to be done off the following medications for the following amount of time:     1. Off all Antibiotics for 4 (Four) weeks before stool collection.       2. Off all Proton Pump Inhibitors medications for 2 (Two) weeks before collecting stool for H. Pylori Antigen:  :  Nexium (esomeprazole)  Prilosec (omeprazole)   Protonix (pantoprazole)  Prevacid (lansoprazole)  Aciphex (rabeprazole)  Dexilant (dexlansoprazole)    Zegerid      3. Off all H2 blockers medications for 2 (Two) weeks before collecting stool for H. Pylori Antigen:     Zantac (ranitidine)  Tagamet (cimetadine)  Axid (nizatidine)   Pepcid (famotidine)     4. Off Pepto-Bismol for 4 (four) weeks prior to collecting stool for H. Pylori Antigen.     The Olympus scope GIF-                          ([id number]) was introduced through the mouth, and                          advanced to the third part of duodenum. The upper                          GI endoscopy was accomplished without difficulty.                          The patient tolerated the procedure well.   Findings:        The examined duodenum was normal.        Patchy mildly erythematous mucosa without bleeding was found in the        gastric body, in the gastric antrum and in the prepyloric region of        the stomach. Biopsies were taken with a cold forceps for histology.        Biopsies were taken with a cold forceps for histology. Estimated        blood loss was minimal.        The cardia and gastric fundus were normal on retroflexion.        A 2 cm hiatal hernia was found. The proximal extent of the  gastric        folds (end of tubular esophagus) was 40 cm from the incisors. The        hiatal narrowing was 42 cm from the incisors. The Z-line was 40 cm        from the incisors.        A low-grade of narrowing, non-obstructing and mild Schatzki ring was        found at the gastroesophageal junction. A TTS dilator was passed        through the scope. Dilation with a 15-16.5-18 mm balloon dilator was        performed to 18 mm. The dilation site was examined following        endoscope reinsertion and showed no perforation.   Impression:            - Normal examined duodenum.                          - Erythematous mucosa in the gastric body, antrum                          and prepyloric region of the stomach. Biopsied.                          - 2 cm hiatal hernia.                          - Low-grade of narrowing, non-obstructing and mild                          Schatzki ring. Dilated.   Recommendation:        - Discharge patient to home.                          - Follow an antireflux regimen indefinitely.                          - Await pathology results.                          - Telephone endoscopist for pathology results in 3                          weeks.                          - Use Protonix 40 mg once daily (or any other full                          strength proton pump inhibitor) - best taken 45                          minutes before your first protein containing meal.                          - Repeat upper endoscopy PRN for retreatment.                          - Return to GI clinic at the next available                          appointment.                          - The findings and recommendations were discussed                          with the patient.   Attending Participation:        I personally performed the entire procedure.   Charles Shah MD   7/28/2023   The Olympus scope PCF-H190DL ([id number]) was                          introduced through the anus and advanced to the                           terminal ileum, with identification of the                          appendiceal orifice and IC valve. The colonoscopy                          was performed without difficulty. The patient                          tolerated the procedure well. The quality of the                          bowel preparation was excellent. Scope insertion                          time was 9 minutes. Scope withdrawal time was 10                          minutes. The total duration of the procedure was                          19 minutes. The terminal ileum, ileocecal valve,                          appendiceal orifice, and rectum were photographed.   Findings:        The terminal ileum appeared normal.        A 1 mm polyp was found in the cecum. The polyp was sessile. The        polyp was removed with a cold snare. Resection and retrieval were        complete. Estimated blood loss was minimal.        Diverticula were found in the recto-sigmoid colon, sigmoid colon,        descending colon, transverse colon and ascending colon.        The colon (entire examined portion) was moderately tortuous.        The perianal and digital rectal examinations were normal.        The retroflexed view of the distal rectum and anal verge was normal        and showed no anal or rectal abnormalities.        Non-bleeding internal hemorrhoids were found during retroflexion.        The hemorrhoids were mild.   Impression:            - The examined portion of the ileum was normal.                          - One 1 mm polyp in the cecum, removed with a cold                          snare. Resected and retrieved.                          - Diverticulosis in the recto-sigmoid colon, in                          the sigmoid colon, in the descending colon, in the                          transverse colon and in the ascending colon.                          - Tortuous colon.                          - Non-bleeding internal hemorrhoids.   Recommendation:         - Discharge patient to home.                          - Await pathology results.                          - Telephone endoscopist for pathology results in 3                          weeks.                          - Repeat colonoscopy in 3 years for surveillance                          based on pathology results.                          - Return to GI clinic.                          - The findings and recommendations were discussed                          with the patient.   Attending Participation:        I personally performed the entire procedure.   Charles Shah MD   7/28/2023    1. Stomach, biopsy:   - Helicobacter pylori-associated chronic mildly active gastritis   - Negative for intestinal metaplasia   - Negative for dysplasia or carcinoma     2. Colon, cecal polypectomy, biopsy:   - Benign polypoid piece of colonic mucosa with prominent lymphoid aggregate   - Negative for dysplasia or carcinoma on multiple examined H&E levels    Comment: Interp By Mike Polanco M.D., Signed on 08/02/2023       The Olympus scope [id number]                          ([id number]) was introduced through the mouth, with                          the intention of advancing to the esophagus. The                          scope was advanced to the cricopharyngeal                          esophagus before the procedure was aborted.                          Medications were given. The Olympus scope                          GIF- ([id number]) was introduced through the                          mouth, and advanced to the second part of                          duodenum. The upper EUS was aborted due to                          angulated UES and significant spasm. The upper EUS                          was performed with difficulty.   Findings:        ENDOSCOPIC FINDING: :        The cricopharyngeus was moderately tortuous.        The examined esophagus was normal.        The entire examined stomach was normal.        The  examined duodenum was normal.        Unable to complete the EUS due to angulation and spasm of the UES.   Impression:            - The upper EUS was aborted due to angulated UES                          and significant spasm.                          - Tortuous esophagus.                          - Normal esophagus.                          - Normal stomach.                          - Normal examined duodenum.                          - No specimens collected.   Recommendation:        - Discharge patient to home (ambulatory).                          - Perform CT scan (computed tomography) of the                          abdomen with contrast at appointment to be                          scheduled.   Attending Participation:        I personally performed the entire procedure.   Simone Mckee MD   9/19/2023   EXAMINATION:  CT ABDOMEN WITH CONTRAST     CLINICAL HISTORY:  abn imaging;  Abnormal findings on diagnostic imaging of other specified body structures     TECHNIQUE:  Low dose axial images, sagittal and coronal reformations were obtained from the lung bases to the iliac crests following the IV administration of 75 mL of Omnipaque 350 intravenous contrast. Oral contrast was administered.     COMPARISON:  U/S abdomen complete 08/17/2023     FINDINGS:  Lungs: Partially imaged opacities within the lateral aspect of the right lower lobe, corresponding to of bandlike scarring/atelectasis seen on prior CT chest.     Heart: Normal size. No pericardial effusion.     Liver: No focal abnormality.     Gallbladder: No pericholecystic inflammatory changes.     Bile ducts: No intrahepatic or extrahepatic biliary ductal dilatation.     Spleen: Normal size.     Pancreas: No mass or ductal dilatation.  No peripancreatic fat stranding.     Adrenals: No significant abnormality     Renal/Ureters: Kidneys enhance symmetrically.  No hydronephrosis.  Proximal ureters are normal in course and caliber.     Stomach/Bowel: No evidence of  obstruction or inflammatory changes.     Peritoneum: No free fluid within the abdomen.  No intraperitoneal free air.     Lymph Nodes: Small peripancreatic lymph node.  No pathologic mira enlargement in the abdomen.     Vasculature: Abdominal aorta tapers normally.  Mild atherosclerosis of the abdominal aorta and its branches. Portal vasculature, SMV, and splenic vein are patent.     Bones: Degenerative changes of the spine.  No acute fractures or osseous destructive lesions.     Soft Tissues: No significant abnormality.     Impression:     No abnormal intra-abdominal findings.     Small peripancreatic lymph node corresponding to findings on prior ultrasound.  This node is not pathologically enlarged.  No pathologic mira enlargement in the abdomen.     Electronically signed by resident: Shahriar Hernandez  Date:                                            09/29/2023  Time:                                           08:55     Electronically signed by: Ray Kennedy MD  Date:                                            09/29/2023  Reason for Exam  Priority: Routine  Dx: Coronary artery calcification seen on CT scan [I25.10 (ICD-10-CM)]; Aortic atherosclerosis [I70.0 (ICD-10-CM)]     View Images Vital Vitrea     Show images for Stress Echo Which stress agent will be used? Treadmill Exercise; Color Flow Doppler? No  Summary    The left ventricle is normal in size with normal systolic function.  The estimated ejection fraction is 65%.  Normal right ventricular size with normal right ventricular systolic function.  The test was stopped because the patient experienced shortness of breath.  Normal left ventricular diastolic function.  There were no arrhythmias during stress.  Mild right atrial enlargement.  Mild tricuspid regurgitation.  The patient's exercise capacity was above average.  Normal central venous pressure (3 mmHg).  The estimated PA systolic pressure is 25 mmHg.  The ECG portion of this study is negative for myocardial  ischemia.  The stress echo portion of this study is negative for myocardial ischemia.  Assessment:  1. Lymph node enlargement    2. Helicobacter pylori gastritis    3. Abnormal abdominal CT scan    4. Abnormal ultrasound of abdomen         Recommendations:  1. 12 hour fasting blood work in 4 weeks with stool for H pylori antigen in 4 weeks this has to be done off antibiotics for least 4 weeks off Pepto-Bismol for at least 4 weeks and off all PPIs and H2 blockers for at least 2 weeks to check for H pylori a eradication.  2. Schedule MRI MRCP for further evaluation of history of epigastric pain and peripancreatic lymph node which could not be evaluated by endoscopic ultrasound because Dr. Mckee was unable to pass the EUS probe due to a tortuous esophagus.  3. Sleep apnea on CPAP love in it  4.  Patient with family history of colon cancer personal history of colon polyp next surveillance colonoscopy in 3 years.  From her last which will be July of 2026  5. Return GI clinic 3 months for follow-up okay for telemedicine video visit    Follow up in about 3 months (around 1/31/2024).      Order summary:         Thank you so much for allowing me to participate in the care of Noar Tao Shah MD    DISCLAIMER: This note was prepared with [employer] voice recognition transcription software. Garbled syntax, mangled or inadvertent pronouns, and other bizarre constructions may be attributed to that software system. While efforts were made to correct any mistakes made by this voice recognition program, some errors and/or omissions may remain in the note that were missed when the note was originally created.

## 2023-10-31 NOTE — PATIENT INSTRUCTIONS
GI MA team - please order stool for H. Pylori Antigen in 4 weeks.     Please tell patient to check for H. Pylori eradiation we would like to check stool sample in 4 weeks which tells us if H. Pylori has been successfully eradicated with the prior treatment medications.       H. Pylori antigen test which tells us if H. Pylori has been successfully eradicated with the prior treatment medications, and please tell patient that Stool H. Pylori antigen needs to be done off the following medications for the following amount of time:     1. Off all Antibiotics for 4 (Four) weeks before stool collection.       2. Off all Proton Pump Inhibitors medications for 2 (Two) weeks before collecting stool for H. Pylori Antigen:  :  Nexium (esomeprazole)  Prilosec (omeprazole)   Protonix (pantoprazole)  Prevacid (lansoprazole)  Aciphex (rabeprazole)  Dexilant (dexlansoprazole)    Zegerid      3. Off all H2 blockers medications for 2 (Two) weeks before collecting stool for H. Pylori Antigen:     Zantac (ranitidine)  Tagamet (cimetadine)  Axid (nizatidine)   Pepcid (famotidine)     4. Off Pepto-Bismol for 4 (four) weeks prior to collecting stool for H. Pylori Antigen.

## 2023-11-01 DIAGNOSIS — R93.5 ABNORMAL ABDOMINAL ULTRASOUND: ICD-10-CM

## 2023-11-01 DIAGNOSIS — K86.9 PANCREAS DISORDER: ICD-10-CM

## 2023-11-01 DIAGNOSIS — R59.1 LYMPHADENOPATHY: Primary | ICD-10-CM

## 2023-11-01 DIAGNOSIS — R10.9 ABDOMINAL PAIN, UNSPECIFIED ABDOMINAL LOCATION: ICD-10-CM

## 2023-11-01 DIAGNOSIS — R93.5 ABNORMAL ABDOMINAL CT SCAN: ICD-10-CM

## 2023-11-03 ENCOUNTER — PATIENT MESSAGE (OUTPATIENT)
Dept: GASTROENTEROLOGY | Facility: CLINIC | Age: 63
End: 2023-11-03
Payer: MEDICARE

## 2023-11-03 ENCOUNTER — TELEPHONE (OUTPATIENT)
Dept: GASTROENTEROLOGY | Facility: CLINIC | Age: 63
End: 2023-11-03
Payer: MEDICARE

## 2023-11-03 DIAGNOSIS — R59.1 LYMPHADENOPATHY: ICD-10-CM

## 2023-11-03 DIAGNOSIS — R93.5 ABNORMAL ABDOMINAL CT SCAN: Primary | ICD-10-CM

## 2023-11-22 ENCOUNTER — OFFICE VISIT (OUTPATIENT)
Dept: SLEEP MEDICINE | Facility: CLINIC | Age: 63
End: 2023-11-22
Payer: MEDICARE

## 2023-11-22 DIAGNOSIS — G47.33 OSA (OBSTRUCTIVE SLEEP APNEA): Primary | ICD-10-CM

## 2023-11-22 PROCEDURE — 99214 PR OFFICE/OUTPT VISIT, EST, LEVL IV, 30-39 MIN: ICD-10-PCS | Mod: 95,,, | Performed by: PHYSICIAN ASSISTANT

## 2023-11-22 PROCEDURE — 99214 OFFICE O/P EST MOD 30 MIN: CPT | Mod: 95,,, | Performed by: PHYSICIAN ASSISTANT

## 2023-11-22 NOTE — PROGRESS NOTES
The chief complaint leading to consultation is: sleep problems    Visit type: audiovisual     The patient location is: [place]    13 minutes of total time spent on the encounter, which includes face to face time and non-face to face time preparing to see the patient (eg, review of tests), Obtaining and/or reviewing separately obtained history, Documenting clinical information in the electronic or other health record, Independently interpreting results (not separately reported) and communicating results to the patient/family/caregiver, or Care coordination (not separately reported).     Each patient to whom he or she provides medical services by telemedicine is:  (1) informed of the relationship between the physician and patient and the respective role of any other health care provider with respect to management of the patient; and (2) notified that he or she may decline to receive medical services by telemedicine and may withdraw from such care at any time.          Referred by No ref. provider found     ESTABLISHED PATIENT VISIT  New to me. Previously evaluated by Dr Nomi Melendez Tao  is a pleasant 63 y.o. female  with PMH significant for H. Pylori, emphysema, former smoker (40+ ppd), osteopenia, menopause, HTN, hypothyroidism, aortic atherosclerosis, TERESA      Here today for: CPAP follow-up     PLAN last visit 9/5/23:   -recommend sleep testing   -discussed trial therapy if TERESA present and the patient is  open to a trial of CPAP therapy      Since last visit:   Using nightly with significant improvement in symptoms. Tolerating pressures well. Is having some issues with mask fit and comfort, she would like to try a different mask type.      PAP history   Problems    Mask FFM w memory foam   Pressure 6-12cwp   DME HME   Machine age AS10 9/27/23   Download 11/22/23: 30/30 x 6hrs 13mins, 6-12cwp (9.3/11.7/11.9), leak (2.8/7.8/25.8), AHI 2.7       SLEEP SCHEDULE   Bed Time 9-10pm   Sleep Latency 2 hours   Arousals  2   Back to sleep Couple mins   Wake time 6am   Naps 2   Nocturia 2   Work Retired,          Past Medical History:   Diagnosis Date    Abnormal Pap smear of cervix     GERD (gastroesophageal reflux disease)     Hypertension     Hypothyroidism, unspecified      Patient Active Problem List   Diagnosis    Hypothyroidism (acquired)    PVC (premature ventricular contraction)    Primary hypertension    Cardiac murmur    Osteopenia of neck of femur    Esophageal stricture    Pelvic floor dysfunction    Coordination impairment    Chronic left hip pain    Coronary artery calcification seen on CT scan       Current Outpatient Medications:     aspirin (ECOTRIN) 81 MG EC tablet, Take 81 mg by mouth once daily., Disp: , Rfl:     cholecalciferol, vitamin D3, (VITAMIN D3) 50 mcg (2,000 unit) Cap capsule, Take 2 capsules (4,000 Units total) by mouth once daily., Disp: 180 capsule, Rfl: 3    diltiaZEM (CARDIZEM CD) 240 MG 24 hr capsule, Take 1 capsule (240 mg total) by mouth once daily., Disp: 90 capsule, Rfl: 3    ergocalciferol (VITAMIN D2) 50,000 unit Cap, Take 1 capsule (50,000 Units total) by mouth every 7 days., Disp: 12 capsule, Rfl: 3    estradiol 0.05 mg/24 hr td ptsw (VIVELLE-DOT) 0.05 mg/24 hr, Place 1 patch onto the skin twice a week., Disp: 8 patch, Rfl: 11    ezetimibe (ZETIA) 10 mg tablet, Take 1 tablet (10 mg total) by mouth once daily., Disp: 90 tablet, Rfl: 3    hydroCHLOROthiazide (HYDRODIURIL) 25 MG tablet, Take 1 tablet (25 mg total) by mouth once daily., Disp: 90 tablet, Rfl: 3    levothyroxine (SYNTHROID) 50 MCG tablet, Take 1 tablet (50 mcg total) by mouth before breakfast., Disp: 90 tablet, Rfl: 3    losartan (COZAAR) 50 MG tablet, Take 1 tablet (50 mg total) by mouth once daily., Disp: 90 tablet, Rfl: 3    omeprazole (PRILOSEC) 20 MG capsule, Take 1 capsule (20 mg total) by mouth every 12 (twelve) hours. H pylori treatment for 14 days, Disp: 28 capsule, Rfl: 0    progesterone (PROMETRIUM) 100 MG  capsule, Take 1 capsule (100 mg total) by mouth nightly., Disp: 30 capsule, Rfl: 11     There were no vitals filed for this visit.    Physical Exam:    GEN:   Well-appearing  Psych:  Appropriate affect, demonstrates insight  SKIN:  No rash on the face or bridge of the nose      LABS:   Lab Results   Component Value Date    HGB 15.9 08/17/2023    CO2 27 08/17/2023       RECORDS REVIEWED PREVIOUSLY:    9/7/23 HST: AHI 12, RDI 17    Previous sleep note 9/5/23    CPAP interrogation 11/22/23: 30/30 x 6hrs 13mins, 6-12cwp (9.3/11.7/11.9), leak (2.8/7.8/25.8), AHI 2.7    ASSESSMENT    ESS 6/24 Today    PROBLEM DESCRIPTION/ Sx on Presentation Interval Hx  STATUS   TERESA    + loud snoring,  sleeps separately,    + witnessed apneas   +TERESA In brother and sister    good usage and efficiency  controlled   Daytime Sx    + sleepiness when inactive   ESS 10/24 on intake (reviewed from 9/5/23)  less sleepy on CPAP, no longer needs naps daily improved   Insomnia     Trouble falling asleep: yes  Maintenance:         waking frequently   Prior hypnotics:        Current hypnotics: trazodone 50mg     sleep is more consolidated on CPAP improved   Nocturia    x 2 per sleep period  0-1 x nightly improved   Other issues: emphysema    PLAN     -using and benefiting from CPAP therapy  -continue CPAP 6-12cwp nightly  -discuss mask change with E ([phone])  -CPAP supplies ordered  -discussed TERESA and CPAP with patient in detail, including possible complications of untreated TERESA like heart attack/stroke  -advised on strict driving precautions; advised never to drive drowsy    Advised on plan of care. Answered all patient questions. Patient verbalized understanding and voiced agreement with plan of care.       RTC 12 months or as needed     The patient was given open opportunity to ask questions and/or express concerns about treatment plan.   All questions/concerns were discussed.     Two patient identifiers used prior to evaluation.

## 2023-11-29 ENCOUNTER — LAB VISIT (OUTPATIENT)
Dept: LAB | Facility: HOSPITAL | Age: 63
End: 2023-11-29
Attending: INTERNAL MEDICINE
Payer: MEDICARE

## 2023-11-29 DIAGNOSIS — A04.8 HELICOBACTER PYLORI INFECTION: ICD-10-CM

## 2023-11-29 PROCEDURE — 87338 HPYLORI STOOL AG IA: CPT | Performed by: INTERNAL MEDICINE

## 2023-12-05 ENCOUNTER — PATIENT MESSAGE (OUTPATIENT)
Dept: GASTROENTEROLOGY | Facility: CLINIC | Age: 63
End: 2023-12-05
Payer: MEDICARE

## 2023-12-05 DIAGNOSIS — B96.81 HELICOBACTER PYLORI GASTRITIS: ICD-10-CM

## 2023-12-05 DIAGNOSIS — K29.70 HELICOBACTER PYLORI GASTRITIS: ICD-10-CM

## 2023-12-05 DIAGNOSIS — R10.13 EPIGASTRIC PAIN: ICD-10-CM

## 2023-12-05 LAB — H PYLORI AG STL QL IA: NOT DETECTED

## 2023-12-05 RX ORDER — OMEPRAZOLE 20 MG/1
20 CAPSULE, DELAYED RELEASE ORAL
Qty: 90 CAPSULE | Refills: 3 | Status: SHIPPED | OUTPATIENT
Start: 2023-12-05 | End: 2023-12-08 | Stop reason: SDUPTHER

## 2023-12-05 RX ORDER — OMEPRAZOLE 20 MG/1
20 CAPSULE, DELAYED RELEASE ORAL EVERY 12 HOURS
Qty: 28 CAPSULE | Refills: 0 | Status: CANCELLED | OUTPATIENT
Start: 2023-12-05 | End: 2023-12-19

## 2023-12-05 NOTE — TELEPHONE ENCOUNTER
No care due was identified.  [facility] Embedded Care Due Messages. Reference number: [id number].   12/05/2023 9:19:39 AM CST

## 2023-12-05 NOTE — TELEPHONE ENCOUNTER
Refill Routing Note   Medication(s) are not appropriate for processing by [facility] for the following reason(s):        Clarification of medication (Rx) details: still 14 day therapy only?    [facility] action(s):  Defer      Medication Therapy Plan:         Appointments  past 12m or future 3m with PCP    Date Provider   Last Visit   2/6/2023 Noah Monroy MD   Next Visit   2/6/2024 Noah Monroy MD   ED visits in past 90 days: 0        Note composed:2:31 PM 12/05/2023

## 2023-12-07 ENCOUNTER — IMMUNIZATION (OUTPATIENT)
Dept: INTERNAL MEDICINE | Facility: CLINIC | Age: 63
End: 2023-12-07
Payer: MEDICARE

## 2023-12-07 DIAGNOSIS — Z23 NEED FOR VACCINATION: Primary | ICD-10-CM

## 2023-12-07 PROCEDURE — 99999PBSHW FLU VACCINE - QUADRIVALENT - ADJUVANTED: ICD-10-PCS | Mod: PBBFAC,,,

## 2023-12-07 PROCEDURE — 99999PBSHW FLU VACCINE - QUADRIVALENT - ADJUVANTED: Mod: PBBFAC,,,

## 2023-12-07 PROCEDURE — G0008 ADMIN INFLUENZA VIRUS VAC: HCPCS | Mod: PBBFAC

## 2023-12-08 RX ORDER — OMEPRAZOLE 20 MG/1
20 CAPSULE, DELAYED RELEASE ORAL
Qty: 90 CAPSULE | Refills: 3 | Status: SHIPPED | OUTPATIENT
Start: 2023-12-08 | End: 2024-02-06 | Stop reason: SDUPTHER

## 2024-01-08 ENCOUNTER — LAB VISIT (OUTPATIENT)
Dept: LAB | Facility: HOSPITAL | Age: 64
End: 2024-01-08
Attending: INTERNAL MEDICINE
Payer: MEDICARE

## 2024-01-08 ENCOUNTER — OFFICE VISIT (OUTPATIENT)
Dept: INTERNAL MEDICINE | Facility: CLINIC | Age: 64
End: 2024-01-08
Payer: MEDICARE

## 2024-01-08 VITALS
SYSTOLIC BLOOD PRESSURE: 140 MMHG | BODY MASS INDEX: 24.63 KG/M2 | OXYGEN SATURATION: 98 % | WEIGHT: 153.25 LBS | DIASTOLIC BLOOD PRESSURE: 70 MMHG | HEART RATE: 105 BPM | HEIGHT: 66 IN

## 2024-01-08 DIAGNOSIS — L50.9 URTICARIA: Primary | ICD-10-CM

## 2024-01-08 DIAGNOSIS — R50.9 FEBRILE ILLNESS: ICD-10-CM

## 2024-01-08 DIAGNOSIS — L50.9 URTICARIA: ICD-10-CM

## 2024-01-08 LAB
ALBUMIN SERPL BCP-MCNC: 4.3 G/DL (ref 3.5–5.2)
ALP SERPL-CCNC: 66 U/L (ref 55–135)
ALT SERPL W/O P-5'-P-CCNC: 14 U/L (ref 10–44)
ANION GAP SERPL CALC-SCNC: 8 MMOL/L (ref 8–16)
AST SERPL-CCNC: 18 U/L (ref 10–40)
BASOPHILS # BLD AUTO: 0.01 K/UL (ref 0–0.2)
BASOPHILS NFR BLD: 0.1 % (ref 0–1.9)
BILIRUB SERPL-MCNC: 0.4 MG/DL (ref 0.1–1)
BUN SERPL-MCNC: 15 MG/DL (ref 8–23)
CALCIUM SERPL-MCNC: 9.7 MG/DL (ref 8.7–10.5)
CHLORIDE SERPL-SCNC: 106 MMOL/L (ref 95–110)
CO2 SERPL-SCNC: 30 MMOL/L (ref 23–29)
CREAT SERPL-MCNC: 0.8 MG/DL (ref 0.5–1.4)
CRP SERPL-MCNC: 7 MG/L (ref 0–8.2)
CTP QC/QA: YES
CTP QC/QA: YES
DIFFERENTIAL METHOD BLD: NORMAL
EOSINOPHIL # BLD AUTO: 0.1 K/UL (ref 0–0.5)
EOSINOPHIL NFR BLD: 0.7 % (ref 0–8)
ERYTHROCYTE [DISTWIDTH] IN BLOOD BY AUTOMATED COUNT: 12.7 % (ref 11.5–14.5)
ERYTHROCYTE [SEDIMENTATION RATE] IN BLOOD BY PHOTOMETRIC METHOD: <2 MM/HR (ref 0–36)
EST. GFR  (NO RACE VARIABLE): >60 ML/MIN/1.73 M^2
GLUCOSE SERPL-MCNC: 91 MG/DL (ref 70–110)
HCT VFR BLD AUTO: 46.7 % (ref 37–48.5)
HGB BLD-MCNC: 15.3 G/DL (ref 12–16)
IMM GRANULOCYTES # BLD AUTO: 0.03 K/UL (ref 0–0.04)
IMM GRANULOCYTES NFR BLD AUTO: 0.4 % (ref 0–0.5)
LYMPHOCYTES # BLD AUTO: 1.8 K/UL (ref 1–4.8)
LYMPHOCYTES NFR BLD: 24 % (ref 18–48)
MCH RBC QN AUTO: 29.4 PG (ref 27–31)
MCHC RBC AUTO-ENTMCNC: 32.8 G/DL (ref 32–36)
MCV RBC AUTO: 90 FL (ref 82–98)
MONOCYTES # BLD AUTO: 0.4 K/UL (ref 0.3–1)
MONOCYTES NFR BLD: 4.9 % (ref 4–15)
NEUTROPHILS # BLD AUTO: 5.1 K/UL (ref 1.8–7.7)
NEUTROPHILS NFR BLD: 69.9 % (ref 38–73)
NRBC BLD-RTO: 0 /100 WBC
PLATELET # BLD AUTO: 284 K/UL (ref 150–450)
PMV BLD AUTO: 10.8 FL (ref 9.2–12.9)
POC MOLECULAR INFLUENZA A AGN: NEGATIVE
POC MOLECULAR INFLUENZA B AGN: NEGATIVE
POTASSIUM SERPL-SCNC: 4.2 MMOL/L (ref 3.5–5.1)
PROT SERPL-MCNC: 7.3 G/DL (ref 6–8.4)
RBC # BLD AUTO: 5.2 M/UL (ref 4–5.4)
SARS-COV-2 RDRP RESP QL NAA+PROBE: NEGATIVE
SODIUM SERPL-SCNC: 144 MMOL/L (ref 136–145)
WBC # BLD AUTO: 7.28 K/UL (ref 3.9–12.7)

## 2024-01-08 PROCEDURE — 36415 COLL VENOUS BLD VENIPUNCTURE: CPT | Performed by: INTERNAL MEDICINE

## 2024-01-08 PROCEDURE — 85025 COMPLETE CBC W/AUTO DIFF WBC: CPT | Performed by: INTERNAL MEDICINE

## 2024-01-08 PROCEDURE — 87635 SARS-COV-2 COVID-19 AMP PRB: CPT | Mod: PBBFAC | Performed by: INTERNAL MEDICINE

## 2024-01-08 PROCEDURE — 99999PBSHW POCT INFLUENZA A/B MOLECULAR: Mod: PBBFAC,,,

## 2024-01-08 PROCEDURE — 99213 OFFICE O/P EST LOW 20 MIN: CPT | Mod: S$PBB,,, | Performed by: INTERNAL MEDICINE

## 2024-01-08 PROCEDURE — 86140 C-REACTIVE PROTEIN: CPT | Performed by: INTERNAL MEDICINE

## 2024-01-08 PROCEDURE — 99213 OFFICE O/P EST LOW 20 MIN: CPT | Mod: PBBFAC | Performed by: INTERNAL MEDICINE

## 2024-01-08 PROCEDURE — 85652 RBC SED RATE AUTOMATED: CPT | Performed by: INTERNAL MEDICINE

## 2024-01-08 PROCEDURE — 99999PBSHW: Mod: PBBFAC,,,

## 2024-01-08 PROCEDURE — 80053 COMPREHEN METABOLIC PANEL: CPT | Performed by: INTERNAL MEDICINE

## 2024-01-08 PROCEDURE — 99999 PR PBB SHADOW E&M-EST. PATIENT-LVL III: CPT | Mod: PBBFAC,,, | Performed by: INTERNAL MEDICINE

## 2024-01-08 PROCEDURE — 87502 INFLUENZA DNA AMP PROBE: CPT | Mod: PBBFAC | Performed by: INTERNAL MEDICINE

## 2024-01-08 RX ORDER — PREDNISONE 20 MG/1
40 TABLET ORAL DAILY
Qty: 10 TABLET | Refills: 0 | Status: SHIPPED | OUTPATIENT
Start: 2024-01-08 | End: 2024-02-06 | Stop reason: ALTCHOICE

## 2024-01-08 RX ORDER — PREDNISONE 20 MG/1
40 TABLET ORAL DAILY
Qty: 10 TABLET | Refills: 0 | Status: SHIPPED | OUTPATIENT
Start: 2024-01-08 | End: 2024-01-08

## 2024-01-08 RX ORDER — DIPHENHYDRAMINE HCL 25 MG
25 TABLET ORAL EVERY 6 HOURS PRN
Qty: 20 TABLET | Refills: 0 | Status: SHIPPED | OUTPATIENT
Start: 2024-01-08 | End: 2024-02-06 | Stop reason: ALTCHOICE

## 2024-01-08 RX ORDER — DIPHENHYDRAMINE HCL 25 MG
25 TABLET ORAL EVERY 6 HOURS PRN
Qty: 20 TABLET | Refills: 0 | Status: SHIPPED | OUTPATIENT
Start: 2024-01-08 | End: 2024-01-08

## 2024-01-08 RX ORDER — FAMOTIDINE 20 MG/1
20 TABLET, FILM COATED ORAL 2 TIMES DAILY
Qty: 20 TABLET | Refills: 0 | Status: SHIPPED | OUTPATIENT
Start: 2024-01-08 | End: 2024-01-08

## 2024-01-08 RX ORDER — FAMOTIDINE 20 MG/1
20 TABLET, FILM COATED ORAL 2 TIMES DAILY
Qty: 20 TABLET | Refills: 0 | Status: SHIPPED | OUTPATIENT
Start: 2024-01-08 | End: 2024-02-06 | Stop reason: ALTCHOICE

## 2024-01-08 NOTE — PROGRESS NOTES
Subjective:       Patient ID: Nora Burger is a 63 y.o. female.    Chief Complaint: Rash      HPI  Nora Burger is a 63 y.o. year old female established patient presents for full body rash. Rash started in inguinal regions, raised, bumpy, hive like reaction. Started last night around 7 pm. Associated low grade temp 100.2. Has been taking benadryl 50 for relief of itch. Denies any difficulty breathing, clearing secretions.     Ate steak for lunch, cereal and milk for dinner. Denies any new medications, denies any new exposures. Has never had allergies previously.     Did start a different brand of estrogen last month, had already removed patch.    Review of Systems   Constitutional:  Positive for fatigue and fever.   HENT:  Negative for congestion, rhinorrhea and sore throat.    Eyes:  Negative for pain.   Respiratory:  Negative for cough and shortness of breath.    Gastrointestinal:  Negative for diarrhea and vomiting.   Skin:  Positive for rash.         Past Medical History:   Diagnosis Date    Abnormal Pap smear of cervix     GERD (gastroesophageal reflux disease)     Hypertension     Hypothyroidism, unspecified         Prior to Admission medications    Medication Sig Start Date End Date Taking? Authorizing Provider   aspirin (ECOTRIN) 81 MG EC tablet Take 81 mg by mouth once daily.   Yes Provider, Historical   cholecalciferol, vitamin D3, (VITAMIN D3) 50 mcg (2,000 unit) Cap capsule Take 2 capsules (4,000 Units total) by mouth once daily. 7/17/23 7/16/24 Yes Charles Shah MD   diltiaZEM (CARDIZEM CD) 240 MG 24 hr capsule Take 1 capsule (240 mg total) by mouth once daily. 2/6/23  Yes Noah Monroy MD   estradiol 0.05 mg/24 hr td ptsw (VIVELLE-DOT) 0.05 mg/24 hr Place 1 patch onto the skin twice a week. 6/19/23 6/18/24 Yes Melania Austin NP   ezetimibe (ZETIA) 10 mg tablet Take 1 tablet (10 mg total) by mouth once daily. 5/10/23 5/9/24 Yes Erasmo Evangelista MD   hydroCHLOROthiazide (HYDRODIURIL) 25  "MG tablet Take 1 tablet (25 mg total) by mouth once daily. 2/6/23  Yes Noah Monroy MD   levothyroxine (SYNTHROID) 50 MCG tablet Take 1 tablet (50 mcg total) by mouth before breakfast. 2/6/23  Yes Noah Monroy MD   losartan (COZAAR) 50 MG tablet Take 1 tablet (50 mg total) by mouth once daily. 2/6/23  Yes Noah Monroy MD   omeprazole (PRILOSEC) 20 MG capsule Take 1 capsule (20 mg total) by mouth before breakfast. Best taken 45-60 minutes before your 1st protein meal of the day breakfast 12/8/23 12/7/24 Yes Noah Monroy MD   ergocalciferol (VITAMIN D2) 50,000 unit Cap Take 1 capsule (50,000 Units total) by mouth every 7 days. 6/19/23 6/18/24  Melania Austin NP   progesterone (PROMETRIUM) 100 MG capsule Take 1 capsule (100 mg total) by mouth nightly. 6/19/23 6/18/24  Melania Austin, SHARRON        Past medical history, surgical history, and family medical history reviewed and updated as appropriate.    Medications and allergies reviewed.     Objective:          Vitals:    01/08/24 1138   BP: (!) 140/70   BP Location: Right arm   Patient Position: Sitting   Pulse: 105   SpO2: 98%   Weight: 69.5 kg (153 lb 3.5 oz)   Height: 5' 6" (1.676 m)     Body mass index is 24.73 kg/m².  Physical Exam  Constitutional:       General: She is not in acute distress.     Appearance: Normal appearance. She is not ill-appearing or diaphoretic.      Comments: Uncomfortable appearing   Pulmonary:      Effort: No respiratory distress.   Skin:     Comments: Diffuse urticaria   Neurological:      General: No focal deficit present.      Mental Status: She is alert.   Psychiatric:         Mood and Affect: Mood normal.         Lab Results   Component Value Date    WBC 6.62 08/17/2023    HGB 15.9 08/17/2023    HCT 48.1 08/17/2023     08/17/2023    CHOL 164 02/07/2023    TRIG 77 02/07/2023    HDL 63 02/07/2023    ALT 10 08/17/2023    AST 15 08/17/2023     08/17/2023    K 4.1 08/17/2023     08/17/2023    CREATININE 0.9 " 08/17/2023    BUN 17 08/17/2023    CO2 27 08/17/2023    TSH 1.493 06/12/2023    HGBA1C 5.3 02/07/2023       Assessment:       1. Urticaria    2. Febrile illness          Plan:     Nora was seen today for rash.    Diagnoses and all orders for this visit:    Urticaria  Comments:  unclear etiology, will treat as allergic reaction, prednisone, H1, H2 blockers. To follow up in 1 week.  Orders:  -     famotidine (PEPCID) 20 MG tablet; Take 1 tablet (20 mg total) by mouth 2 (two) times daily. for 10 days  -     predniSONE (DELTASONE) 20 MG tablet; Take 2 tablets (40 mg total) by mouth once daily.  -     diphenhydrAMINE (BENADRYL ALLERGY) 25 mg tablet; Take 1 tablet (25 mg total) by mouth every 6 (six) hours as needed for Insomnia.  -     POCT COVID-19 Rapid Screening  -     POCT Influenza A/B Molecular  -     CBC W/ AUTO DIFFERENTIAL; Future  -     COMPREHENSIVE METABOLIC PANEL; Future  -     Sedimentation rate; Future  -     C-REACTIVE PROTEIN; Future    Febrile illness  -     POCT COVID-19 Rapid Screening  -     POCT Influenza A/B Molecular    R/o viral prodrome  Obtain labs / inflammatory markers  Urticaria of certain etiology  Treat empirically with H1, H2, prednisone.     Health maintenance reviewed with patient.     Follow up in about 2 weeks (around 1/22/2024).    Noah Monroy MD  Internal Medicine / Primary Care  [facility]  1/8/2024

## 2024-01-08 NOTE — PATIENT INSTRUCTIONS
Labwork today at [facility] lab  Prescriptions at primary care pharmacy    Return to clinic in 2 weeks

## 2024-01-11 ENCOUNTER — PATIENT MESSAGE (OUTPATIENT)
Dept: INTERNAL MEDICINE | Facility: CLINIC | Age: 64
End: 2024-01-11
Payer: MEDICARE

## 2024-01-16 ENCOUNTER — TELEPHONE (OUTPATIENT)
Dept: OBSTETRICS AND GYNECOLOGY | Facility: CLINIC | Age: 64
End: 2024-01-16
Payer: MEDICARE

## 2024-01-16 NOTE — TELEPHONE ENCOUNTER
Spoke to pt to reschedule appt.  Pt states she had a severe reaction to the estrogen when her brand switched.  Would like to follow up with Melania.    Rescheduled.

## 2024-01-19 ENCOUNTER — TELEPHONE (OUTPATIENT)
Dept: OBSTETRICS AND GYNECOLOGY | Facility: CLINIC | Age: 64
End: 2024-01-19
Payer: MEDICARE

## 2024-01-22 ENCOUNTER — OFFICE VISIT (OUTPATIENT)
Dept: INTERNAL MEDICINE | Facility: CLINIC | Age: 64
End: 2024-01-22
Payer: MEDICARE

## 2024-01-22 VITALS
HEART RATE: 71 BPM | OXYGEN SATURATION: 98 % | DIASTOLIC BLOOD PRESSURE: 72 MMHG | SYSTOLIC BLOOD PRESSURE: 118 MMHG | WEIGHT: 150.81 LBS | BODY MASS INDEX: 24.24 KG/M2 | HEIGHT: 66 IN

## 2024-01-22 DIAGNOSIS — Z09 FOLLOW-UP EXAM: Primary | ICD-10-CM

## 2024-01-22 DIAGNOSIS — E03.9 HYPOTHYROIDISM (ACQUIRED): ICD-10-CM

## 2024-01-22 DIAGNOSIS — G89.29 CHRONIC BILATERAL LOW BACK PAIN WITHOUT SCIATICA: ICD-10-CM

## 2024-01-22 DIAGNOSIS — M54.50 CHRONIC BILATERAL LOW BACK PAIN WITHOUT SCIATICA: ICD-10-CM

## 2024-01-22 DIAGNOSIS — T78.40XD ALLERGIC REACTION, SUBSEQUENT ENCOUNTER: ICD-10-CM

## 2024-01-22 PROCEDURE — 99999 PR PBB SHADOW E&M-EST. PATIENT-LVL IV: CPT | Mod: PBBFAC,,, | Performed by: INTERNAL MEDICINE

## 2024-01-22 PROCEDURE — 99214 OFFICE O/P EST MOD 30 MIN: CPT | Mod: S$PBB,,, | Performed by: INTERNAL MEDICINE

## 2024-01-22 PROCEDURE — 99214 OFFICE O/P EST MOD 30 MIN: CPT | Mod: PBBFAC | Performed by: INTERNAL MEDICINE

## 2024-01-22 NOTE — PROGRESS NOTES
Subjective:       Patient ID: Nora Burger is a 63 y.o. female.    Chief Complaint: Follow-up      HPI  Nora Burger is a 63 y.o. year old female established patient presents for short interval follow up.     Allergic reaction to new estrogen patch.   Symptoms resolved with prednisone / famotidine / benadryl    Upon reapplication of estrogen patch, immediate allergic reaction    Patient has f/u with gyn regarding possible alternatives, however is already entertaining not being back on estrogen patch.     Chronic lower back pain, worse with inactivity, improves with movement. No radicular symptoms, would like to see PT.     Review of Systems   Respiratory:  Negative for shortness of breath.    Cardiovascular:  Negative for chest pain.   Gastrointestinal:  Negative for abdominal distention.   Musculoskeletal:  Positive for back pain.   Skin:  Negative for color change, rash and wound.        No pruritis         Past Medical History:   Diagnosis Date    Abnormal Pap smear of cervix     GERD (gastroesophageal reflux disease)     Hypertension     Hypothyroidism, unspecified         Prior to Admission medications    Medication Sig Start Date End Date Taking? Authorizing Provider   aspirin (ECOTRIN) 81 MG EC tablet Take 81 mg by mouth once daily.    Provider, Historical   cholecalciferol, vitamin D3, (VITAMIN D3) 50 mcg (2,000 unit) Cap capsule Take 2 capsules (4,000 Units total) by mouth once daily. 7/17/23 7/16/24  Charles Shah MD   diltiaZEM (CARDIZEM CD) 240 MG 24 hr capsule Take 1 capsule (240 mg total) by mouth once daily. 2/6/23   Noah Monroy MD   diphenhydrAMINE (BENADRYL ALLERGY) 25 mg tablet Take 1 tablet (25 mg total) by mouth every 6 (six) hours as needed for Insomnia. 1/8/24   Noah Monroy MD   ergocalciferol (VITAMIN D2) 50,000 unit Cap Take 1 capsule (50,000 Units total) by mouth every 7 days. 6/19/23 6/18/24  Melania Austin, NP   estradiol 0.05 mg/24 hr td ptsw (VIVELLE-DOT) 0.05 mg/24 hr  "Place 1 patch onto the skin twice a week. 6/19/23 6/18/24  Melania Austin, NP   ezetimibe (ZETIA) 10 mg tablet Take 1 tablet (10 mg total) by mouth once daily. 5/10/23 5/9/24  Erasmo Evangelista MD   famotidine (PEPCID) 20 MG tablet Take 1 tablet (20 mg total) by mouth 2 (two) times daily. for 10 days 1/8/24 1/18/24  Noah Monroy MD   hydroCHLOROthiazide (HYDRODIURIL) 25 MG tablet Take 1 tablet (25 mg total) by mouth once daily. 2/6/23   Noah Monroy MD   levothyroxine (SYNTHROID) 50 MCG tablet Take 1 tablet (50 mcg total) by mouth before breakfast. 2/6/23   Noah Monroy MD   losartan (COZAAR) 50 MG tablet Take 1 tablet (50 mg total) by mouth once daily. 2/6/23   Noah Monroy MD   omeprazole (PRILOSEC) 20 MG capsule Take 1 capsule (20 mg total) by mouth before breakfast. Best taken 45-60 minutes before your 1st protein meal of the day breakfast 12/8/23 12/7/24  Noah Monroy MD   predniSONE (DELTASONE) 20 MG tablet Take 2 tablets (40 mg total) by mouth once daily. 1/8/24   Noah Monroy MD   progesterone (PROMETRIUM) 100 MG capsule Take 1 capsule (100 mg total) by mouth nightly. 6/19/23 6/18/24  Melania Austin NP        Past medical history, surgical history, and family medical history reviewed and updated as appropriate.    Medications and allergies reviewed.     Objective:          Vitals:    01/22/24 0918   BP: 118/72   BP Location: Right arm   Patient Position: Sitting   Pulse: 71   SpO2: 98%   Weight: 68.4 kg (150 lb 12.7 oz)   Height: 5' 6" (1.676 m)     Body mass index is 24.34 kg/m².  Physical Exam  Constitutional:       Appearance: Normal appearance.   Eyes:      Extraocular Movements: Extraocular movements intact.   Cardiovascular:      Rate and Rhythm: Normal rate.   Pulmonary:      Effort: Pulmonary effort is normal.   Skin:     General: Skin is warm and dry.      Findings: No rash.   Neurological:      General: No focal deficit present.      Mental Status: She is alert and oriented to person, place, " and time.         Lab Results   Component Value Date    WBC 7.28 01/08/2024    HGB 15.3 01/08/2024    HCT 46.7 01/08/2024     01/08/2024    CHOL 164 02/07/2023    TRIG 77 02/07/2023    HDL 63 02/07/2023    ALT 14 01/08/2024    AST 18 01/08/2024     01/08/2024    K 4.2 01/08/2024     01/08/2024    CREATININE 0.8 01/08/2024    BUN 15 01/08/2024    CO2 30 (H) 01/08/2024    TSH 1.493 06/12/2023    HGBA1C 5.3 02/07/2023       Assessment:       1. Follow-up exam    2. Allergic reaction, subsequent encounter    3. Hypothyroidism (acquired)    4. Chronic bilateral low back pain without sciatica          Plan:     Nora was seen today for follow-up.    Diagnoses and all orders for this visit:    Follow-up exam    Allergic reaction, subsequent encounter    Hypothyroidism (acquired)    Chronic bilateral low back pain without sciatica  -     Ambulatory referral/consult to Physical/Occupational Therapy; Future    Allergic reaction resolved  Etiology likely transdermal estradiol patch (Lyllana), was previously on Vivelle Dot without issues.  Chronic lower back pain without radiculopathy, improves with activity. Will refer to PT.     Patient to return to clinic for annual exam after CT abdomen to monitor for peripancreatic lymph node. HM to be discussed at that time.     Follow up in 15 days (on 2/6/2024).    Noah Monroy MD  Internal Medicine / Primary Care  [facility]  1/22/2024

## 2024-02-03 ENCOUNTER — HOSPITAL ENCOUNTER (OUTPATIENT)
Dept: RADIOLOGY | Facility: HOSPITAL | Age: 64
Discharge: HOME | End: 2024-02-03
Attending: INTERNAL MEDICINE
Payer: MEDICARE

## 2024-02-03 DIAGNOSIS — R93.5 ABNORMAL ABDOMINAL CT SCAN: ICD-10-CM

## 2024-02-03 DIAGNOSIS — R59.1 LYMPHADENOPATHY: ICD-10-CM

## 2024-02-03 PROCEDURE — 25500020 PHARM REV CODE 255: Performed by: INTERNAL MEDICINE

## 2024-02-03 PROCEDURE — 74160 CT ABDOMEN W/CONTRAST: CPT | Mod: TC

## 2024-02-03 PROCEDURE — 74160 CT ABDOMEN W/CONTRAST: CPT | Mod: 26,,, | Performed by: STUDENT IN AN ORGANIZED HEALTH CARE EDUCATION/TRAINING PROGRAM

## 2024-02-03 RX ADMIN — IOHEXOL 75 ML: 350 INJECTION, SOLUTION INTRAVENOUS at 09:02

## 2024-02-06 ENCOUNTER — OFFICE VISIT (OUTPATIENT)
Dept: INTERNAL MEDICINE | Facility: CLINIC | Age: 64
End: 2024-02-06
Payer: MEDICARE

## 2024-02-06 VITALS
OXYGEN SATURATION: 97 % | SYSTOLIC BLOOD PRESSURE: 116 MMHG | HEART RATE: 61 BPM | DIASTOLIC BLOOD PRESSURE: 72 MMHG | WEIGHT: 151.44 LBS | HEIGHT: 66 IN | RESPIRATION RATE: 18 BRPM | BODY MASS INDEX: 24.34 KG/M2

## 2024-02-06 DIAGNOSIS — Z00.00 ENCOUNTER FOR ANNUAL PHYSICAL EXAM: Primary | ICD-10-CM

## 2024-02-06 DIAGNOSIS — Z87.891 HISTORY OF TOBACCO ABUSE: ICD-10-CM

## 2024-02-06 DIAGNOSIS — F17.210 CIGARETTE NICOTINE DEPENDENCE WITHOUT COMPLICATION: ICD-10-CM

## 2024-02-06 DIAGNOSIS — I70.0 AORTIC ATHEROSCLEROSIS: ICD-10-CM

## 2024-02-06 DIAGNOSIS — I25.10 ATHEROSCLEROSIS OF NATIVE CORONARY ARTERY OF NATIVE HEART, UNSPECIFIED WHETHER ANGINA PRESENT: ICD-10-CM

## 2024-02-06 DIAGNOSIS — E55.9 VITAMIN D INSUFFICIENCY: ICD-10-CM

## 2024-02-06 DIAGNOSIS — R73.09 BLOOD GLUCOSE ABNORMAL: ICD-10-CM

## 2024-02-06 DIAGNOSIS — K21.9 GASTROESOPHAGEAL REFLUX DISEASE, UNSPECIFIED WHETHER ESOPHAGITIS PRESENT: ICD-10-CM

## 2024-02-06 DIAGNOSIS — Z23 NEED FOR VACCINATION FOR STREP PNEUMONIAE: ICD-10-CM

## 2024-02-06 DIAGNOSIS — J43.9 PULMONARY EMPHYSEMA, UNSPECIFIED EMPHYSEMA TYPE: ICD-10-CM

## 2024-02-06 DIAGNOSIS — E03.9 HYPOTHYROIDISM (ACQUIRED): ICD-10-CM

## 2024-02-06 DIAGNOSIS — Z78.9 STATIN INTOLERANCE: ICD-10-CM

## 2024-02-06 DIAGNOSIS — I49.3 PVC (PREMATURE VENTRICULAR CONTRACTION): ICD-10-CM

## 2024-02-06 DIAGNOSIS — R13.19 ESOPHAGEAL DYSPHAGIA: ICD-10-CM

## 2024-02-06 DIAGNOSIS — I10 PRIMARY HYPERTENSION: ICD-10-CM

## 2024-02-06 PROCEDURE — 99999PBSHW PNEUMOCOCCAL CONJUGATE VACCINE 20-VALENT: Mod: PBBFAC,,,

## 2024-02-06 PROCEDURE — 90677 PCV20 VACCINE IM: CPT | Mod: PBBFAC

## 2024-02-06 PROCEDURE — 99214 OFFICE O/P EST MOD 30 MIN: CPT | Mod: PBBFAC | Performed by: INTERNAL MEDICINE

## 2024-02-06 PROCEDURE — 99999 PR PBB SHADOW E&M-EST. PATIENT-LVL IV: CPT | Mod: PBBFAC,,, | Performed by: INTERNAL MEDICINE

## 2024-02-06 PROCEDURE — 99397 PER PM REEVAL EST PAT 65+ YR: CPT | Mod: GZ,S$PBB,, | Performed by: INTERNAL MEDICINE

## 2024-02-06 RX ORDER — OMEPRAZOLE 20 MG/1
20 CAPSULE, DELAYED RELEASE ORAL
Qty: 90 CAPSULE | Refills: 3 | Status: SHIPPED | OUTPATIENT
Start: 2024-02-06 | End: 2025-02-05

## 2024-02-06 RX ORDER — DILTIAZEM HYDROCHLORIDE 240 MG/1
240 CAPSULE, COATED, EXTENDED RELEASE ORAL DAILY
Qty: 90 CAPSULE | Refills: 3 | Status: SHIPPED | OUTPATIENT
Start: 2024-02-06

## 2024-02-06 RX ORDER — ALBUTEROL SULFATE 90 UG/1
2 AEROSOL, METERED RESPIRATORY (INHALATION) EVERY 6 HOURS PRN
Qty: 18 G | Refills: 5 | Status: SHIPPED | OUTPATIENT
Start: 2024-02-06

## 2024-02-06 RX ORDER — LEVOTHYROXINE SODIUM 50 UG/1
50 TABLET ORAL
Qty: 90 TABLET | Refills: 3 | Status: SHIPPED | OUTPATIENT
Start: 2024-02-06

## 2024-02-06 RX ORDER — LOSARTAN POTASSIUM 50 MG/1
50 TABLET ORAL DAILY
Qty: 90 TABLET | Refills: 3 | Status: SHIPPED | OUTPATIENT
Start: 2024-02-06

## 2024-02-06 RX ORDER — HYDROCHLOROTHIAZIDE 25 MG/1
25 TABLET ORAL DAILY
Qty: 90 TABLET | Refills: 3 | Status: SHIPPED | OUTPATIENT
Start: 2024-02-06

## 2024-02-06 RX ORDER — EZETIMIBE 10 MG/1
10 TABLET ORAL DAILY
Qty: 90 TABLET | Refills: 3 | Status: SHIPPED | OUTPATIENT
Start: 2024-02-06 | End: 2025-02-05

## 2024-02-06 NOTE — PROGRESS NOTES
Subjective:       Patient ID: Nora Burger is a 63 y.o. female.    Chief Complaint: Annual Exam (Annual )      HPI  Nora Burger is a 63 y.o. year old female with HTN, hypothyroidism, mitral valve prolapse, history of tobacco abuse presents for annual exam.   Review of Systems   Constitutional:  Negative for activity change and unexpected weight change.   HENT:  Negative for hearing loss, rhinorrhea and trouble swallowing.    Eyes:  Negative for discharge and visual disturbance.   Respiratory:  Negative for chest tightness and wheezing.    Cardiovascular:  Negative for chest pain and palpitations.   Gastrointestinal:  Negative for blood in stool, constipation, diarrhea and vomiting.   Endocrine: Negative for polydipsia and polyuria.   Genitourinary:  Negative for difficulty urinating, dysuria, hematuria and menstrual problem.   Musculoskeletal:  Positive for arthralgias. Negative for joint swelling and neck pain.   Neurological:  Negative for weakness and headaches.   Psychiatric/Behavioral:  Negative for confusion and dysphoric mood.          Past Medical History:   Diagnosis Date    Abnormal Pap smear of cervix     GERD (gastroesophageal reflux disease)     Hypertension     Hypothyroidism, unspecified         Prior to Admission medications    Medication Sig Start Date End Date Taking? Authorizing Provider   aspirin (ECOTRIN) 81 MG EC tablet Take 81 mg by mouth once daily.    Provider, Historical   cholecalciferol, vitamin D3, (VITAMIN D3) 50 mcg (2,000 unit) Cap capsule Take 2 capsules (4,000 Units total) by mouth once daily. 7/17/23 7/16/24  Charles Shah MD   diltiaZEM (CARDIZEM CD) 240 MG 24 hr capsule Take 1 capsule (240 mg total) by mouth once daily. 2/6/23   Noah Monroy MD   diphenhydrAMINE (BENADRYL ALLERGY) 25 mg tablet Take 1 tablet (25 mg total) by mouth every 6 (six) hours as needed for Insomnia. 1/8/24   Noah Monroy MD   estradiol 0.05 mg/24 hr td ptsw (VIVELLE-DOT) 0.05 mg/24 hr Place 1  "patch onto the skin twice a week. 6/19/23 6/18/24  Melania Austin, NP   ezetimibe (ZETIA) 10 mg tablet Take 1 tablet (10 mg total) by mouth once daily. 5/10/23 5/9/24  Erasmo Evangelista MD   famotidine (PEPCID) 20 MG tablet Take 1 tablet (20 mg total) by mouth 2 (two) times daily. for 10 days 1/8/24 1/18/24  Noah Monroy MD   hydroCHLOROthiazide (HYDRODIURIL) 25 MG tablet Take 1 tablet (25 mg total) by mouth once daily. 2/6/23   Noha Monroy MD   levothyroxine (SYNTHROID) 50 MCG tablet Take 1 tablet (50 mcg total) by mouth before breakfast. 2/6/23   Noah Monroy MD   losartan (COZAAR) 50 MG tablet Take 1 tablet (50 mg total) by mouth once daily. 2/6/23   Noah Monroy MD   omeprazole (PRILOSEC) 20 MG capsule Take 1 capsule (20 mg total) by mouth before breakfast. Best taken 45-60 minutes before your 1st protein meal of the day breakfast 12/8/23 12/7/24  Noah Monroy MD   predniSONE (DELTASONE) 20 MG tablet Take 2 tablets (40 mg total) by mouth once daily. 1/8/24   Noah Monroy MD        Past medical history, surgical history, and family medical history reviewed and updated as appropriate.    Medications and allergies reviewed.     Objective:          Vitals:    02/06/24 1454   BP: 116/72   BP Location: Left arm   Patient Position: Sitting   BP Method: Small (Manual)   Pulse: 61   Resp: 18   SpO2: 97%   Weight: 68.7 kg (151 lb 7.3 oz)   Height: 5' 6" (1.676 m)     Body mass index is 24.45 kg/m².  Physical Exam  Constitutional:       Appearance: She is well-developed.   HENT:      Head: Normocephalic and atraumatic.   Eyes:      Extraocular Movements: Extraocular movements intact.   Cardiovascular:      Rate and Rhythm: Normal rate and regular rhythm.      Heart sounds: Normal heart sounds.   Pulmonary:      Effort: Pulmonary effort is normal. No respiratory distress.      Breath sounds: Normal breath sounds. No wheezing.   Abdominal:      General: Bowel sounds are normal. There is no distension.      Palpations: " Abdomen is soft.      Tenderness: There is no abdominal tenderness.   Musculoskeletal:         General: No tenderness. Normal range of motion.      Cervical back: Normal range of motion.   Skin:     General: Skin is warm and dry.   Neurological:      Mental Status: She is alert and oriented to person, place, and time.      Cranial Nerves: No cranial nerve deficit.      Deep Tendon Reflexes: Reflexes are normal and symmetric.         Lab Results   Component Value Date    WBC 7.28 01/08/2024    HGB 15.3 01/08/2024    HCT 46.7 01/08/2024     01/08/2024    CHOL 164 02/07/2023    TRIG 77 02/07/2023    HDL 63 02/07/2023    ALT 14 01/08/2024    AST 18 01/08/2024     01/08/2024    K 4.2 01/08/2024     01/08/2024    CREATININE 0.8 01/08/2024    BUN 15 01/08/2024    CO2 30 (H) 01/08/2024    TSH 1.493 06/12/2023    HGBA1C 5.3 02/07/2023       Assessment:       1. Encounter for annual physical exam    2. Pulmonary emphysema, unspecified emphysema type    3. Aortic atherosclerosis    4. Primary hypertension    5. PVC (premature ventricular contraction)    6. Gastroesophageal reflux disease, unspecified whether esophagitis present    7. Esophageal dysphagia    8. Vitamin D insufficiency    9. Hypothyroidism (acquired)    10. Atherosclerosis of native coronary artery of native heart, unspecified whether angina present    11. Blood glucose abnormal    12. History of tobacco abuse    13. Cigarette nicotine dependence without complication    14. Statin intolerance    15. Need for vaccination for Strep pneumoniae          Plan:     Nora was seen today for annual exam.    Diagnoses and all orders for this visit:    Encounter for annual physical exam    Pulmonary emphysema, unspecified emphysema type  -     albuterol (VENTOLIN HFA) 90 mcg/actuation inhaler; Inhale 2 puffs into the lungs every 6 (six) hours as needed for Wheezing or Shortness of Breath. Rescue    Aortic atherosclerosis  -     Lipid Panel; Future  -      ezetimibe (ZETIA) 10 mg tablet; Take 1 tablet (10 mg total) by mouth once daily.    Primary hypertension  Comments:  controlled, no changes to current management  Orders:  -     TSH; Future  -     diltiaZEM (CARDIZEM CD) 240 MG 24 hr capsule; Take 1 capsule (240 mg total) by mouth once daily.  -     hydroCHLOROthiazide (HYDRODIURIL) 25 MG tablet; Take 1 tablet (25 mg total) by mouth once daily.  -     losartan (COZAAR) 50 MG tablet; Take 1 tablet (50 mg total) by mouth once daily.    PVC (premature ventricular contraction)  -     diltiaZEM (CARDIZEM CD) 240 MG 24 hr capsule; Take 1 capsule (240 mg total) by mouth once daily.    Gastroesophageal reflux disease, unspecified whether esophagitis present  -     omeprazole (PRILOSEC) 20 MG capsule; Take 1 capsule (20 mg total) by mouth before breakfast. Best taken 45-60 minutes before your 1st protein meal of the day breakfast    Esophageal dysphagia    Vitamin D insufficiency    Hypothyroidism (acquired)  -     levothyroxine (SYNTHROID) 50 MCG tablet; Take 1 tablet (50 mcg total) by mouth before breakfast.    Atherosclerosis of native coronary artery of native heart, unspecified whether angina present  -     Lipid Panel; Future  -     ezetimibe (ZETIA) 10 mg tablet; Take 1 tablet (10 mg total) by mouth once daily.    Blood glucose abnormal  -     HEMOGLOBIN A1C; Future    History of tobacco abuse  -     CT Chest Lung Screening Low Dose; Future    Cigarette nicotine dependence without complication    Statin intolerance    Need for vaccination for Strep pneumoniae  -     (In Office Administered) Pneumococcal Conjugate Vaccine (20 Valent) (IM) (Preferred)    Benign physical examination, no issues identified. Will obtain routine labwork and age appropriate health screenings.     Health maintenance reviewed with patient.     Follow up in about 8 months (around 10/6/2024).    Noah Monroy MD  Internal Medicine / Primary Care  [facility]  2/6/2024

## 2024-02-06 NOTE — PATIENT INSTRUCTIONS
Pneumonia shot today    Schedule CT chest for lung cancer screening  Schedule fasting labwork    Recommendation to also get the shingles vaccination series (two shots, 2-6 months apart)  Recommendation to also get RSV vaccination.     Return to clinic in 8 months or sooner if needed.

## 2024-02-07 ENCOUNTER — LAB VISIT (OUTPATIENT)
Dept: LAB | Facility: HOSPITAL | Age: 64
End: 2024-02-07
Attending: INTERNAL MEDICINE
Payer: MEDICARE

## 2024-02-07 DIAGNOSIS — I10 PRIMARY HYPERTENSION: ICD-10-CM

## 2024-02-07 DIAGNOSIS — I70.0 AORTIC ATHEROSCLEROSIS: ICD-10-CM

## 2024-02-07 DIAGNOSIS — R73.09 BLOOD GLUCOSE ABNORMAL: ICD-10-CM

## 2024-02-07 DIAGNOSIS — I25.10 ATHEROSCLEROSIS OF NATIVE CORONARY ARTERY OF NATIVE HEART, UNSPECIFIED WHETHER ANGINA PRESENT: ICD-10-CM

## 2024-02-07 LAB
CHOLEST SERPL-MCNC: 175 MG/DL (ref 120–199)
CHOLEST/HDLC SERPL: 3.2 {RATIO} (ref 2–5)
ESTIMATED AVG GLUCOSE: 108 MG/DL (ref 68–131)
HBA1C MFR BLD: 5.4 % (ref 4–5.6)
HDLC SERPL-MCNC: 54 MG/DL (ref 40–75)
HDLC SERPL: 30.9 % (ref 20–50)
LDLC SERPL CALC-MCNC: 102.6 MG/DL (ref 63–159)
NONHDLC SERPL-MCNC: 121 MG/DL
TRIGL SERPL-MCNC: 92 MG/DL (ref 30–150)
TSH SERPL DL<=0.005 MIU/L-ACNC: 2.72 UIU/ML (ref 0.4–4)

## 2024-02-07 PROCEDURE — 36415 COLL VENOUS BLD VENIPUNCTURE: CPT | Performed by: INTERNAL MEDICINE

## 2024-02-07 PROCEDURE — 83036 HEMOGLOBIN GLYCOSYLATED A1C: CPT | Performed by: INTERNAL MEDICINE

## 2024-02-07 PROCEDURE — 80061 LIPID PANEL: CPT | Performed by: INTERNAL MEDICINE

## 2024-02-07 PROCEDURE — 84443 ASSAY THYROID STIM HORMONE: CPT | Performed by: INTERNAL MEDICINE

## 2024-02-08 DIAGNOSIS — R91.8 ABNORMAL CT SCAN, LUNG: Primary | ICD-10-CM

## 2024-02-09 ENCOUNTER — TELEPHONE (OUTPATIENT)
Dept: GASTROENTEROLOGY | Facility: CLINIC | Age: 64
End: 2024-02-09
Payer: MEDICARE

## 2024-02-09 ENCOUNTER — OFFICE VISIT (OUTPATIENT)
Dept: PULMONOLOGY | Facility: CLINIC | Age: 64
End: 2024-02-09
Payer: MEDICARE

## 2024-02-09 VITALS
BODY MASS INDEX: 24.06 KG/M2 | SYSTOLIC BLOOD PRESSURE: 116 MMHG | HEART RATE: 71 BPM | HEIGHT: 66 IN | OXYGEN SATURATION: 97 % | WEIGHT: 149.69 LBS | DIASTOLIC BLOOD PRESSURE: 72 MMHG

## 2024-02-09 DIAGNOSIS — G47.33 OSA (OBSTRUCTIVE SLEEP APNEA): Primary | ICD-10-CM

## 2024-02-09 DIAGNOSIS — R91.1 PULMONARY NODULE: ICD-10-CM

## 2024-02-09 DIAGNOSIS — R91.8 ABNORMAL CT SCAN, LUNG: ICD-10-CM

## 2024-02-09 DIAGNOSIS — J43.2 CENTRILOBULAR EMPHYSEMA: ICD-10-CM

## 2024-02-09 PROCEDURE — 99999 PR PBB SHADOW E&M-EST. PATIENT-LVL IV: CPT | Mod: PBBFAC,,, | Performed by: INTERNAL MEDICINE

## 2024-02-09 PROCEDURE — 99214 OFFICE O/P EST MOD 30 MIN: CPT | Mod: PBBFAC | Performed by: INTERNAL MEDICINE

## 2024-02-09 PROCEDURE — 99204 OFFICE O/P NEW MOD 45 MIN: CPT | Mod: S$PBB,,, | Performed by: INTERNAL MEDICINE

## 2024-02-09 RX ORDER — UMECLIDINIUM 62.5 UG/1
62.5 AEROSOL, POWDER ORAL DAILY
Qty: 30 EACH | Refills: 0 | Status: SHIPPED | OUTPATIENT
Start: 2024-02-09 | End: 2024-02-20 | Stop reason: SDUPTHER

## 2024-02-09 NOTE — TELEPHONE ENCOUNTER
----- Message from Charles Shah MD sent at 2/8/2024  6:28 PM CST -----  Criselda please refer Tri to Pulmonary Clinic for evaluation nonspecific lung findings on CT scan referral placed.    Tri I am going to refer you to Pulmonary for nonspecific lung findings please stop smoking if you are still smoking    Your CT scan was read as follows    Impression:     Normal appearing pancreas.  Subcentimeter peripancreatic lymph node, within normal limits.     1.3 cm fat attenuating focus in the distal abdomen, likely represents lipoma.     Similar region of tree-in-bud opacities in the right lower lobe, likely reflective small airways infection/inflammation.     Electronically signed by resident: Hemant San  Date:                                            02/04/2024  Time:                                           15:01     Electronically signed by: Oliver Castellanos  Date:                                            02/04/2024

## 2024-02-09 NOTE — TELEPHONE ENCOUNTER
----- Message from Brittney Jimenez sent at 2/9/2024 10:19 AM CST -----  Regarding: pt advice  Contact: pt [phone]  Pt returning call from missed call. Pls call

## 2024-02-09 NOTE — PROGRESS NOTES
Criselda please refer Tri to Pulmonary Clinic for evaluation nonspecific lung findings on CT scan referral placed.    Tri I am going to refer you to Pulmonary for nonspecific lung findings please stop smoking if you are still smoking    Your CT scan was read as follows    Impression:    Normal appearing pancreas.  Subcentimeter peripancreatic lymph node, within normal limits.    1.3 cm fat attenuating focus in the distal abdomen, likely represents lipoma.    Similar region of tree-in-bud opacities in the right lower lobe, likely reflective small airways infection/inflammation.    Electronically signed by resident: Hemant San  Date:                                            02/04/2024  Time:                                           15:01    Electronically signed by: Oliver Castellanos  Date:                                            02/04/2024

## 2024-02-09 NOTE — PROGRESS NOTES
Nora Burger  was seen as a new patient at the request  Charles Shah MD for the evaluation of  abnormal CT chest.    CHIEF COMPLAINT:  Abnormal Ct Scan      HISTORY OF PRESENT ILLNESS: Nora Burger is a 63 y.o. female  has a past medical history of Abnormal Pap smear of cervix, GERD (gastroesophageal reflux disease), Hypertension, and Hypothyroidism, unspecified.  Patient was followed by Dr. Shah for epigastric pain and pancreatic lymph node.  Discovered to have H. Pylori.  Serial CT of abd to monitor pancreatic lymph node showed rll nodular opacities.  Patient was referred to pulmonary for further inputs.    Patient with chronic non-productive cough.  High x 1 flight stair.   Ok walking flat surface (2 miles without issue).  No fever/chill.  Occasional night sweat.  No weight loss.  No hemoptysis.  No wheezing.  Intermittent chest discomfort that improve with H. Pylori treatment.  intermittent lower back pain.      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)  Exposure to Animals/Pets:  2 dogs  Foreign Travel History:  denied  History of exposures to TB:  denied   Family History of Lung Cancer:  denied   Tobacco:  quit smoking 5/2022;  used to smoke 1ppd since age 14  Childhood history of Lung Disease:  denied  Chest surgery or trauma:  denied      PAST MEDICAL HISTORY:    Active Ambulatory Problems     Diagnosis Date Noted    Hypothyroidism (acquired) 03/30/2022    PVC (premature ventricular contraction) 03/30/2022    Primary hypertension 03/30/2022    Cardiac murmur 03/30/2022    Osteopenia of neck of femur 07/03/2023    Esophageal stricture 07/10/2023    Pelvic floor dysfunction 07/25/2023    Coordination impairment 07/25/2023    Chronic left hip pain 07/25/2023    Coronary artery calcification seen on CT scan 09/08/2023    Centrilobular emphysema 02/06/2024    Aortic atherosclerosis 02/06/2024    TERESA  (obstructive sleep apnea) 02/09/2024    Pulmonary nodule 02/09/2024     Resolved Ambulatory Problems     Diagnosis Date Noted    Cigarette nicotine dependence without complication 03/30/2022     Past Medical History:   Diagnosis Date    Abnormal Pap smear of cervix     GERD (gastroesophageal reflux disease)     Hypertension     Hypothyroidism, unspecified                 PAST SURGICAL HISTORY:    Past Surgical History:   Procedure Laterality Date    AUGMENTATION OF BREAST Bilateral     approx 6 years [facility]    CERVICAL BIOPSY  W/ LOOP ELECTRODE EXCISION      COLONOSCOPY N/A 07/28/2023    Procedure: COLONOSCOPY;  Surgeon: Charles Shah MD;  Location: [facility] (4TH FLR);  Service: Endoscopy;  Laterality: N/A;  from referral from Dr. Noah Monroy/ prep ins on portal - suprep per Pt request (Pt has clinic appt on 7/17/23 w/ DR. Shah, Pt may have EGD added, hold placed to follow colon on this date if needed) - ERW  prep instructions given to pt in clinic -  7/24 - Precall done.    ENDOSCOPIC ULTRASOUND OF UPPER GASTROINTESTINAL TRACT N/A 09/19/2023    Procedure: ULTRASOUND, UPPER GI TRACT, ENDOSCOPIC;  Surgeon: Simone Mckee MD;  Location: [facility] (2ND FLR);  Service: Endoscopy;  Laterality: N/A;  instr portal-tb  9/12- number not working. unable to contact for precall- KL    ESOPHAGOGASTRODUODENOSCOPY N/A 07/28/2023    Procedure: EGD (ESOPHAGOGASTRODUODENOSCOPY);  Surgeon: Charles Shah MD;  Location: [facility] (4TH FLR);  Service: Endoscopy;  Laterality: N/A;    HYSTERECTOMY      age mid 40's or 50's [facility].    OOPHORECTOMY           FAMILY HISTORY:                Family History   Problem Relation Age of Onset    Colon cancer Paternal Grandfather     Cancer Paternal Aunt         lymphoma    Breast cancer Neg Hx     Ovarian cancer Neg Hx     Uterine cancer Neg Hx     Cervical cancer Neg Hx        SOCIAL HISTORY:          Tobacco:   Social History     Tobacco Use   Smoking Status Former     Current packs/day: 0.00    Average packs/day: 1 pack/day for 49.0 years (49.0 ttl pk-yrs)    Types: Cigarettes    Start date: 1973    Quit date: 2022    Years since quittin.7   Smokeless Tobacco Not on file     alcohol use:    Social History     Substance and Sexual Activity   Alcohol Use Yes    Alcohol/week: 1.0 standard drink of alcohol    Types: 1 Cans of beer per week    Comment: occ                   ALLERGIES:    Review of patient's allergies indicates:   Allergen Reactions    Lyllana [estradiol] Hives     Urticaria, anxiety, tongue swelling, difficulty breathing       CURRENT MEDICATIONS:    Current Outpatient Medications   Medication Sig Dispense Refill    albuterol (VENTOLIN HFA) 90 mcg/actuation inhaler Inhale 2 puffs into the lungs every 6 (six) hours as needed for Wheezing or Shortness of Breath. Rescue 18 g 5    aspirin (ECOTRIN) 81 MG EC tablet Take 81 mg by mouth once daily.      cholecalciferol, vitamin D3, (VITAMIN D3) 50 mcg (2,000 unit) Cap capsule Take 2 capsules (4,000 Units total) by mouth once daily. 180 capsule 3    diltiaZEM (CARDIZEM CD) 240 MG 24 hr capsule Take 1 capsule (240 mg total) by mouth once daily. 90 capsule 3    ezetimibe (ZETIA) 10 mg tablet Take 1 tablet (10 mg total) by mouth once daily. 90 tablet 3    hydroCHLOROthiazide (HYDRODIURIL) 25 MG tablet Take 1 tablet (25 mg total) by mouth once daily. 90 tablet 3    levothyroxine (SYNTHROID) 50 MCG tablet Take 1 tablet (50 mcg total) by mouth before breakfast. 90 tablet 3    losartan (COZAAR) 50 MG tablet Take 1 tablet (50 mg total) by mouth once daily. 90 tablet 3    omeprazole (PRILOSEC) 20 MG capsule Take 1 capsule (20 mg total) by mouth before breakfast. Best taken 45-60 minutes before your 1st protein meal of the day breakfast 90 capsule 3    AMANDA 0.05 mg/24 hr Place 1 patch onto the skin twice a week. 8 patch 11    umeclidinium (INCRUSE ELLIPTA) 62.5 mcg/actuation inhalation capsule Inhale 62.5 mcg into the  "lungs once daily. Controller 30 each 0     No current facility-administered medications for this visit.                  REVIEW OF SYSTEMS:     Pulmonary related symptoms as per HPI.  Gen:  no weight loss, no fever, no night sweat  HEENT:  no visual changes, no sore throat, + hearing loss  CV:  per hpi  GI:  no melena, no hematochezia, no diarhea, no constipation.  :  no dysuria, no hematuria, no hesistancy, no dribbling  Neuro:  no syncope, +occasional vertigo, no tinitus  Psych:  No homocide or suicide ideation; no depression.  Endocrine:  No heat or cold intolerance.  Sleep: doing well with cpap  Otherwise, a balance of systems reviewed is negative.          PHYSICAL EXAM:  Vitals:    02/09/24 1301   BP: 116/72   Pulse: 71   SpO2: 97%   Weight: 67.9 kg (149 lb 11.1 oz)   Height: 5' 6" (1.676 m)   PainSc:   4   PainLoc: Back     Body mass index is 24.16 kg/m².     GENERAL:  well develop; no apparent distress  HEENT:  no nasal congestion; no discharge noted; class 45 modified mallampatti.   NECK:  supple; no palpable masses.  CARDIO: regular rate and rhythm  PULM:  clear to auscultation bilaterally; no intercostals retractions; no accessory muscle usage   ABDOMEN:  soft nontender/nondistended.  +bowel sound  EXTREMITIES no cce  NEURO:  CN II-XII intact.  5/5 motor in all extremities.  sensation grossly intact   to light touch.  PSYCH:  normal affect.  Alert and oriented x 4    LABS  Pulmonary Functions Testing Results(personally reviewed):  none  ABG (personally reviewed):  none  CXR (personally reviewed):  none  CT CHEST(personally reviewed):    1/17/23 scattered emphysematous changes right upper lungs.  Tubular branching opacity in rll (4:337).  Similar to 7/17/22 CT imaging.  Mild scoliosis      ASSESSMENT/PLAN  Problem List Items Addressed This Visit       Centrilobular emphysema    Overview     Mild emphysematous changes noted on ct scan.   Quit smoking since 2022  Currently on albuterol.  Will add lama      "    Relevant Medications    umeclidinium (INCRUSE ELLIPTA) 62.5 mcg/actuation inhalation capsule    Other Relevant Orders    Complete PFT with bronchodilator (Completed)    TERESA (obstructive sleep apnea) - Primary    Overview     Ahi of 12  Followed by Dr. Cagle/Remi         Pulmonary nodule    Overview     tubular opacity rll with branching pattern c/w impacted mucous.    Stable from 7/17/22 to 2/38/24.  Stability and configuration c/w benign  Repeat ldct by Dr. Monroy pending.            Other Visit Diagnoses       Abnormal CT scan, lung                  Patient will No follow-ups on file.     CC: Send copy of this note to Charles Shah MD      45 minutes of total time spent on the encounter, which includes face to face time and non-face to face time preparing to see the patient (eg, review of tests), Obtaining and/or reviewing separately obtained history, documenting clinical information in the electronic or other health record, independently interpreting results (not separately reported) and communicating results to the patient/family/caregiver, or Care coordination (not separately reported).

## 2024-02-14 ENCOUNTER — HOSPITAL ENCOUNTER (OUTPATIENT)
Dept: RADIOLOGY | Facility: HOSPITAL | Age: 64
Discharge: HOME | End: 2024-02-14
Attending: INTERNAL MEDICINE
Payer: MEDICARE

## 2024-02-14 DIAGNOSIS — Z87.891 HISTORY OF TOBACCO ABUSE: ICD-10-CM

## 2024-02-14 PROCEDURE — 71271 CT THORAX LUNG CANCER SCR C-: CPT | Mod: TC

## 2024-02-14 PROCEDURE — 71271 CT THORAX LUNG CANCER SCR C-: CPT | Mod: 26,,, | Performed by: RADIOLOGY

## 2024-02-15 ENCOUNTER — HOSPITAL ENCOUNTER (OUTPATIENT)
Dept: PULMONOLOGY | Facility: CLINIC | Age: 64
Discharge: HOME | End: 2024-02-15
Payer: MEDICARE

## 2024-02-15 ENCOUNTER — PATIENT MESSAGE (OUTPATIENT)
Dept: PULMONOLOGY | Facility: CLINIC | Age: 64
End: 2024-02-15
Payer: MEDICARE

## 2024-02-15 DIAGNOSIS — J43.2 CENTRILOBULAR EMPHYSEMA: ICD-10-CM

## 2024-02-15 LAB
DLCO ADJ PRE: 16.49 ML/(MIN*MMHG) (ref 16.9–28.37)
DLCO SINGLE BREATH LLN: 16.9
DLCO SINGLE BREATH PRE REF: 76.8 %
DLCO SINGLE BREATH REF: 22.63
DLCOC SBVA LLN: 3.02
DLCOC SBVA PRE REF: 86.7 %
DLCOC SBVA REF: 4.43
DLCOC SINGLE BREATH LLN: 16.9
DLCOC SINGLE BREATH PRE REF: 72.8 %
DLCOC SINGLE BREATH REF: 22.63
DLCOCSBVAULN: 5.85
DLCOCSINGLEBREATHULN: 28.37
DLCOSINGLEBREATHULN: 28.37
DLCOVA LLN: 3.02
DLCOVA PRE REF: 91.3 %
DLCOVA PRE: 4.05 ML/(MIN*MMHG*L) (ref 3.02–5.85)
DLCOVA REF: 4.43
DLCOVAULN: 5.85
DLVAADJ PRE: 3.84 ML/(MIN*MMHG*L) (ref 3.02–5.85)
ERV LLN: -16449.27
ERV PRE REF: 171.2 %
ERV REF: 0.73
ERVULN: ABNORMAL
FEF 25 75 LLN: 1.01
FEF 25 75 PRE REF: 55.9 %
FEF 25 75 REF: 2.08
FET100 CHG: 13.7 %
FEV05 LLN: 0.97
FEV05 REF: 1.82
FEV1 CHG: 4.2 %
FEV1 FVC LLN: 66
FEV1 FVC PRE REF: 89.1 %
FEV1 FVC REF: 79
FEV1 LLN: 1.79
FEV1 PRE REF: 82 %
FEV1 REF: 2.41
FEV1 VOL CHG: 0.08
FRCPLETH LLN: 1.94
FRCPLETH PREREF: 122.5 %
FRCPLETH REF: 2.76
FRCPLETHULN: 3.59
FVC CHG: 4.4 %
FVC LLN: 2.3
FVC PRE REF: 91.3 %
FVC REF: 3.09
FVC VOL CHG: 0.12
IVC PRE: 2.77 L (ref 2.3–3.93)
IVC SINGLE BREATH LLN: 2.3
IVC SINGLE BREATH PRE REF: 89.5 %
IVC SINGLE BREATH REF: 3.09
IVCSINGLEBREATHULN: 3.93
LLN IC: -16447.88
PEF LLN: 4.39
PEF PRE REF: 105.3 %
PEF REF: 6.15
PHYSICIAN COMMENT: ABNORMAL
POST FEF 25 75: 1.18 L/S (ref 1.01–3.57)
POST FET 100: 7.89 SEC
POST FEV1 FVC: 69.86 % (ref 65.84–89.4)
POST FEV1: 2.06 L (ref 1.79–3.01)
POST FEV5: 1.69 L (ref 0.97–2.68)
POST FVC: 2.95 L (ref 2.3–3.93)
POST PEF: 6.95 L/S (ref 4.39–7.92)
PRE DLCO: 17.38 ML/(MIN*MMHG) (ref 16.9–28.37)
PRE ERV: 1.26 L (ref -16449.27–16450.73)
PRE FEF 25 75: 1.16 L/S (ref 1.01–3.57)
PRE FET 100: 6.94 SEC
PRE FEV05 REF: 88.8 %
PRE FEV1 FVC: 69.98 % (ref 65.84–89.4)
PRE FEV1: 1.98 L (ref 1.79–3.01)
PRE FEV5: 1.62 L (ref 0.97–2.68)
PRE FRC PL: 3.38 L (ref 1.94–3.59)
PRE FVC: 2.83 L (ref 2.3–3.93)
PRE IC: 1.57 L (ref -16447.88–16452.12)
PRE PEF: 6.48 L/S (ref 4.39–7.92)
PRE REF IC: 73.9 %
PRE RV: 2.13 L (ref 1.45–2.6)
PRE TLC: 4.95 L (ref 4.12–6.09)
RAW PRE REF: 71.1 %
RAW PRE: 2.18 CMH2O*S/L (ref 3.06–3.06)
RAW REF: 3.06
REF IC: 2.12
RV LLN: 1.45
RV PRE REF: 104.8 %
RV REF: 2.03
RVTLC LLN: 31
RVTLC PRE REF: 104.6 %
RVTLC PRE: 42.94 % (ref 31.47–50.65)
RVTLC REF: 41
RVTLCULN: 51
RVULN: 2.6
SGAW PRE REF: 115.2 %
SGAW PRE: 0.12 1/(CMH2O*S) (ref 0.1–0.1)
SGAW REF: 0.1
TLC LLN: 4.12
TLC PRE REF: 96.9 %
TLC REF: 5.11
TLC ULN: 6.09
ULN IC: ABNORMAL
VA PRE: 4.29 L (ref 4.96–4.96)
VA SINGLE BREATH LLN: 4.96
VA SINGLE BREATH PRE REF: 86.6 %
VA SINGLE BREATH REF: 4.96
VASINGLEBREATHULN: 4.96
VC LLN: 2.3
VC PRE REF: 91.3 %
VC PRE: 2.83 L (ref 2.3–3.93)
VC REF: 3.09
VC ULN: 3.93

## 2024-02-19 ENCOUNTER — OFFICE VISIT (OUTPATIENT)
Dept: OBSTETRICS AND GYNECOLOGY | Facility: CLINIC | Age: 64
End: 2024-02-19
Payer: MEDICARE

## 2024-02-19 VITALS
SYSTOLIC BLOOD PRESSURE: 138 MMHG | WEIGHT: 151 LBS | DIASTOLIC BLOOD PRESSURE: 77 MMHG | HEART RATE: 68 BPM | BODY MASS INDEX: 24.27 KG/M2 | HEIGHT: 66 IN

## 2024-02-19 DIAGNOSIS — T78.40XD ALLERGIC REACTION, SUBSEQUENT ENCOUNTER: ICD-10-CM

## 2024-02-19 DIAGNOSIS — N95.1 MENOPAUSAL SYMPTOMS: Primary | ICD-10-CM

## 2024-02-19 PROCEDURE — 99213 OFFICE O/P EST LOW 20 MIN: CPT | Mod: S$PBB,,, | Performed by: NURSE PRACTITIONER

## 2024-02-19 PROCEDURE — 99999 PR PBB SHADOW E&M-EST. PATIENT-LVL III: CPT | Mod: PBBFAC,,, | Performed by: NURSE PRACTITIONER

## 2024-02-19 PROCEDURE — 99213 OFFICE O/P EST LOW 20 MIN: CPT | Mod: PBBFAC | Performed by: NURSE PRACTITIONER

## 2024-02-19 RX ORDER — ESTRADIOL 0.05 MG/D
1 PATCH, EXTENDED RELEASE TRANSDERMAL
Qty: 8 PATCH | Refills: 11 | Status: SHIPPED | OUTPATIENT
Start: 2024-02-19 | End: 2024-02-28 | Stop reason: SDUPTHER

## 2024-02-20 RX ORDER — UMECLIDINIUM 62.5 UG/1
62.5 AEROSOL, POWDER ORAL DAILY
Qty: 90 CAPSULE | Refills: 0 | Status: SHIPPED | OUTPATIENT
Start: 2024-02-20 | End: 2024-03-11 | Stop reason: SDUPTHER

## 2024-02-21 ENCOUNTER — CLINICAL SUPPORT (OUTPATIENT)
Dept: REHABILITATION | Facility: HOSPITAL | Age: 64
End: 2024-02-21
Attending: INTERNAL MEDICINE
Payer: MEDICARE

## 2024-02-21 DIAGNOSIS — M54.50 CHRONIC BILATERAL LOW BACK PAIN WITHOUT SCIATICA: ICD-10-CM

## 2024-02-21 DIAGNOSIS — G89.29 CHRONIC BILATERAL LOW BACK PAIN WITHOUT SCIATICA: ICD-10-CM

## 2024-02-21 PROCEDURE — 97112 NEUROMUSCULAR REEDUCATION: CPT | Mod: PO

## 2024-02-21 PROCEDURE — 97161 PT EVAL LOW COMPLEX 20 MIN: CPT | Mod: PO

## 2024-02-21 NOTE — PROGRESS NOTES
[facility]   Physical Therapy Initial Evaluation      Name: Nora Burger  Clinic Number: [id number]    Therapy Diagnosis:   Encounter Diagnosis   Name Primary?    Chronic bilateral low back pain without sciatica         Physician: Noah Monroy MD    Physician Orders: PT Eval and Treat   Medical Diagnosis from Referral: Chronic bilateral low back pain without sciatica   Evaluation Date: 2/21/2024  Authorization Period Expiration: 1/21/2025  Plan of Care Expiration: 4/21/2024  Progress Note Due: 3/21/2024  Date of Surgery: NA  Visit # / Visits authorized: 1/ 1   FOTO: 1/ 3    Precautions: Standard     Time In: 10:03  Time Out: 10:55  Total Billable Time: 53 minutes    Subjective     Date of onset: Chronic back pain     History of current condition - Tri reports: Pt reported that she has difficulty sitting for long periods of time, bending. Laying down is also uncomfortable. She also reports LLE Sx laterally into her upper thigh. It may extend to her L calf.  She has taken anti-inflammatories for this problem in the past.    Falls: none    Imaging: none: none of spine    Prior Therapy: yes - for her neck.  Social History: Pt lives in a 2 story house with 5 steps to enter.  She lives with their spouse  Occupation: retired  Prior Level of Function: no significant change  Current Level of Function: NA    Pain:  Current 3/10, worst 8/10, best 3/10   Location: bilateral back   L LE   Description: Aching  Aggravating Factors: Sitting, Laying, and climbing  Easing Factors:  walking, a back brace and heat    Patients goals: stretch and strengthening her back     Medical History:   Past Medical History:   Diagnosis Date    Abnormal Pap smear of cervix     GERD (gastroesophageal reflux disease)     Hypertension     Hypothyroidism, unspecified        Surgical History:   Nora Burger  has a past surgical history that includes Hysterectomy; Augmentation of breast (Bilateral); Oophorectomy;  Colonoscopy (N/A, 07/28/2023); Esophagogastroduodenoscopy (N/A, 07/28/2023); Endoscopic ultrasound of upper gastrointestinal tract (N/A, 09/19/2023); and Cervical biopsy w/ loop electrode excision.    Medications:   Nora has a current medication list which includes the following prescription(s): albuterol, aspirin, cholecalciferol (vitamin d3), diltiazem, marcello, ezetimibe, hydrochlorothiazide, levothyroxine, losartan, omeprazole, and incruse ellipta.    Allergies:   Review of patient's allergies indicates:   Allergen Reactions    Lyllana [estradiol] Hives     Urticaria, anxiety, tongue swelling, difficulty breathing        Objective      Observation: Pt was able to enter the clinic ambulating independently without an assistive device.     Posture:  L side of pelvis elevated    Lumbar Range of Motion:    Degrees Pain   Flexion 75   Pain        Extension 50   Pain        Left Side Bending 75 Pain        Right Side Bending 90 Pain        Left rotation   nt nt        Right Rotation   nt nt           Hip flexion ROM  R = 120  L = 90    Lower Extremity Strength  Right LE  Left LE    Knee extension: 5/5 Knee extension: 4/5   Knee flexion: 5/5 Knee flexion: 4/5   Hip flexion: 3/5 Hip flexion: 3-/5   Hip extension:  nt Hip extension: nt   Hip abduction: 3/5 Hip abduction: 3-/5   Hip adduction: 4/5 Hip adduction 3/5   Ankle dorsiflexion: 5/5 Ankle dorsiflexion: 4+/5   Ankle plantarflexion: nt Ankle plantarflexion: nt         Special Tests:  -Repeated Flexion: nt  -Repeated Ext: nt  -Piriformis Test: (-)  -Prone Instability Test: nt  -Bridge Test: nt  -OH Squat: nt      Neuro Dynamic Testing:    Sciatic nerve:      SLR: R = (+)         L = (-)       Femoral Nerve:    Femoral nerve test: (-)      Joint Mobility: L hip hypomobile    Palpation: R flank     Sensation: intact    Flexibility:    Ely's test: R = nt degrees ; L = nt degrees   Popliteal Angle: R = 40 degrees ; L = nt degrees   Kenneth's test: R = nt ; L = nt   Oliver  test: R = nt ; L = nt    Intake Outcome Measure for FOTO Lumbar spine Survey    Therapist reviewed FOTO scores for Nora Burger on 2/21/2024.   FOTO report - see Media section or FOTO account episode details.    Intake Score: 60%         Treatment     Total Treatment time (time-based codes) separate from Evaluation: 23 minutes     Tri received the treatments listed below:      neuromuscular re-education activities to improve: Balance, Coordination, Kinesthetic, Proprioception, and Posture for 23 minutes. The following activities were included:  Abdominal bracing  Gluteal sets  B hip adduction 10 x 2  Hook lying knee fallout    Patient Education and Home Exercises     Education provided:   - Role of Physical Therapy    Written Home Exercises Provided: yes. Exercises were reviewed and Tri was able to demonstrate them prior to the end of the session.  Tri demonstrated good  understanding of the education provided. See EMR under Patient Instructions for exercises provided during therapy sessions.    Assessment     Nora is a 63 y.o. female referred to outpatient Physical Therapy with a medical diagnosis of Chronic bilateral low back pain without sciatica . Patient presents with c/o chronic low back and L LE S extending to the lower leg. She exhibits decreased strength in the L LE compared to the R and a mild scoliosis    Patient prognosis is Good.   Patient will benefit from skilled outpatient Physical Therapy to address the deficits stated above and in the chart below, provide patient /family education, and to maximize patientt's level of independence.     Plan of care discussed with patient: Yes  Patient's spiritual, cultural and educational needs considered and patient is agreeable to the plan of care and goals as stated below:     Anticipated Barriers for therapy: compliance and scheduling    Medical Necessity is demonstrated by the following  History  Co-morbidities and personal factors that may impact  the plan of care [] LOW: no personal factors / co-morbidities  [x] MODERATE: 1-2 personal factors / co-morbidities  [] HIGH: 3+ personal factors / co-morbidities    Moderate / High Support Documentation:   Co-morbidities affecting plan of care: Scoliosis    Personal Factors:   coping style     Examination  Body Structures and Functions, activity limitations and participation restrictions that may impact the plan of care [x] LOW: addressing 1-2 elements  [] MODERATE: 3+ elements  [] HIGH: 4+ elements (please support below)    Moderate / High Support Documentation: NA     Clinical Presentation [x] LOW: stable  [] MODERATE: Evolving  [] HIGH: Unstable     Decision Making/ Complexity Score: low       Goals:  Short Term Goals: 3 weeks   Pt will be instructed in an exercise program to address functional deficits related to low back and L LE Sx  Improve L hip flexion ROM to >/= 100 degrees  Pt will improve her ability to engage her abdominal and gluteal muscle to assist in supporting her lumbar spine.  Pt will report a decrease in her pain at worse to </= 6/10      Long Term Goals: 6 weeks   Pt will be independent in a HEP to assist in managing her low back and L LE Sx.   Improve L hip flexion ROM to >/= 105 degrees  Pt will report improved functional ability through an improved score on the Lumbar Spine FOTO survey.  Plan     Plan of care Certification: 2/21/2024 to 4/21/2024.    Outpatient Physical Therapy 2 times weekly for 6 weeks to include the following interventions: Cervical/Lumbar Traction, Electrical Stimulation as indicated, Gait Training, Manual Therapy, Moist Heat/ Ice, Neuromuscular Re-ed, Orthotic Management and Training, Patient Education, Self Care, Therapeutic Activities, Therapeutic Exercise, and dry needling .     Boris Vásquez, PT        Physician's Signature: _________________________________________ Date: ________________

## 2024-02-22 NOTE — PATIENT INSTRUCTIONS
HOME EXERCISE PROGRAM  Created by Aryan Vásquez  Feb 21st, 2024  View videos at [email or web address]        TRANSVERSUS ABDOMINUS TRAINING - BRACING IN SITTING    This is to be performed while being seated.    Press your finger tips into your relaxed abdomen lateral of your navel. Next, tighten and brace your abdomen so that the muscles push your finger tips away from the center of your body. Hold and then relax and repeat.    Video # [id number] Repeat 10 Times   Hold 5 Seconds   Complete 1 Set   Perform 6 Times a Day          GLUTEAL SET - SUPINE    While lying on your back, squeeze your buttocks and hold. Repeat. Repeat 10 Times   Hold 5 Seconds   Complete 1 Set   Perform 6 Times a Day          HIP ADDUCTION SQUEEZE - SUPINE - ISOMETRIC ADDUCTORS    Place ball, rolled up towel or pillow between your knees and press your knees together so that you squeeze the object firmly. Hold and then release and repeat.    Video # [id number] Repeat 10 Times   Hold 3 Seconds   Complete 3 Sets   Perform 1 Times a Day          Bent knee fall out with band    Lay on your back with band around above your knees and knees bent. Keeping one leg still, push other knee out to the side and back. Try not to let the band pull your stationary leg across your body. Repeat 10 Times   Hold 2 Seconds   Complete 3 Sets   Perform 1 Times a Day

## 2024-02-23 PROBLEM — G89.29 CHRONIC BILATERAL LOW BACK PAIN WITHOUT SCIATICA: Status: ACTIVE | Noted: 2024-02-23

## 2024-02-23 PROBLEM — M54.50 CHRONIC BILATERAL LOW BACK PAIN WITHOUT SCIATICA: Status: ACTIVE | Noted: 2024-02-23

## 2024-02-24 NOTE — PLAN OF CARE
[facility]   Physical Therapy Initial Evaluation      Name: Nora Burger  Clinic Number: [id number]    Therapy Diagnosis:   Encounter Diagnosis   Name Primary?    Chronic bilateral low back pain without sciatica         Physician: Noah Monroy MD    Physician Orders: PT Eval and Treat   Medical Diagnosis from Referral: Chronic bilateral low back pain without sciatica   Evaluation Date: 2/21/2024  Authorization Period Expiration: 1/21/2025  Plan of Care Expiration: 4/21/2024  Progress Note Due: 3/21/2024  Date of Surgery: NA  Visit # / Visits authorized: 1/ 1   FOTO: 1/ 3    Precautions: Standard     Time In: 10:03  Time Out: 10:55  Total Billable Time: 53 minutes    Subjective     Date of onset: Chronic back pain     History of current condition - Tri reports: Pt reported that she has difficulty sitting for long periods of time, bending. Laying down is also uncomfortable. She also reports LLE Sx laterally into her upper thigh. It may extend to her L calf.  She has taken anti-inflammatories for this problem in the past.    Falls: none    Imaging: none: none of spine    Prior Therapy: yes - for her neck.  Social History: Pt lives in a 2 story house with 5 steps to enter.  She lives with their spouse  Occupation: retired  Prior Level of Function: no significant change  Current Level of Function: NA    Pain:  Current 3/10, worst 8/10, best 3/10   Location: bilateral back  L LE   Description: Aching  Aggravating Factors: Sitting, Laying, and climbing  Easing Factors: walking, a back brace and heat    Patients goals: stretch and strengthening her back     Medical History:   Past Medical History:   Diagnosis Date    Abnormal Pap smear of cervix     GERD (gastroesophageal reflux disease)     Hypertension     Hypothyroidism, unspecified        Surgical History:   Nora Burger  has a past surgical history that includes Hysterectomy; Augmentation of breast (Bilateral); Oophorectomy;  Colonoscopy (N/A, 07/28/2023); Esophagogastroduodenoscopy (N/A, 07/28/2023); Endoscopic ultrasound of upper gastrointestinal tract (N/A, 09/19/2023); and Cervical biopsy w/ loop electrode excision.    Medications:   Nora has a current medication list which includes the following prescription(s): albuterol, aspirin, cholecalciferol (vitamin d3), diltiazem, marcello, ezetimibe, hydrochlorothiazide, levothyroxine, losartan, omeprazole, and incruse ellipta.    Allergies:   Review of patient's allergies indicates:   Allergen Reactions    Lyllana [estradiol] Hives     Urticaria, anxiety, tongue swelling, difficulty breathing        Objective      Observation: Pt was able to enter the clinic ambulating independently without an assistive device.     Posture:  L side of pelvis elevated    Lumbar Range of Motion:    Degrees Pain   Flexion 75   Pain        Extension 50   Pain        Left Side Bending 75 Pain        Right Side Bending 90 Pain        Left rotation   nt nt        Right Rotation   nt nt           Hip flexion ROM  R = 120  L = 90    Lower Extremity Strength  Right LE  Left LE    Knee extension: 5/5 Knee extension: 4/5   Knee flexion: 5/5 Knee flexion: 4/5   Hip flexion: 3/5 Hip flexion: 3-/5   Hip extension:  nt Hip extension: nt   Hip abduction: 3/5 Hip abduction: 3-/5   Hip adduction: 4/5 Hip adduction 3/5   Ankle dorsiflexion: 5/5 Ankle dorsiflexion: 4+/5   Ankle plantarflexion: nt Ankle plantarflexion: nt         Special Tests:  -Repeated Flexion: nt  -Repeated Ext: nt  -Piriformis Test: (-)  -Prone Instability Test: nt  -Bridge Test: nt  -OH Squat: nt      Neuro Dynamic Testing:    Sciatic nerve:      SLR: R = (+)         L = (-)       Femoral Nerve:    Femoral nerve test: (-)      Joint Mobility: L hip hypomobile    Palpation: R flank     Sensation: intact    Flexibility:    Ely's test: R = nt degrees ; L = nt degrees   Popliteal Angle: R = 40 degrees ; L = nt degrees   Kenneth's test: R = nt ; L = nt   Oliver  test: R = nt ; L = nt    Intake Outcome Measure for FOTO Lumbar spine Survey    Therapist reviewed FOTO scores for Nora Burger on 2/21/2024.   FOTO report - see Media section or FOTO account episode details.    Intake Score: 60%         Treatment     Total Treatment time (time-based codes) separate from Evaluation: 23 minutes     Tri received the treatments listed below:      neuromuscular re-education activities to improve: Balance, Coordination, Kinesthetic, Proprioception, and Posture for 23 minutes. The following activities were included:  Abdominal bracing  Gluteal sets  B hip adduction 10 x 2  Hook lying knee fallout    Patient Education and Home Exercises     Education provided:   - Role of Physical Therapy    Written Home Exercises Provided: yes. Exercises were reviewed and Tri was able to demonstrate them prior to the end of the session.  Tri demonstrated good  understanding of the education provided. See EMR under Patient Instructions for exercises provided during therapy sessions.    Assessment     Nora is a 63 y.o. female referred to outpatient Physical Therapy with a medical diagnosis of Chronic bilateral low back pain without sciatica . Patient presents with c/o chronic low back and L LE S extending to the lower leg. She exhibits decreased strength in the L LE compared to the R and a mild scoliosis    Patient prognosis is Good.   Patient will benefit from skilled outpatient Physical Therapy to address the deficits stated above and in the chart below, provide patient /family education, and to maximize patientt's level of independence.     Plan of care discussed with patient: Yes  Patient's spiritual, cultural and educational needs considered and patient is agreeable to the plan of care and goals as stated below:     Anticipated Barriers for therapy: compliance and scheduling    Medical Necessity is demonstrated by the following  History  Co-morbidities and personal factors that may impact  the plan of care [] LOW: no personal factors / co-morbidities  [x] MODERATE: 1-2 personal factors / co-morbidities  [] HIGH: 3+ personal factors / co-morbidities    Moderate / High Support Documentation:   Co-morbidities affecting plan of care: Scoliosis    Personal Factors:   coping style     Examination  Body Structures and Functions, activity limitations and participation restrictions that may impact the plan of care [x] LOW: addressing 1-2 elements  [] MODERATE: 3+ elements  [] HIGH: 4+ elements (please support below)    Moderate / High Support Documentation: NA     Clinical Presentation [x] LOW: stable  [] MODERATE: Evolving  [] HIGH: Unstable     Decision Making/ Complexity Score: low       Goals:  Short Term Goals: 3 weeks   Pt will be instructed in an exercise program to address functional deficits related to low back and L LE Sx  Improve L hip flexion ROM to >/= 100 degrees  Pt will improve her ability to engage her abdominal and gluteal muscle to assist in supporting her lumbar spine.  Pt will report a decrease in her pain at worse to </= 6/10      Long Term Goals: 6 weeks   Pt will be independent in a HEP to assist in managing her low back and L LE Sx.   Improve L hip flexion ROM to >/= 105 degrees  Pt will report improved functional ability through an improved score on the Lumbar Spine FOTO survey.  Plan     Plan of care Certification: 2/21/2024 to 4/21/2024.    Outpatient Physical Therapy 2 times weekly for 6 weeks to include the following interventions: Cervical/Lumbar Traction, Electrical Stimulation as indicated, Gait Training, Manual Therapy, Moist Heat/ Ice, Neuromuscular Re-ed, Orthotic Management and Training, Patient Education, Self Care, Therapeutic Activities, Therapeutic Exercise, and dry needling.     Boris Vásquez, PT        Physician's Signature: _________________________________________ Date: ________________

## 2024-02-27 ENCOUNTER — PATIENT MESSAGE (OUTPATIENT)
Dept: OBSTETRICS AND GYNECOLOGY | Facility: CLINIC | Age: 64
End: 2024-02-27
Payer: MEDICARE

## 2024-02-27 DIAGNOSIS — Z00.00 ENCOUNTER FOR MEDICARE ANNUAL WELLNESS EXAM: ICD-10-CM

## 2024-02-27 NOTE — PROGRESS NOTES
Subjective:      Nora Burger is a 63 y.o. female who is here for follow-up of hormone replacement therapy. Recent skin reaction after placement of Estradiol patch. Reporting is was a generic formulation of Estradiol patch and her first time receiving generic prescription. No issue in the past with Vivelle Dot or Nayla TD patch. Resolution of the rash to skin with 1-2 days of patch removal. No reports additional allergic type symptoms.    She was initially seen by PCP following the skin reaction.    Hospital Outpatient Visit on 02/15/2024   Component Date Value Ref Range Status    Physician Comment 02/15/2024    Final                    Value:Spirometry is normal. Spirometry remains unimproved following bronchodilator. Lung volume determination is normal. Airway mechanics show normal airway resistance and conductance. DLCO is normal.   Â   Notes: The failure to demonstrate improvement in spirometry does not preclude a clinical response to a trial of bronchodilators.       Post FVC 02/15/2024 2.95  2.30 - 3.93 L Final    Post FEV5 02/15/2024 1.69  0.97 - 2.68 L Final    Post FEV1 02/15/2024 2.06  1.79 - 3.01 L Final    Post FEV1 FVC 02/15/2024 69.86  65.84 - 89.40 % Final    Post FEF 25 75 02/15/2024 1.18  1.01 - 3.57 L/s Final    Post PEF 02/15/2024 6.95  4.39 - 7.92 L/s Final    Post  02/15/2024 7.89  sec Final    Pre DLCO 02/15/2024 17.38  16.90 - 28.37 ml/(min*mmHg) Final    DLCO ADJ PRE 02/15/2024 16.49 (L)  16.90 - 28.37 ml/(min*mmHg) Final    DLCOVA PRE 02/15/2024 4.05  3.02 - 5.85 ml/(min*mmHg*L) Final    DLVAAdj PRE 02/15/2024 3.84  3.02 - 5.85 ml/(min*mmHg*L) Final    VA PRE 02/15/2024 4.29 (L)  4.96 - 4.96 L Final    IVC PRE 02/15/2024 2.77  2.30 - 3.93 L Final    Pre TLC 02/15/2024 4.95  4.12 - 6.09 L Final    VC PRE 02/15/2024 2.83  2.30 - 3.93 L Final    PRE IC 02/15/2024 1.57  -70711.88 - 18109.12 L Final    Pre FRC PL 02/15/2024 3.38  1.94 - 3.59 L Final    Pre ERV 02/15/2024 1.26   -38430.27 - 15355.73 L Final    Pre RV 02/15/2024 2.13  1.45 - 2.60 L Final    RVTLC PRE 02/15/2024 42.94  31.47 - 50.65 % Final    Raw PRE 02/15/2024 2.18 (L)  3.06 - 3.06 cmH2O*s/L Final    sGaw PRE 02/15/2024 0.12 (H)  0.10 - 0.10 1/(cmH2O*s) Final    Pre FVC 02/15/2024 2.83  2.30 - 3.93 L Final    PRE FEV5 02/15/2024 1.62  0.97 - 2.68 L Final    Pre FEV1 02/15/2024 1.98  1.79 - 3.01 L Final    Pre FEV1 FVC 02/15/2024 69.98  65.84 - 89.40 % Final    Pre FEF 25 75 02/15/2024 1.16  1.01 - 3.57 L/s Final    Pre PEF 02/15/2024 6.48  4.39 - 7.92 L/s Final    Pre  02/15/2024 6.94  sec Final    FVC Ref 02/15/2024 3.09   Final    FVC LLN 02/15/2024 2.30   Final    FVC Pre Ref 02/15/2024 91.3  % Final    FEV05 REF 02/15/2024 1.82   Final    FEV05 LLN 02/15/2024 0.97   Final    PRE FEV05 REF 02/15/2024 88.8  % Final    FEV1 Ref 02/15/2024 2.41   Final    FEV1 LLN 02/15/2024 1.79   Final    FEV1 Pre Ref 02/15/2024 82.0  % Final    FEV1 FVC Ref 02/15/2024 79   Final    FEV1 FVC LLN 02/15/2024 66   Final    FEV1 FVC Pre Ref 02/15/2024 89.1  % Final    FEF 25 75 Ref 02/15/2024 2.08   Final    FEF 25 75 LLN 02/15/2024 1.01   Final    FEF 25 75 Pre Ref 02/15/2024 55.9  % Final    PEF Ref 02/15/2024 6.15   Final    PEF LLN 02/15/2024 4.39   Final    PEF Pre Ref 02/15/2024 105.3  % Final    FVC VOL CHG 02/15/2024 0.12   Final    FEV1 VOL CHG 02/15/2024 0.08   Final    FVC Chg 02/15/2024 4.4  % Final    FEV1 Chg 02/15/2024 4.2  % Final    KHM753 Chg 02/15/2024 13.7  % Final    TLC Ref 02/15/2024 5.11   Final    TLC LLN 02/15/2024 4.12   Final    TLC ULN 02/15/2024 6.09   Final    TLC Pre Ref 02/15/2024 96.9  % Final    VC Ref 02/15/2024 3.09   Final    VC LLN 02/15/2024 2.30   Final    VC ULN 02/15/2024 3.93   Final    VC Pre Ref 02/15/2024 91.3  % Final    REF IC 02/15/2024 2.12   Final    LLN IC 02/15/2024 -01726.88   Final    ULN IC 02/15/2024 98248.12   Final    PRE REF IC 02/15/2024 73.9  % Final    FRCpleth Ref  02/15/2024 2.76   Final    FRCpleth LLN 02/15/2024 1.94   Final    FRC PLETH ULN 02/15/2024 3.59   Final    FRCpleth PreRef 02/15/2024 122.5  % Final    ERV Ref 02/15/2024 0.73   Final    ERV LLN 02/15/2024 -18419.27   Final    ERV ULN 02/15/2024 66301.73   Final    ERV Pre Ref 02/15/2024 171.2  % Final    RV Ref 02/15/2024 2.03   Final    RV LLN 02/15/2024 1.45   Final    RV ULN 02/15/2024 2.60   Final    RV Pre Ref 02/15/2024 104.8  % Final    RVTLC Ref 02/15/2024 41   Final    RVTLC LLN 02/15/2024 31   Final    RV TLC ULN 02/15/2024 51   Final    RVTLC Pre Ref 02/15/2024 104.6  % Final    Raw Ref 02/15/2024 3.06   Final    Raw Pre Ref 02/15/2024 71.1  % Final    sGaw Ref 02/15/2024 0.10   Final    sGaw Pre Ref 02/15/2024 115.2  % Final    DLCO Single Breath Ref 02/15/2024 22.63   Final    DLCO Single Breath LLN 02/15/2024 16.90   Final    DLCO SINGLEBREATH ULN 02/15/2024 28.37   Final    DLCO Single Breath Pre Ref 02/15/2024 76.8  % Final    DLCOc Single Breath Ref 02/15/2024 22.63   Final    DLCOc Single Breath LLN 02/15/2024 16.90   Final    DLCOC SINGLEBREATH ULN 02/15/2024 28.37   Final    DLCOc Single Breath Pre Ref 02/15/2024 72.8  % Final    DLCOVA Ref 02/15/2024 4.43   Final    DLCOVA LLN 02/15/2024 3.02   Final    DLCOVA ULN 02/15/2024 5.85   Final    DLCOVA Pre Ref 02/15/2024 91.3  % Final    DLCOc SBVA Ref 02/15/2024 4.43   Final    DLCOc SBVA LLN 02/15/2024 3.02   Final    DLCOCSBVA ULN 02/15/2024 5.85   Final    DLCOc SBVA Pre Ref 02/15/2024 86.7  % Final    VA Single Breath Ref 02/15/2024 4.96   Final    VA Single Breath LLN 02/15/2024 4.96   Final    VA SINGLEBREATH ULN 02/15/2024 4.96   Final    VA Single Breath Pre Ref 02/15/2024 86.6  % Final    IVC Single Breath Ref 02/15/2024 3.09   Final    IVC Single Breath LLN 02/15/2024 2.30   Final    IVC SINGLEBREATH ULN 02/15/2024 3.93   Final    IVC Single Breath Pre Ref 02/15/2024 89.5  % Final   Lab Visit on 02/07/2024   Component Date Value Ref  Range Status    Cholesterol 02/07/2024 175  120 - 199 mg/dL Final    Triglycerides 02/07/2024 92  30 - 150 mg/dL Final    HDL 02/07/2024 54  40 - 75 mg/dL Final    LDL Cholesterol 02/07/2024 102.6  63.0 - 159.0 mg/dL Final    HDL/Cholesterol Ratio 02/07/2024 30.9  20.0 - 50.0 % Final    Total Cholesterol/HDL Ratio 02/07/2024 3.2  2.0 - 5.0 Final    Non-HDL Cholesterol 02/07/2024 121  mg/dL Final    TSH 02/07/2024 2.724  0.400 - 4.000 uIU/mL Final    Hemoglobin A1C 02/07/2024 5.4  4.0 - 5.6 % Final    Estimated Avg Glucose 02/07/2024 108  68 - 131 mg/dL Final   Office Visit on 01/08/2024   Component Date Value Ref Range Status    POC Rapid COVID 01/08/2024 Negative  Negative Final     Acceptable 01/08/2024 Yes   Final    POC Molecular Influenza A Ag 01/08/2024 Negative  Negative, Not Reported Final    POC Molecular Influenza B Ag 01/08/2024 Negative  Negative, Not Reported Final     Acceptable 01/08/2024 Yes   Final   Lab Visit on 01/08/2024   Component Date Value Ref Range Status    WBC 01/08/2024 7.28  3.90 - 12.70 K/uL Final    RBC 01/08/2024 5.20  4.00 - 5.40 M/uL Final    Hemoglobin 01/08/2024 15.3  12.0 - 16.0 g/dL Final    Hematocrit 01/08/2024 46.7  37.0 - 48.5 % Final    MCV 01/08/2024 90  82 - 98 fL Final    MCH 01/08/2024 29.4  27.0 - 31.0 pg Final    MCHC 01/08/2024 32.8  32.0 - 36.0 g/dL Final    RDW 01/08/2024 12.7  11.5 - 14.5 % Final    Platelets 01/08/2024 284  150 - 450 K/uL Final    MPV 01/08/2024 10.8  9.2 - 12.9 fL Final    Immature Granulocytes 01/08/2024 0.4  0.0 - 0.5 % Final    Gran # (ANC) 01/08/2024 5.1  1.8 - 7.7 K/uL Final    Immature Grans (Abs) 01/08/2024 0.03  0.00 - 0.04 K/uL Final    Lymph # 01/08/2024 1.8  1.0 - 4.8 K/uL Final    Mono # 01/08/2024 0.4  0.3 - 1.0 K/uL Final    Eos # 01/08/2024 0.1  0.0 - 0.5 K/uL Final    Baso # 01/08/2024 0.01  0.00 - 0.20 K/uL Final    nRBC 01/08/2024 0  0 /100 WBC Final    Gran % 01/08/2024 69.9  38.0 - 73.0 % Final     Lymph % 01/08/2024 24.0  18.0 - 48.0 % Final    Mono % 01/08/2024 4.9  4.0 - 15.0 % Final    Eosinophil % 01/08/2024 0.7  0.0 - 8.0 % Final    Basophil % 01/08/2024 0.1  0.0 - 1.9 % Final    Differential Method 01/08/2024 Automated   Final    Sodium 01/08/2024 144  136 - 145 mmol/L Final    Potassium 01/08/2024 4.2  3.5 - 5.1 mmol/L Final    Chloride 01/08/2024 106  95 - 110 mmol/L Final    CO2 01/08/2024 30 (H)  23 - 29 mmol/L Final    Glucose 01/08/2024 91  70 - 110 mg/dL Final    BUN 01/08/2024 15  8 - 23 mg/dL Final    Creatinine 01/08/2024 0.8  0.5 - 1.4 mg/dL Final    Calcium 01/08/2024 9.7  8.7 - 10.5 mg/dL Final    Total Protein 01/08/2024 7.3  6.0 - 8.4 g/dL Final    Albumin 01/08/2024 4.3  3.5 - 5.2 g/dL Final    Total Bilirubin 01/08/2024 0.4  0.1 - 1.0 mg/dL Final    Alkaline Phosphatase 01/08/2024 66  55 - 135 U/L Final    AST 01/08/2024 18  10 - 40 U/L Final    ALT 01/08/2024 14  10 - 44 U/L Final    eGFR 01/08/2024 >60.0  >60 mL/min/1.73 m^2 Final    Anion Gap 01/08/2024 8  8 - 16 mmol/L Final    Sed Rate 01/08/2024 <2  0 - 36 mm/Hr Final    CRP 01/08/2024 7.0  0.0 - 8.2 mg/L Final   Lab Visit on 11/29/2023   Component Date Value Ref Range Status    H. Pylori Antigen, Stool 11/29/2023 NOT DETECTED   Final       Past Medical History:   Diagnosis Date    Abnormal Pap smear of cervix     GERD (gastroesophageal reflux disease)     Hypertension     Hypothyroidism, unspecified      Past Surgical History:   Procedure Laterality Date    AUGMENTATION OF BREAST Bilateral     approx 6 years siicone    CERVICAL BIOPSY  W/ LOOP ELECTRODE EXCISION      COLONOSCOPY N/A 07/28/2023    Procedure: COLONOSCOPY;  Surgeon: Charles Shah MD;  Location: [facility] ([address] [place] [address]);  Service: Endoscopy;  Laterality: N/A;  from referral from Dr. Noah Monroy/ prep ins on portal - suprep per Pt request (Pt has clinic appt on 7/17/23 w/ DR. Shah, Pt may have EGD added, hold placed to follow colon on this date if needed) -  ERW  prep instructions given to pt in clinic -Dennis   - Precall done.    ENDOSCOPIC ULTRASOUND OF UPPER GASTROINTESTINAL TRACT N/A 2023    Procedure: ULTRASOUND, UPPER GI TRACT, ENDOSCOPIC;  Surgeon: Simone Mckee MD;  Location: [facility] ENDO (2ND FLR);  Service: Endoscopy;  Laterality: N/A;  instr portal-tb  - number not working. unable to contact for precall- KL    ESOPHAGOGASTRODUODENOSCOPY N/A 2023    Procedure: EGD (ESOPHAGOGASTRODUODENOSCOPY);  Surgeon: Charles Shah MD;  Location: [facility] ENDO (4TH FLR);  Service: Endoscopy;  Laterality: N/A;    HYSTERECTOMY      age mid 40's or 50's DUB, endo.    OOPHORECTOMY       Social History     Tobacco Use    Smoking status: Former     Current packs/day: 0.00     Average packs/day: 1 pack/day for 49.0 years (49.0 ttl pk-yrs)     Types: Cigarettes     Start date: 1973     Quit date: 2022     Years since quittin.8   Substance Use Topics    Alcohol use: Yes     Alcohol/week: 1.0 standard drink of alcohol     Types: 1 Cans of beer per week     Comment: occ    Drug use: Not Currently     Family History   Problem Relation Age of Onset    Colon cancer Paternal Grandfather     Cancer Paternal Aunt         lymphoma    Breast cancer Neg Hx     Ovarian cancer Neg Hx     Uterine cancer Neg Hx     Cervical cancer Neg Hx      OB History    Para Term  AB Living   3 2 2   1     SAB IAB Ectopic Multiple Live Births   1              # Outcome Date GA Lbr Eitan/2nd Weight Sex Delivery Anes PTL Lv   3 SAB            2 Term      CS-LTranv      1 Term      CS-LTranv          Current Outpatient Medications:     albuterol (VENTOLIN HFA) 90 mcg/actuation inhaler, Inhale 2 puffs into the lungs every 6 (six) hours as needed for Wheezing or Shortness of Breath. Rescue, Disp: 18 g, Rfl: 5    aspirin (ECOTRIN) 81 MG EC tablet, Take 81 mg by mouth once daily., Disp: , Rfl:     cholecalciferol, vitamin D3, (VITAMIN D3) 50 mcg (2,000 unit) Cap capsule, Take  "2 capsules (4,000 Units total) by mouth once daily., Disp: 180 capsule, Rfl: 3    diltiaZEM (CARDIZEM CD) 240 MG 24 hr capsule, Take 1 capsule (240 mg total) by mouth once daily., Disp: 90 capsule, Rfl: 3    ezetimibe (ZETIA) 10 mg tablet, Take 1 tablet (10 mg total) by mouth once daily., Disp: 90 tablet, Rfl: 3    hydroCHLOROthiazide (HYDRODIURIL) 25 MG tablet, Take 1 tablet (25 mg total) by mouth once daily., Disp: 90 tablet, Rfl: 3    levothyroxine (SYNTHROID) 50 MCG tablet, Take 1 tablet (50 mcg total) by mouth before breakfast., Disp: 90 tablet, Rfl: 3    losartan (COZAAR) 50 MG tablet, Take 1 tablet (50 mg total) by mouth once daily., Disp: 90 tablet, Rfl: 3    omeprazole (PRILOSEC) 20 MG capsule, Take 1 capsule (20 mg total) by mouth before breakfast. Best taken 45-60 minutes before your 1st protein meal of the day breakfast, Disp: 90 capsule, Rfl: 3    AMANDA 0.05 mg/24 hr, Place 1 patch onto the skin twice a week., Disp: 8 patch, Rfl: 11    umeclidinium (INCRUSE ELLIPTA) 62.5 mcg/actuation inhalation capsule, Inhale 62.5 mcg into the lungs once daily. Controller, Disp: 90 capsule, Rfl: 0    Vitals:    02/19/24 1328   BP: 138/77   Pulse: 68   Weight: 68.5 kg (151 lb)   Height: 5' 6" (1.676 m)   PainSc:   3     Body mass index is 24.37 kg/m².       Assessment:    Menopausal symptoms  -     AMANDA 0.05 mg/24 hr; Place 1 patch onto the skin twice a week.  Dispense: 8 patch; Refill: 11        Plan:   Risks and benefits of hormone replacement therapy were discussed.  Hormone replacement therapy options, including bioidentical versus non-bioidentical hormones, as well as alternatives discussed.    Prescription for KAYKAY Cruz, brand name only. Discussed reaction was likely to generic brand adhesive or patch material. ED precautions.    Follow up in 6 months.  Instructed patient to call if she experiences any side effects or has any questions.       BARON Duke    "

## 2024-02-28 DIAGNOSIS — N95.1 MENOPAUSAL SYMPTOMS: ICD-10-CM

## 2024-02-28 RX ORDER — ESTRADIOL 0.05 MG/D
1 PATCH, EXTENDED RELEASE TRANSDERMAL
Qty: 24 PATCH | Refills: 3 | Status: SHIPPED | OUTPATIENT
Start: 2024-02-29 | End: 2025-02-28

## 2024-03-01 ENCOUNTER — CLINICAL SUPPORT (OUTPATIENT)
Dept: REHABILITATION | Facility: HOSPITAL | Age: 64
End: 2024-03-01
Payer: MEDICARE

## 2024-03-01 DIAGNOSIS — M54.50 CHRONIC BILATERAL LOW BACK PAIN WITHOUT SCIATICA: Primary | ICD-10-CM

## 2024-03-01 DIAGNOSIS — G89.29 CHRONIC BILATERAL LOW BACK PAIN WITHOUT SCIATICA: Primary | ICD-10-CM

## 2024-03-01 PROCEDURE — 97112 NEUROMUSCULAR REEDUCATION: CPT | Mod: PO

## 2024-03-01 NOTE — PROGRESS NOTES
Physical Therapy Treatment Note     Name: Nora Burger  Clinic Number: [id number]    Therapy Diagnosis: No diagnosis found.  Physician: Noah Monroy MD    Visit Date: 3/1/2024      Physician Orders: PT Eval and Treat   Medical Diagnosis from Referral: Chronic bilateral low back pain without sciatica   Evaluation Date: 2/21/2024  Authorization Period Expiration: 1/21/2025  Plan of Care Expiration: 4/21/2024  Progress Note Due: 3/21/2024  Date of Surgery: NA  Visit # / Visits authorized: 1/ 1   FOTO: 1/ 3     Precautions: Standard      Time In: 9:03  Time Out: 9:55  Total Billable Time: 25 minutes    Subjective     Pt reports: Pt reproted that she is feeling OK and that she has been waliking 2 miles/day for the last 4 days.  She was compliant with home exercise program.  Response to previous treatment: a little sore      Pain: 0/10  Location: bilateral back      Objective     Tri received therapeutic exercises to develop strength, endurance, ROM, flexibility, and posture for 00 minutes including:      Tri received the following manual therapy techniques: Joint mobilizations, Manual traction, and Soft tissue Mobilization were applied to the:  for 00 minutes, including:      Tri participated in neuromuscular re-education activities to improve: Balance and Coordination for 23 minutes. The following activities were included:  Abdominal bracing x 10  Gluteal sets x 10 with tactile cues   Posterior pelvic tilts with small towel roll in her back - in supine - to facilitate proper muscle contraction 2 set so 10  B hip adduction 10 x 3 with a ball  Hook lying knee fallout 10 x 3 with red TB    Tri participated in dynamic functional therapeutic activities to improve functional performance for 30  minutes, including:  Sit to stand x 10  Shuttle B leg press 10 x 2 with 3 bands  Shuttle U leg press 10 x 2 with 2 bands  Side stepping 20  ft x 3 with yellow TB    Tri participated in gait training to improve functional  mobility and safety for 00  minutes, including:    Home Exercises Provided and Patient Education Provided     Education provided:   - Pt to work on gluteal sets throughout the day.    Written Home Exercises Provided: Patient instructed to cont prior HEP.  Exercises were reviewed and Tri was able to demonstrate them prior to the end of the session.  Tri demonstrated good  understanding of the education provided.     See EMR under for exercises provided prior visit.    Assessment     Pt presents to Tx today with no c/o pain and report of walking up to 2 miles each of the last 4 days.  Pt began Tx with continued difficulty engaging her gluteal muscles and ended Tx with an improved ability to do so.  Continue to work with Pt on engaging her core muscles to assist in supporting her lumbar spine.  Tri Is progressing well towards her goals.   Pt prognosis is Good.     Pt will continue to benefit from skilled outpatient physical therapy to address the deficits listed in the problem list box on initial evaluation, provide pt/family education and to maximize pt's level of independence in the home and community environment.     Pt's spiritual, cultural and educational needs considered and pt agreeable to plan of care and goals.     Anticipated barriers to physical therapy: none    Goals:   Short Term Goals: 3 weeks   Pt will be instructed in an exercise program to address functional deficits related to low back and L LE Sx  Improve L hip flexion ROM to >/= 100 degrees  Pt will improve her ability to engage her abdominal and gluteal muscle to assist in supporting her lumbar spine.  Pt will report a decrease in her pain at worse to </= 6/10        Long Term Goals: 6 weeks   Pt will be independent in a HEP to assist in managing her low back and L LE Sx.   Improve L hip flexion ROM to >/= 105 degrees  Pt will report improved functional ability through an improved score on the Lumbar Spine FOTO survey.  Plan     Progress  Physical Therapy therapy program to assist Pt with managing her lumbar Sx.    Boris Vásquez, PT

## 2024-03-08 ENCOUNTER — CLINICAL SUPPORT (OUTPATIENT)
Dept: REHABILITATION | Facility: HOSPITAL | Age: 64
End: 2024-03-08
Payer: MEDICARE

## 2024-03-08 DIAGNOSIS — G89.29 CHRONIC BILATERAL LOW BACK PAIN WITHOUT SCIATICA: Primary | ICD-10-CM

## 2024-03-08 DIAGNOSIS — M54.50 CHRONIC BILATERAL LOW BACK PAIN WITHOUT SCIATICA: Primary | ICD-10-CM

## 2024-03-08 PROCEDURE — 97112 NEUROMUSCULAR REEDUCATION: CPT | Mod: PO,CQ

## 2024-03-08 NOTE — PROGRESS NOTES
Physical Therapy Treatment Note     Name: Nora Burger  Clinic Number: [id number]    Therapy Diagnosis:   Encounter Diagnosis   Name Primary?    Chronic bilateral low back pain without sciatica Yes     Physician: Noah Monroy MD    Visit Date: 3/8/2024      Physician Orders: PT Eval and Treat   Medical Diagnosis from Referral: Chronic bilateral low back pain without sciatica   Evaluation Date: 2/21/2024  Authorization Period Expiration: 1/21/2025  Plan of Care Expiration: 4/21/2024  Progress Note Due: 3/21/2024  Date of Surgery: NA  Visit # / Visits authorized: 1/ 10  FOTO: 1/ 3     Precautions: Standard      Time In: 10:00 am  Time Out: 10:45  Total Billable Time: 15 minutes 1:1 (rest of the time with PTA student)    Subjective     Pt reports: she gets tightness in her back  She was compliant with home exercise program.  Response to previous treatment: a little sore  Functional change: Ongoing    Pain: 2/10  Location: bilateral back      Objective     Bold activities performed this session    Tri received therapeutic exercises to develop strength, endurance, ROM, flexibility, and posture for 00 minutes including:      Tri received the following manual therapy techniques: Joint mobilizations, Manual traction, and Soft tissue Mobilization were applied to the:  for 00 minutes, including:      Tri participated in neuromuscular re-education activities to improve: Balance and Coordination for 25 minutes. The following activities were included:  Abdominal bracing x 10  Gluteal sets x 10 with tactile cues   Posterior pelvic tilts with small towel roll in her back - in supine - to facilitate proper muscle contraction 2 set so 10  B hip adduction 10 x 3 with a ball  Hook lying knee fallout 10 x 3 with red TB  +Bridges 2x10  +Nu step 6'     Tri participated in dynamic functional therapeutic activities to improve functional performance for 20 minutes, including:  Sit to stand x 10  Shuttle B leg press 10 x 2 with 3  bands  Shuttle U leg press 10 x 2 with 2 bands  Side stepping 20  ft x 3 with yellow TB    Tri participated in gait training to improve functional mobility and safety for 00  minutes, including:    Home Exercises Provided and Patient Education Provided     Education provided:   - Pt to work on gluteal sets throughout the day.    Written Home Exercises Provided: Patient instructed to cont prior HEP.  Exercises were reviewed and Tri was able to demonstrate them prior to the end of the session.  Tri demonstrated good  understanding of the education provided.     See EMR under for exercises provided prior visit.    Assessment     Tri presents to treatment with minimal pain in her back. She had some slight discomfort with posterior pelvic tilts, but reports improved pain with movement. Progressed exercises today with no increase in pain or adverse affects. She should be able to tolerate additional progressions next session.   .  Tri Is progressing well towards her goals.   Pt prognosis is Good.     Pt will continue to benefit from skilled outpatient physical therapy to address the deficits listed in the problem list box on initial evaluation, provide pt/family education and to maximize pt's level of independence in the home and community environment.     Pt's spiritual, cultural and educational needs considered and pt agreeable to plan of care and goals.     Anticipated barriers to physical therapy: none    Goals:   Short Term Goals: 3 weeks   Pt will be instructed in an exercise program to address functional deficits related to low back and L LE Sx  Improve L hip flexion ROM to >/= 100 degrees  Pt will improve her ability to engage her abdominal and gluteal muscle to assist in supporting her lumbar spine.  Pt will report a decrease in her pain at worse to </= 6/10        Long Term Goals: 6 weeks   Pt will be independent in a HEP to assist in managing her low back and L LE Sx.   Improve L hip flexion ROM to >/=  105 degrees  Pt will report improved functional ability through an improved score on the Lumbar Spine FOTO survey.    Plan     Progress Physical Therapy therapy program to assist Pt with managing her lumbar Sx.    Yesi Bean, PTA

## 2024-03-11 RX ORDER — UMECLIDINIUM 62.5 UG/1
62.5 AEROSOL, POWDER ORAL DAILY
Qty: 90 CAPSULE | Refills: 2 | Status: SHIPPED | OUTPATIENT
Start: 2024-03-11 | End: 2024-03-18

## 2024-03-13 ENCOUNTER — CLINICAL SUPPORT (OUTPATIENT)
Dept: REHABILITATION | Facility: HOSPITAL | Age: 64
End: 2024-03-13
Payer: MEDICARE

## 2024-03-13 DIAGNOSIS — M54.50 CHRONIC BILATERAL LOW BACK PAIN WITHOUT SCIATICA: Primary | ICD-10-CM

## 2024-03-13 DIAGNOSIS — G89.29 CHRONIC BILATERAL LOW BACK PAIN WITHOUT SCIATICA: Primary | ICD-10-CM

## 2024-03-13 PROCEDURE — 97530 THERAPEUTIC ACTIVITIES: CPT | Mod: PO

## 2024-03-13 PROCEDURE — 97112 NEUROMUSCULAR REEDUCATION: CPT | Mod: PO

## 2024-03-13 NOTE — PROGRESS NOTES
"  Physical Therapy Treatment Note     Name: Nora Burger  Clinic Number: [id number]    Therapy Diagnosis:   No diagnosis found.    Physician: Noah Monroy MD    Visit Date: 3/13/2024  Physician Orders: PT Eval and Treat   Medical Diagnosis from Referral: Chronic bilateral low back pain without sciatica   Evaluation Date: 2/21/2024  Authorization Period Expiration: 1/21/2025  Plan of Care Expiration: 4/21/2024  Progress Note Due: 3/21/2024  Date of Surgery: NA  Visit # / Visits authorized: 3/ 10  FOTO: 1/ 3     Precautions: Standard      Time In: 10:04 am  Time Out: 10:50  Total Billable Time: 46 minutes    Subjective     Pt reports: that she had a rough night sleeping secondary to back pain.  She was compliant with home exercise program.  Response to previous treatment: a little sore  Functional change: Ongoing    Pain: 2/10  Location: bilateral back      Objective   Pt with L PSIS, ASIS and IC elevated in standing and L medial malleolus "shorter" in supine  Bold activities performed this session    Tri received therapeutic exercises to develop strength, endurance, ROM, flexibility, and posture for 00 minutes including:      Tri received the following manual therapy techniques: Joint mobilizations, Manual traction, and Soft tissue Mobilization were applied to the:  for 00 minutes, including:  L upslip correction       Tri participated in neuromuscular re-education activities to improve: Balance and Coordination for 38 minutes. The following activities were included:  Abdominal bracing x 10  Gluteal sets x 10 with tactile cues   Posterior pelvic tilts with small towel roll in her back - in supine - to facilitate proper muscle contraction 2 set of 10  B hip adduction 10 x 2 with a ball  Hook lying knee fallout 10 x 3 with red TB  Bridges 2x10  Nu step 6'     Tri participated in dynamic functional therapeutic activities to improve functional performance for 20 minutes, including:  Sit to stand x 10  Shuttle B " leg press 10 x 2 with 3.5 bands  Shuttle U leg press 10 x 2 with 2.5 bands  Side stepping 20 ft x 3 with yellow TB    Tri participated in gait training to improve functional mobility and safety for 00  minutes, including:    Home Exercises Provided and Patient Education Provided     Education provided:   - Pt to work on gluteal sets throughout the day.    Written Home Exercises Provided: Patient instructed to cont prior HEP.  Exercises were reviewed and Tri was able to demonstrate them prior to the end of the session.  Tri demonstrated good  understanding of the education provided.     See EMR under for exercises provided prior visit.    Assessment     Tri presents to treatment with a L up slip along with her normal leg length difference of L longer than R.  Up slip mobilization normalized her pelvic alignment. Pt with improved engagement of her gluteal muscles today.  Continue to progress trunk stabilization exercises.   .  Tri Is progressing well towards her goals.   Pt prognosis is Good.     Pt will continue to benefit from skilled outpatient physical therapy to address the deficits listed in the problem list box on initial evaluation, provide pt/family education and to maximize pt's level of independence in the home and community environment.     Pt's spiritual, cultural and educational needs considered and pt agreeable to plan of care and goals.     Anticipated barriers to physical therapy: none    Goals:   Short Term Goals: 3 weeks   Pt will be instructed in an exercise program to address functional deficits related to low back and L LE Sx  Improve L hip flexion ROM to >/= 100 degrees  Pt will improve her ability to engage her abdominal and gluteal muscle to assist in supporting her lumbar spine.  Pt will report a decrease in her pain at worse to </= 6/10        Long Term Goals: 6 weeks   Pt will be independent in a HEP to assist in managing her low back and L LE Sx.   Improve L hip flexion ROM to  >/= 105 degrees  Pt will report improved functional ability through an improved score on the Lumbar Spine FOTO survey.    Plan     Progress Physical Therapy therapy program to assist Pt with managing her lumbar Sx.    Boris Vásquez, PT

## 2024-03-18 ENCOUNTER — CLINICAL SUPPORT (OUTPATIENT)
Dept: REHABILITATION | Facility: HOSPITAL | Age: 64
End: 2024-03-18
Payer: MEDICARE

## 2024-03-18 DIAGNOSIS — M54.50 CHRONIC BILATERAL LOW BACK PAIN WITHOUT SCIATICA: Primary | ICD-10-CM

## 2024-03-18 DIAGNOSIS — G89.29 CHRONIC BILATERAL LOW BACK PAIN WITHOUT SCIATICA: Primary | ICD-10-CM

## 2024-03-18 PROCEDURE — 97530 THERAPEUTIC ACTIVITIES: CPT | Mod: PO,CQ

## 2024-03-18 PROCEDURE — 97112 NEUROMUSCULAR REEDUCATION: CPT | Mod: PO,CQ

## 2024-03-18 RX ORDER — UMECLIDINIUM 62.5 UG/1
62.5 AEROSOL, POWDER ORAL DAILY
Qty: 30 CAPSULE | Refills: 0 | Status: SHIPPED | OUTPATIENT
Start: 2024-03-18 | End: 2024-04-17

## 2024-03-18 NOTE — PROGRESS NOTES
"  Physical Therapy Treatment Note     Name: Nora Burger  Clinic Number: [id number]    Therapy Diagnosis:   Encounter Diagnosis   Name Primary?    Chronic bilateral low back pain without sciatica Yes       Physician: Noah Monroy MD    Visit Date: 3/18/2024  Physician Orders: PT Eval and Treat   Medical Diagnosis from Referral: Chronic bilateral low back pain without sciatica   Evaluation Date: 2/21/2024  Authorization Period Expiration: 1/21/2025  Plan of Care Expiration: 4/21/2024  Progress Note Due: 3/21/2024  Date of Surgery: NA  Visit # / Visits authorized: 4/ 10   FOTO: 1/ 3     Precautions: Standard      Time In: 11:00 am  Time Out: 11:46 am  Total Billable Time: 23 minutes one on one    Subjective     Pt reports: her leg is much better since last visit  She was compliant with home exercise program.  Response to previous treatment: a little sore  Functional change: Ongoing    Pain: 1/10  Location: bilateral back      Objective     Bold activities performed this session    Tri received therapeutic exercises to develop strength, endurance, ROM, flexibility, and posture for 10 minutes including:  Nu step 6'   Bridges 2x10        Tri received the following manual therapy techniques: Joint mobilizations, Manual traction, and Soft tissue Mobilization were applied to the:  for 05 minutes, including:  L upslip correction   L long axis distraction      Tir participated in neuromuscular re-education activities to improve: Balance and Coordination for 18 minutes. The following activities were included:    Abdominal bracing x 10  Gluteal sets x 10 with tactile cues   Posterior pelvic tilts with small towel roll in her back - in supine - to facilitate proper muscle contraction 2 set of 10  B hip adduction 10 x 2 with a ball  Hook lying knee fallout 10 x 3 with red TB  +TrA activation with swiss ball in standing 20x3"  +Standing hip abduction with TrA activation with swiss ball 2x10  +Standing hip extension with " TrA activation with swiss ball 2x10      Tri participated in dynamic functional therapeutic activities to improve functional performance for 13 minutes, including:  Sit to stand 2 x 10  Shuttle B leg press 10 x 2 with 3.5 bands  Shuttle U leg press 10 x 2 with 2.5 bands  Side stepping 20 ft x 3 with yellow TB      Tri participated in gait training to improve functional mobility and safety for 00  minutes, including:    Home Exercises Provided and Patient Education Provided     Education provided:   - Pt to work on gluteal sets throughout the day.    Written Home Exercises Provided: Patient instructed to cont prior HEP.  Exercises were reviewed and Tri was able to demonstrate them prior to the end of the session.  Tri demonstrated good  understanding of the education provided.     See EMR under for exercises provided prior visit.    Assessment     Tri presents to treatment with improved symptoms since last session. She was able to tolerate exercises including progressions with no increase in pain. She was challenged by addition of standing core exercises with cueing required for proper activation. Continue to progress as tolerated.   .  Tri Is progressing well towards her goals.   Pt prognosis is Good.     Pt will continue to benefit from skilled outpatient physical therapy to address the deficits listed in the problem list box on initial evaluation, provide pt/family education and to maximize pt's level of independence in the home and community environment.     Pt's spiritual, cultural and educational needs considered and pt agreeable to plan of care and goals.     Anticipated barriers to physical therapy: none    Goals:   Short Term Goals: 3 weeks   Pt will be instructed in an exercise program to address functional deficits related to low back and L LE Sx  Improve L hip flexion ROM to >/= 100 degrees  Pt will improve her ability to engage her abdominal and gluteal muscle to assist in supporting her  lumbar spine.  Pt will report a decrease in her pain at worse to </= 6/10        Long Term Goals: 6 weeks   Pt will be independent in a HEP to assist in managing her low back and L LE Sx.   Improve L hip flexion ROM to >/= 105 degrees  Pt will report improved functional ability through an improved score on the Lumbar Spine FOTO survey.    Plan     Progress Physical Therapy therapy program to assist Pt with managing her lumbar Sx.    Yesi Bean, PTA

## 2024-03-26 ENCOUNTER — CLINICAL SUPPORT (OUTPATIENT)
Dept: REHABILITATION | Facility: HOSPITAL | Age: 64
End: 2024-03-26
Payer: MEDICARE

## 2024-03-26 DIAGNOSIS — G89.29 CHRONIC BILATERAL LOW BACK PAIN WITHOUT SCIATICA: Primary | ICD-10-CM

## 2024-03-26 DIAGNOSIS — M54.50 CHRONIC BILATERAL LOW BACK PAIN WITHOUT SCIATICA: Primary | ICD-10-CM

## 2024-03-26 PROCEDURE — 97112 NEUROMUSCULAR REEDUCATION: CPT | Mod: PO

## 2024-03-26 NOTE — PROGRESS NOTES
Physical Therapy Treatment Note     Name: Nora Burger  Clinic Number: [id number]    Therapy Diagnosis:   No diagnosis found.      Physician: Noah Monroy MD    Visit Date: 3/26/2024  Physician Orders: PT Eval and Treat   Medical Diagnosis from Referral: Chronic bilateral low back pain without sciatica   Evaluation Date: 2/21/2024  Authorization Period Expiration: 1/21/2025  Plan of Care Expiration: 4/21/2024  Progress Note Due: 4/21/2024  Date of Surgery: NA  Visit # / Visits authorized: 5/ 10   FOTO: 1/ 3     Precautions: Standard      Time In: 11:03 am  Time Out: 11:55 am  Total Billable Time: 25 minutes one on one    Subjective     Pt reports: that her hip is feeling much better since it was mobilized a few treatments ago.  She was compliant with home exercise program.  Response to previous treatment: a little sore  Functional change: Ongoing    Pain: 1/10  Location: bilateral back      Objective      Observation: Pt was able to enter the clinic ambulating independently without an assistive device.      Posture:  L side of pelvis elevated         Lower Extremity Strength  Right LE   Left LE     Knee extension: 5/5 Knee extension: 4/5   Knee flexion: 5/5 Knee flexion: 4/5   Hip flexion: 3/5 Hip flexion: 3-/5   Hip extension:  2/5 Hip extension: 2/5   Hip abduction: 3+/5 Hip abduction: 3-/5   Hip adduction: 4/5 Hip adduction 3+/5   Ankle dorsiflexion: 5/5 Ankle dorsiflexion: 4+/5   Ankle plantarflexion: nt Ankle plantarflexion: nt            Special Tests:  -Repeated Flexion: nt  -Repeated Ext: nt  -Piriformis Test: (-)  -Prone Instability Test: nt  -Bridge Test: nt  -OH Squat: nt        Neuro Dynamic Testing:               Sciatic nerve:                                       SLR:    R = (+)                                                               L = (+)                                        Femoral Nerve:                          Femoral nerve test: (-)        Joint Mobility: L hip hypomobile    "  Palpation: R flank      Sensation: intact     Flexibility:               Ely's test: R = nt degrees ; L = nt degrees              Popliteal Angle: R = 40 degrees ; L = 50 degrees              Kenneth's test: R = nt ; L = nt              Oliver test: R = nt ; L = nt    B quadriceps muscle tightness  Intake Outcome Measure for FOTO Lumbar spine Survey     Therapist reviewed FOTO scores for Nora Burger on 3/26/2024.   FOTO report - see Media section or FOTO account episode details.     Intake Score: 60%        Bold activities performed this session    Tri received therapeutic exercises to develop strength, endurance, ROM, flexibility, and posture for 10 minutes including:  Nu step 6'           Tri received the following manual therapy techniques: Joint mobilizations, Manual traction, and Soft tissue Mobilization were applied to the:  for 00 minutes, including:  L upslip correction   L long axis distraction      Tri participated in neuromuscular re-education activities to improve: Balance and Coordination for 25 minutes. The following activities were included:  Abdominal bracing x 10  Gluteal sets x 10 with tactile cues   Posterior pelvic tilts with small towel roll in her back - in supine - to facilitate proper muscle contraction 2 set of 10  B hip adduction 10 x 2 with a ball  Hook lying knee fallout 10 x 3 with red TB  +TrA activation with swiss ball in standing 20x3"  +Standing hip abduction with TrA activation with UE support no swiss ball today 2x10  +Standing hip extension with TrA activation with swiss ball 2x10  +Standing U hip extension 10 x 2 with trunk flexed and forearms on raised plinth.  Bridges 2x10    Tri participated in dynamic functional therapeutic activities to improve functional performance for 25 minutes, including:  Sit to stand 2 x 10  Shuttle B leg press 10 x 2 with 3.5 bands  Shuttle U leg press 10 x 2 with 3.5 bands  Side stepping 20 ft x 3 with yellow TB  Objective " measures    Tri participated in gait training to improve functional mobility and safety for 00  minutes, including:    Home Exercises Provided and Patient Education Provided     Education provided:   - Pt to work on gluteal sets throughout the day.    Written Home Exercises Provided: Patient instructed to cont prior HEP.  Exercises were reviewed and Tri was able to demonstrate them prior to the end of the session.  Tri demonstrated good  understanding of the education provided.     See EMR under for exercises provided prior visit.    Assessment     Tri presents to treatment with improved symptoms following L pelvis mobilization. She shows improvement in B hip strength, but there remains room for improvement. She had difficulty performing standing hip exercises with a swiss ball and concentrate on her core muscles.  She was able to tolerate today's evaluation and exercises listed above.  Continue to progress hip and pelvic muscle strength and flexibility as tolerated.   .  Tri Is progressing well towards her goals.   Pt prognosis is Good.     Pt will continue to benefit from skilled outpatient physical therapy to address the deficits listed in the problem list box on initial evaluation, provide pt/family education and to maximize pt's level of independence in the home and community environment.     Pt's spiritual, cultural and educational needs considered and pt agreeable to plan of care and goals.     Anticipated barriers to physical therapy: none    Goals:   Short Term Goals: 3 weeks   Pt will be instructed in an exercise program to address functional deficits related to low back and L LE Sx  Improve L hip flexion ROM to >/= 100 degrees - MET 3/26/2024  Pt will improve her ability to engage her abdominal and gluteal muscle to assist in supporting her lumbar spine. - MET 3/26/2024  Pt will report a decrease in her pain at worse to </= 6/10 - MET 3/26/2024        Long Term Goals: 6 weeks   Pt will be  independent in a HEP to assist in managing her low back and L LE Sx.   Improve L hip flexion ROM to >/= 105 degrees - MET 3/26/2024  Pt will report improved functional ability through an improved score on the Lumbar Spine FOTO survey.    Plan     Progress Physical Therapy therapy program to assist Pt with managing her lumbar Sx.    Boris Vásquez, PT

## 2024-04-04 ENCOUNTER — CLINICAL SUPPORT (OUTPATIENT)
Dept: REHABILITATION | Facility: HOSPITAL | Age: 64
End: 2024-04-04
Payer: MEDICARE

## 2024-04-04 DIAGNOSIS — G89.29 CHRONIC BILATERAL LOW BACK PAIN WITHOUT SCIATICA: Primary | ICD-10-CM

## 2024-04-04 DIAGNOSIS — M54.50 CHRONIC BILATERAL LOW BACK PAIN WITHOUT SCIATICA: Primary | ICD-10-CM

## 2024-04-04 PROCEDURE — 97112 NEUROMUSCULAR REEDUCATION: CPT | Mod: PO

## 2024-04-04 NOTE — PROGRESS NOTES
Physical Therapy Treatment Note     Name: Nora Burger  Clinic Number: [id number]    Therapy Diagnosis:   Encounter Diagnosis   Name Primary?    Chronic bilateral low back pain without sciatica Yes         Physician: Noah Monroy MD    Visit Date: 4/4/2024  Physician Orders: PT Eval and Treat   Medical Diagnosis from Referral: Chronic bilateral low back pain without sciatica   Evaluation Date: 2/21/2024  Authorization Period Expiration: 1/21/2025  Plan of Care Expiration: 4/21/2024  Progress Note Due: 4/21/2024  Date of Surgery: NA  Visit # / Visits authorized: 6/ 10   FOTO: 1/ 3     Precautions: Standard      Time In: 11:03 am  Time Out: 11:55 am  Total Billable Time: 25 minutes one on one    Subjective     Pt reports: that her hip is feeling much better since it was mobilized a few treatments ago.  She was compliant with home exercise program.  Response to previous treatment: a little sore  Functional change: Ongoing    Pain: 1/10  Location: bilateral back      Objective      Observation: Pt was able to enter the clinic ambulating independently without an assistive device.       Bold activities performed this session    Tri received therapeutic exercises to develop strength, endurance, ROM, flexibility, and posture for 10 minutes including:  Nu step 6'           Tri received the following manual therapy techniques: Joint mobilizations, Manual traction, and Soft tissue Mobilization were applied to the:  for 00 minutes, including:  L upslip correction   L long axis distraction      Tri participated in neuromuscular re-education activities to improve: Balance and Coordination for 25 minutes. The following activities were included:  Abdominal bracing x 10  Gluteal sets x 10 with tactile cues   Posterior pelvic tilts with small towel roll in her back - in supine - to facilitate proper muscle contraction 2 set of 10  B hip adduction 10 x 2 with a ball  Hook lying knee fallout 10 x 3 with red TB  +TrA  "activation with swiss ball in standing 20x3"  +Standing hip abduction with TrA activation with UE support no swiss ball today 2x10  +Standing hip extension with TrA activation with swiss ball 2x10  +Standing U hip extension 10 x 2 with trunk flexed and forearms on raised plinth.  Bridges 2x10    Tri participated in dynamic functional therapeutic activities to improve functional performance for 25 minutes, including:  Sit to stand 2 x 10  Shuttle B leg press 10 x 2 with 3.5 bands  Shuttle U leg press 10 x 2 with 3.5 bands  Side stepping 20 ft x 3 with yellow TB  Objective measures    Tri participated in gait training to improve functional mobility and safety for 00  minutes, including:    Home Exercises Provided and Patient Education Provided     Education provided:   - Pt to work on gluteal sets throughout the day.    Written Home Exercises Provided: Patient instructed to cont prior HEP.  Exercises were reviewed and Tri was able to demonstrate them prior to the end of the session.  Tri demonstrated good  understanding of the education provided.     See EMR under for exercises provided prior visit.    Assessment     Tri presents to treatment with improved symptoms following L pelvis mobilization. She shows improvement in B hip strength, but there remains room for improvement. She had difficulty performing standing hip exercises with a swiss ball and concentrate on her core muscles.  She was able to tolerate today's evaluation and exercises listed above.  Continue to progress hip and pelvic muscle strength and flexibility as tolerated.   .  Tri Is progressing well towards her goals.   Pt prognosis is Good.     Pt will continue to benefit from skilled outpatient physical therapy to address the deficits listed in the problem list box on initial evaluation, provide pt/family education and to maximize pt's level of independence in the home and community environment.     Pt's spiritual, cultural and educational " needs considered and pt agreeable to plan of care and goals.     Anticipated barriers to physical therapy: none    Goals:   Short Term Goals: 3 weeks   Pt will be instructed in an exercise program to address functional deficits related to low back and L LE Sx  Improve L hip flexion ROM to >/= 100 degrees - MET 3/26/2024  Pt will improve her ability to engage her abdominal and gluteal muscle to assist in supporting her lumbar spine. - MET 3/26/2024  Pt will report a decrease in her pain at worse to </= 6/10 - MET 3/26/2024        Long Term Goals: 6 weeks   Pt will be independent in a HEP to assist in managing her low back and L LE Sx.   Improve L hip flexion ROM to >/= 105 degrees - MET 3/26/2024  Pt will report improved functional ability through an improved score on the Lumbar Spine FOTO survey.    Plan     Progress Physical Therapy therapy program to assist Pt with managing her lumbar Sx.    Boris Vásquez, PT

## 2024-04-15 ENCOUNTER — PATIENT MESSAGE (OUTPATIENT)
Dept: ADMINISTRATIVE | Facility: HOSPITAL | Age: 64
End: 2024-04-15
Payer: MEDICARE

## 2024-04-17 ENCOUNTER — PATIENT OUTREACH (OUTPATIENT)
Dept: ADMINISTRATIVE | Facility: HOSPITAL | Age: 64
End: 2024-04-17
Payer: MEDICARE

## 2024-04-17 DIAGNOSIS — Z12.31 SCREENING MAMMOGRAM, ENCOUNTER FOR: Primary | ICD-10-CM

## 2024-05-02 ENCOUNTER — OFFICE VISIT (OUTPATIENT)
Dept: DERMATOLOGY | Facility: CLINIC | Age: 64
End: 2024-05-02
Payer: MEDICARE

## 2024-05-02 DIAGNOSIS — D18.01 CHERRY ANGIOMA: ICD-10-CM

## 2024-05-02 DIAGNOSIS — Z12.83 SCREENING EXAM FOR SKIN CANCER: ICD-10-CM

## 2024-05-02 DIAGNOSIS — L82.0 SEBORRHEIC KERATOSES, INFLAMED: Primary | ICD-10-CM

## 2024-05-02 DIAGNOSIS — L82.1 SEBORRHEIC KERATOSES: ICD-10-CM

## 2024-05-02 DIAGNOSIS — L81.4 LENTIGO: ICD-10-CM

## 2024-05-02 DIAGNOSIS — D22.9 MULTIPLE BENIGN NEVI: ICD-10-CM

## 2024-05-02 PROCEDURE — 99213 OFFICE O/P EST LOW 20 MIN: CPT | Mod: PBBFAC,25 | Performed by: STUDENT IN AN ORGANIZED HEALTH CARE EDUCATION/TRAINING PROGRAM

## 2024-05-02 PROCEDURE — 99999 PR PBB SHADOW E&M-EST. PATIENT-LVL III: CPT | Mod: PBBFAC,,, | Performed by: STUDENT IN AN ORGANIZED HEALTH CARE EDUCATION/TRAINING PROGRAM

## 2024-05-02 PROCEDURE — 17110 DESTRUCTION B9 LES UP TO 14: CPT | Mod: PBBFAC | Performed by: STUDENT IN AN ORGANIZED HEALTH CARE EDUCATION/TRAINING PROGRAM

## 2024-05-02 PROCEDURE — 17110 DESTRUCTION B9 LES UP TO 14: CPT | Mod: S$PBB,,, | Performed by: STUDENT IN AN ORGANIZED HEALTH CARE EDUCATION/TRAINING PROGRAM

## 2024-05-02 PROCEDURE — 99203 OFFICE O/P NEW LOW 30 MIN: CPT | Mod: 25,S$PBB,, | Performed by: STUDENT IN AN ORGANIZED HEALTH CARE EDUCATION/TRAINING PROGRAM

## 2024-05-02 NOTE — PATIENT INSTRUCTIONS

## 2024-05-02 NOTE — PROGRESS NOTES
Subjective:      Patient ID:  Nora Burger is a 63 y.o. female who presents for   Chief Complaint   Patient presents with    Skin Check     Pt here for TBSE    Pt denies personal h/o skin cancer but dad had SCC    Pt concerned about sun spots on face       Review of Systems   Hematologic/Lymphatic: Bruises/bleeds easily.       Objective:   Physical Exam   Constitutional: She appears well-developed and well-nourished. No distress.   Neurological: She is alert and oriented to person, place, and time. She is not disoriented.   Psychiatric: She has a normal mood and affect.   Skin:   Areas Examined (abnormalities noted in diagram):   Scalp / Hair Palpated and Inspected  Head / Face Inspection Performed  Neck Inspection Performed  Chest / Axilla Inspection Performed  Abdomen Inspection Performed  Back Inspection Performed  RUE Inspected  LUE Inspection Performed  RLE Inspected  LLE Inspection Performed  Nails and Digits Inspection Performed                     Diagram Legend     Erythematous scaling macule/papule c/w actinic keratosis       Vascular papule c/w angioma      Pigmented verrucoid papule/plaque c/w seborrheic keratosis      Yellow umbilicated papule c/w sebaceous hyperplasia      Irregularly shaped tan macule c/w lentigo     1-2 mm smooth white papules consistent with Milia      Movable subcutaneous cyst with punctum c/w epidermal inclusion cyst      Subcutaneous movable cyst c/w pilar cyst      Firm pink to brown papule c/w dermatofibroma      Pedunculated fleshy papule(s) c/w skin tag(s)      Evenly pigmented macule c/w junctional nevus     Mildly variegated pigmented, slightly irregular-bordered macule c/w mildly atypical nevus      Flesh colored to evenly pigmented papule c/w intradermal nevus       Pink pearly papule/plaque c/w basal cell carcinoma      Erythematous hyperkeratotic cursted plaque c/w SCC      Surgical scar with no sign of skin cancer recurrence      Open and closed comedones       Inflammatory papules and pustules      Verrucoid papule consistent consistent with wart     Erythematous eczematous patches and plaques     Dystrophic onycholytic nail with subungual debris c/w onychomycosis     Umbilicated papule    Erythematous-base heme-crusted tan verrucoid plaque consistent with inflamed seborrheic keratosis     Erythematous Silvery Scaling Plaque c/w Psoriasis     See annotation      Assessment / Plan:        Seborrheic keratoses, inflamed  - lesions were itchy    Cryosurgery procedure note:    Verbal consent from the patient is obtained including, but not limited to, risk of hypopigmentation/hyperpigmentation, scar, recurrence of lesion. Liquid nitrogen cryosurgery is applied to 2 lesions to produce a freeze injury. The patient is aware that blisters may form and is instructed on wound care with gentle cleansing and use of vaseline ointment to keep moist until healed. The patient is supplied a handout on cryosurgery and is instructed to call if lesions do not completely resolve.    Multiple benign nevi  Seborrheic keratoses  Lentigo  Turcios angioma  Reassurance given to patient. No treatment is necessary.   Treatment of benign, asymptomatic lesions may be considered cosmetic.    Screening exam for skin cancer  Total body skin examination performed today including at least 12 points as noted in physical examination. No lesions suspicious for malignancy noted.    Recommend daily sun protection/avoidance, use of at least SPF 30, broad spectrum sunscreen (OTC drug), skin self examinations, and routine physician surveillance to optimize early detection         Follow up in about 1 year (around 5/2/2025) for TBSE.

## 2024-06-03 ENCOUNTER — PATIENT MESSAGE (OUTPATIENT)
Dept: OBSTETRICS AND GYNECOLOGY | Facility: CLINIC | Age: 64
End: 2024-06-03
Payer: MEDICARE

## 2024-06-10 ENCOUNTER — PATIENT MESSAGE (OUTPATIENT)
Dept: INTERNAL MEDICINE | Facility: CLINIC | Age: 64
End: 2024-06-10
Payer: MEDICARE

## 2024-06-24 ENCOUNTER — PATIENT MESSAGE (OUTPATIENT)
Dept: GASTROENTEROLOGY | Facility: CLINIC | Age: 64
End: 2024-06-24
Payer: MEDICARE

## 2024-07-09 ENCOUNTER — PATIENT MESSAGE (OUTPATIENT)
Dept: OBSTETRICS AND GYNECOLOGY | Facility: CLINIC | Age: 64
End: 2024-07-09
Payer: MEDICARE

## 2024-07-09 ENCOUNTER — HOSPITAL ENCOUNTER (OUTPATIENT)
Dept: RADIOLOGY | Facility: HOSPITAL | Age: 64
Discharge: HOME | End: 2024-07-09
Attending: INTERNAL MEDICINE
Payer: MEDICARE

## 2024-07-09 DIAGNOSIS — N63.0 MASS OF BREAST, UNSPECIFIED LATERALITY: Primary | ICD-10-CM

## 2024-07-09 DIAGNOSIS — N64.89 OTHER SPECIFIED DISORDERS OF BREAST: ICD-10-CM

## 2024-07-09 DIAGNOSIS — Z12.31 ENCOUNTER FOR SCREENING MAMMOGRAM FOR BREAST CANCER: ICD-10-CM

## 2024-07-23 ENCOUNTER — PATIENT MESSAGE (OUTPATIENT)
Dept: GASTROENTEROLOGY | Facility: CLINIC | Age: 64
End: 2024-07-23
Payer: MEDICARE

## 2024-07-24 ENCOUNTER — HOSPITAL ENCOUNTER (OUTPATIENT)
Dept: RADIOLOGY | Facility: HOSPITAL | Age: 64
Discharge: HOME | End: 2024-07-24
Attending: NURSE PRACTITIONER
Payer: MEDICARE

## 2024-07-24 DIAGNOSIS — N63.0 MASS OF BREAST, UNSPECIFIED LATERALITY: ICD-10-CM

## 2024-07-24 DIAGNOSIS — N64.89 OTHER SPECIFIED DISORDERS OF BREAST: ICD-10-CM

## 2024-07-24 PROCEDURE — 77066 DX MAMMO INCL CAD BI: CPT | Mod: TC

## 2024-07-24 PROCEDURE — 76642 ULTRASOUND BREAST LIMITED: CPT | Mod: TC,LT

## 2024-07-24 PROCEDURE — 77062 BREAST TOMOSYNTHESIS BI: CPT | Mod: TC

## 2024-07-31 ENCOUNTER — OFFICE VISIT (OUTPATIENT)
Dept: GASTROENTEROLOGY | Facility: CLINIC | Age: 64
End: 2024-07-31
Payer: MEDICARE

## 2024-07-31 VITALS — WEIGHT: 154 LBS | BODY MASS INDEX: 24.75 KG/M2 | HEIGHT: 66 IN

## 2024-07-31 DIAGNOSIS — Z86.19 HISTORY OF HELICOBACTER PYLORI INFECTION: ICD-10-CM

## 2024-07-31 DIAGNOSIS — R59.0 LYMPHADENOPATHY, ABDOMINAL: ICD-10-CM

## 2024-07-31 DIAGNOSIS — R10.13 EPIGASTRIC ABDOMINAL PAIN: Primary | ICD-10-CM

## 2024-07-31 PROCEDURE — 99214 OFFICE O/P EST MOD 30 MIN: CPT | Mod: 95,,, | Performed by: INTERNAL MEDICINE

## 2024-07-31 NOTE — PROGRESS NOTES
The patient location is:  At home  The chief complaint leading to consultation is:  Lymph node enlargement intermittent epigastric pain status post H pylori infection  Visit type: audiovisualFace to Face time with patient: 30 minutes of total time spent on the encounter, which includes face to face time and non-face to face time preparing to see the patient (eg, review of tests), Obtaining and/or reviewing separately obtained history, Documenting clinical information in the electronic or other health record, Independently interpreting results (not separately reported) and communicating results to the patient/family/caregiver, or Care coordination (not separately reported).     Each patient to whom he or she provides medical services by telemedicine is:  (1) informed of the relationship between the physician and patient and the respective role of any other health care provider with respect to management of the patient; and (2) notified that he or she may decline to receive medical services by telemedicine and may withdraw from such care at any time.    Notes:           [facility] Consultation Note    Reason for Consult:  The primary encounter diagnosis was Epigastric abdominal pain. Diagnoses of Lymphadenopathy, abdominal and History of Helicobacter pylori infection were also pertinent to this visit.    PCP:   Noah Monroy       Referring MD:  No referring provider defined for this encounter.    Initial History of Present Illness (HPI):  This is a 64 y.o. female here for evaluation of Small peripancreatic lymph node seen on prior imaging and intermittent epigastric pain.  Patient has successfully eradicated her H pylori infection confirmed with a negative stool for H pylori antigen done appropriately off all Pepto-Bismol and off all antibiotics for 4 weeks and off all PPIs and off all H2 blockers for at least 2 weeks.  Patient has seen Pulmonary and being followed for abnormal lung findings she  says she can do an MRI scan without difficulty ultrasound shows some tiny gallbladder crystals will get a follow-up of the leonel pancreatic lymph node and pancreas along with a gallbladder and liver with her MRI MRCP.  Will have her do 12 hour fasting blood work.  No weight loss no fever no chills no shortness of breath no chest pain no dysuria urgency or frequency she is doing well with her CPAP machine.    Abdominal pain - as above  Reflux - no  Dysphagia - no   Bowel habits - normal  GI bleeding - none  NSAID usage - none    Interval HPI 07/31/2024:  The patient's last visit with me was on 10/31/2023.      ROS:  Constitutional: No fevers, chills, No weight loss  ENT:  No heartburn no dysphagia no odynophagia no hoarseness  CV: No chest pain, no palpitation  Pulm: No cough, No shortness of breath, no wheezing  Ophtho: No vision changes  GI: see HPI  Derm: No rash, no itching but did have several months back hives that were suspected from adhesive from a patch she was wearing has all resolved with changing of that and steroids  Heme: No lymphadenopathy, No easy bruising  MSK:  Does have arthritis  : No dysuria, No hematuria  Endo: No hot or cold intolerance  Neuro: No syncope, No seizure, no strokes  Psych: No uncontrolled anxiety, No uncontrolled depression    Medical History:  has a past medical history of Abnormal Pap smear of cervix, GERD (gastroesophageal reflux disease), Hypertension, and Hypothyroidism, unspecified.    Surgical History:  has a past surgical history that includes Hysterectomy; Augmentation of breast (Bilateral); Oophorectomy; Colonoscopy (N/A, 07/28/2023); Esophagogastroduodenoscopy (N/A, 07/28/2023); Endoscopic ultrasound of upper gastrointestinal tract (N/A, 09/19/2023); Cervical biopsy w/ loop electrode excision; and Breast cyst aspiration.    Family History: family history includes Cancer in her paternal aunt; Colon cancer in her paternal grandfather..     Social History:  reports that  "she quit smoking about 2 years ago. Her smoking use included cigarettes. She started smoking about 51 years ago. She has a 49 pack-year smoking history. She does not have any smokeless tobacco history on file. She reports current alcohol use of about 1.0 standard drink of alcohol per week. She reports that she does not currently use drugs.    Review of patient's allergies indicates:   Allergen Reactions    Lyllana [estradiol] Hives     Urticaria, anxiety, tongue swelling, difficulty breathing       Medication List with Changes/Refills   Current Medications    ALBUTEROL (VENTOLIN HFA) 90 MCG/ACTUATION INHALER    Inhale 2 puffs into the lungs every 6 (six) hours as needed for Wheezing or Shortness of Breath. Rescue    ASPIRIN (ECOTRIN) 81 MG EC TABLET    Take 81 mg by mouth once daily.    DILTIAZEM (CARDIZEM CD) 240 MG 24 HR CAPSULE    Take 1 capsule (240 mg total) by mouth once daily.    AMANDA 0.05 MG/24 HR    Place 1 patch onto the skin twice a week.    EZETIMIBE (ZETIA) 10 MG TABLET    Take 1 tablet (10 mg total) by mouth once daily.    HYDROCHLOROTHIAZIDE (HYDRODIURIL) 25 MG TABLET    Take 1 tablet (25 mg total) by mouth once daily.    LEVOTHYROXINE (SYNTHROID) 50 MCG TABLET    Take 1 tablet (50 mcg total) by mouth before breakfast.    LOSARTAN (COZAAR) 50 MG TABLET    Take 1 tablet (50 mg total) by mouth once daily.    OMEPRAZOLE (PRILOSEC) 20 MG CAPSULE    Take 1 capsule (20 mg total) by mouth before breakfast. Best taken 45-60 minutes before your 1st protein meal of the day breakfast         Objective Findings:    Vital Signs:  Ht 5' 6" (1.676 m)   Wt 69.9 kg (154 lb)   BMI 24.86 kg/m²   Body mass index is 24.86 kg/m².    Physical Exam:  Telemedicine video visit  General Appearance: Well appearing in no acute distress  Neurologic:  Alert and oriented x4  Psychiatric:  Normal speech mentation and affect    Labs:  Lab Results   Component Value Date    WBC 7.28 01/08/2024    HGB 15.3 01/08/2024    HCT 46.7 " 01/08/2024     01/08/2024    CHOL 175 02/07/2024    TRIG 92 02/07/2024    HDL 54 02/07/2024    ALT 14 01/08/2024    AST 18 01/08/2024     01/08/2024    K 4.2 01/08/2024     01/08/2024    CREATININE 0.8 01/08/2024    BUN 15 01/08/2024    CO2 30 (H) 01/08/2024    TSH 2.724 02/07/2024    HGBA1C 5.4 02/07/2024       Medical Decision Making:  Lab work reviewed  EGD and Colonoscopy images and path reviewed  EUS reviewed CT scan images personally reviewed by myself  H pylori talk given  Fasting lab talk given an MRI MRCP talk given    Stool for H pylori antigen is negative done off PPIs and H2 blockers for at least 2 weeks and off all antibiotics and Pepto-Bismol for at least 8 weeks    The Olympus scope GIF-                          ([id number]) was introduced through the mouth, and                          advanced to the third part of duodenum. The upper                          GI endoscopy was accomplished without difficulty.                          The patient tolerated the procedure well.   Findings:        The examined duodenum was normal.        Patchy mildly erythematous mucosa without bleeding was found in the        gastric body, in the gastric antrum and in the prepyloric region of        the stomach. Biopsies were taken with a cold forceps for histology.        Biopsies were taken with a cold forceps for histology. Estimated        blood loss was minimal.        The cardia and gastric fundus were normal on retroflexion.        A 2 cm hiatal hernia was found. The proximal extent of the gastric        folds (end of tubular esophagus) was 40 cm from the incisors. The        hiatal narrowing was 42 cm from the incisors. The Z-line was 40 cm        from the incisors.        A low-grade of narrowing, non-obstructing and mild Schatzki ring was        found at the gastroesophageal junction. A TTS dilator was passed        through the scope. Dilation with a 15-16.5-18 mm balloon dilator was         performed to 18 mm. The dilation site was examined following        endoscope reinsertion and showed no perforation.   Impression:            - Normal examined duodenum.                          - Erythematous mucosa in the gastric body, antrum                          and prepyloric region of the stomach. Biopsied.                          - 2 cm hiatal hernia.                          - Low-grade of narrowing, non-obstructing and mild                          Schatzki ring. Dilated.   Recommendation:        - Discharge patient to home.                          - Follow an antireflux regimen indefinitely.                          - Await pathology results.                          - Telephone endoscopist for pathology results in 3                          weeks.                          - Use Protonix 40 mg once daily (or any other full                          strength proton pump inhibitor) - best taken 45                          minutes before your first protein containing meal.                          - Repeat upper endoscopy PRN for retreatment.                          - Return to GI clinic at the next available                          appointment.                          - The findings and recommendations were discussed                          with the patient.   Attending Participation:        I personally performed the entire procedure.   Charles Shah MD   7/28/2023   The Olympus scope PCF-H190DL ([id number]) was                          introduced through the anus and advanced to the                          terminal ileum, with identification of the                          appendiceal orifice and IC valve. The colonoscopy                          was performed without difficulty. The patient                          tolerated the procedure well. The quality of the                          bowel preparation was excellent. Scope insertion                          time was 9 minutes. Scope  withdrawal time was 10                          minutes. The total duration of the procedure was                          19 minutes. The terminal ileum, ileocecal valve,                          appendiceal orifice, and rectum were photographed.   Findings:        The terminal ileum appeared normal.        A 1 mm polyp was found in the cecum. The polyp was sessile. The        polyp was removed with a cold snare. Resection and retrieval were        complete. Estimated blood loss was minimal.        Diverticula were found in the recto-sigmoid colon, sigmoid colon,        descending colon, transverse colon and ascending colon.        The colon (entire examined portion) was moderately tortuous.        The perianal and digital rectal examinations were normal.        The retroflexed view of the distal rectum and anal verge was normal        and showed no anal or rectal abnormalities.        Non-bleeding internal hemorrhoids were found during retroflexion.        The hemorrhoids were mild.   Impression:            - The examined portion of the ileum was normal.                          - One 1 mm polyp in the cecum, removed with a cold                          snare. Resected and retrieved.                          - Diverticulosis in the recto-sigmoid colon, in                          the sigmoid colon, in the descending colon, in the                          transverse colon and in the ascending colon.                          - Tortuous colon.                          - Non-bleeding internal hemorrhoids.   Recommendation:        - Discharge patient to home.                          - Await pathology results.                          - Telephone endoscopist for pathology results in 3                          weeks.                          - Repeat colonoscopy in 3 years for surveillance                          based on pathology results.                          - Return to GI clinic.                          - The  findings and recommendations were discussed                          with the patient.   Attending Participation:        I personally performed the entire procedure.   Charles Shah MD   7/28/2023    1. Stomach, biopsy:   - Helicobacter pylori-associated chronic mildly active gastritis   - Negative for intestinal metaplasia   - Negative for dysplasia or carcinoma     2. Colon, cecal polypectomy, biopsy:   - Benign polypoid piece of colonic mucosa with prominent lymphoid aggregate   - Negative for dysplasia or carcinoma on multiple examined H&E levels    Comment: Interp By Mike Polanco M.D., Signed on 08/02/2023       The Olympus scope [id number]                          ([id number]) was introduced through the mouth, with                          the intention of advancing to the esophagus. The                          scope was advanced to the cricopharyngeal                          esophagus before the procedure was aborted.                          Medications were given. The Olympus scope                          GIF- ([id number]) was introduced through the                          mouth, and advanced to the second part of                          duodenum. The upper EUS was aborted due to                          angulated UES and significant spasm. The upper EUS                          was performed with difficulty.   Findings:        ENDOSCOPIC FINDING: :        The cricopharyngeus was moderately tortuous.        The examined esophagus was normal.        The entire examined stomach was normal.        The examined duodenum was normal.        Unable to complete the EUS due to angulation and spasm of the UES.   Impression:            - The upper EUS was aborted due to angulated UES                          and significant spasm.                          - Tortuous esophagus.                          - Normal esophagus.                          - Normal stomach.                          - Normal examined  duodenum.                          - No specimens collected.   Recommendation:        - Discharge patient to home (ambulatory).                          - Perform CT scan (computed tomography) of the                          abdomen with contrast at appointment to be                          scheduled.   Attending Participation:        I personally performed the entire procedure.   Simone Mckee MD   9/19/2023   EXAMINATION:  CT ABDOMEN WITH CONTRAST     CLINICAL HISTORY:  abn imaging;  Abnormal findings on diagnostic imaging of other specified body structures     TECHNIQUE:  Low dose axial images, sagittal and coronal reformations were obtained from the lung bases to the iliac crests following the IV administration of 75 mL of Omnipaque 350 intravenous contrast. Oral contrast was administered.     COMPARISON:  U/S abdomen complete 08/17/2023     FINDINGS:  Lungs: Partially imaged opacities within the lateral aspect of the right lower lobe, corresponding to of bandlike scarring/atelectasis seen on prior CT chest.     Heart: Normal size. No pericardial effusion.     Liver: No focal abnormality.     Gallbladder: No pericholecystic inflammatory changes.     Bile ducts: No intrahepatic or extrahepatic biliary ductal dilatation.     Spleen: Normal size.     Pancreas: No mass or ductal dilatation.  No peripancreatic fat stranding.     Adrenals: No significant abnormality     Renal/Ureters: Kidneys enhance symmetrically.  No hydronephrosis.  Proximal ureters are normal in course and caliber.     Stomach/Bowel: No evidence of obstruction or inflammatory changes.     Peritoneum: No free fluid within the abdomen.  No intraperitoneal free air.     Lymph Nodes: Small peripancreatic lymph node.  No pathologic mira enlargement in the abdomen.     Vasculature: Abdominal aorta tapers normally.  Mild atherosclerosis of the abdominal aorta and its branches. Portal vasculature, SMV, and splenic vein are patent.     Bones:  Degenerative changes of the spine.  No acute fractures or osseous destructive lesions.     Soft Tissues: No significant abnormality.     Impression:     No abnormal intra-abdominal findings.     Small peripancreatic lymph node corresponding to findings on prior ultrasound.  This node is not pathologically enlarged.  No pathologic mira enlargement in the abdomen.     Electronically signed by resident: Shahriar Hernandez  Date:                                            09/29/2023  Time:                                           08:55     Electronically signed by: Ray Kennedy MD  Date:                                            09/29/2023  Reason for Exam  Priority: Routine  Dx: Coronary artery calcification seen on CT scan [I25.10 (ICD-10-CM)]; Aortic atherosclerosis [I70.0 (ICD-10-CM)]      View Images Vital Vitrea      Show images for Stress Echo Which stress agent will be used? Treadmill Exercise; Color Flow Doppler? No  Summary     The left ventricle is normal in size with normal systolic function.  The estimated ejection fraction is 65%.  Normal right ventricular size with normal right ventricular systolic function.  The test was stopped because the patient experienced shortness of breath.  Normal left ventricular diastolic function.  There were no arrhythmias during stress.  Mild right atrial enlargement.  Mild tricuspid regurgitation.  The patient's exercise capacity was above average.  Normal central venous pressure (3 mmHg).  The estimated PA systolic pressure is 25 mmHg.  The ECG portion of this study is negative for myocardial ischemia.  The stress echo portion of this study is negative for myocardial ischemia.      Assessment:  1. Epigastric abdominal pain    2. Lymphadenopathy, abdominal    3. History of Helicobacter pylori infection         Recommendations:  1. Stool for H pylori antigen done off all antibiotics and off Pepto-Bismol for 4 weeks as well as off PPIs and H2 blockers for 2 weeks shows H pylori  has been successfully eradicated.  2. Referral for MRI MRCP for further evaluation of intermittent epigastric pain peripancreatic lymphadenopathy in light of Dr. Mckee's inability to pass the EUS scope due to a tortuous esophagus.  3. Family history of colon cancer and personal history of colon polyps patient's next surveillance colonoscopy 3 years from her last which would be July of 2026  4. Sleep apnea doing well on her CPAP machine  5. Abnormal pulmonary findings on CT followed closely by Pulmonary Clinic  6. Return GI clinic 8 weeks for follow-up okay for telemedicine video visit     Follow up in about 8 weeks (around 9/25/2024).      Order summary:  Orders Placed This Encounter    MRI MRCP Abdomen W WO Contrast 3D WO Independent WS XPD    Comprehensive Metabolic Panel    Lipase         Thank you so much for allowing me to participate in the care of Nora Burger    Charles Shah MD    DISCLAIMER: This note was prepared with [employer] voice recognition transcription software. Garbled syntax, mangled or inadvertent pronouns, and other bizarre constructions may be attributed to that software system. While efforts were made to correct any mistakes made by this voice recognition program, some errors and/or omissions may remain in the note that were missed when the note was originally created.

## 2024-07-31 NOTE — Clinical Note
GI MA team Please schedule Vani for 12 hour fasting blood work orders placed Please schedule patient for MRI MRCP across the street [facility] next available Return to GI clinic 8 weeks for follow-up okay for telemedicine video visit Thank you

## 2024-08-01 ENCOUNTER — LAB VISIT (OUTPATIENT)
Dept: LAB | Facility: HOSPITAL | Age: 64
End: 2024-08-01
Attending: INTERNAL MEDICINE
Payer: MEDICARE

## 2024-08-01 DIAGNOSIS — R10.13 EPIGASTRIC ABDOMINAL PAIN: ICD-10-CM

## 2024-08-01 DIAGNOSIS — R59.0 LYMPHADENOPATHY, ABDOMINAL: ICD-10-CM

## 2024-08-01 LAB
ALBUMIN SERPL BCP-MCNC: 4.2 G/DL (ref 3.5–5.2)
ALP SERPL-CCNC: 65 U/L (ref 55–135)
ALT SERPL W/O P-5'-P-CCNC: 11 U/L (ref 10–44)
ANION GAP SERPL CALC-SCNC: 7 MMOL/L (ref 8–16)
AST SERPL-CCNC: 20 U/L (ref 10–40)
BILIRUB SERPL-MCNC: 0.6 MG/DL (ref 0.1–1)
BUN SERPL-MCNC: 16 MG/DL (ref 8–23)
CALCIUM SERPL-MCNC: 9.6 MG/DL (ref 8.7–10.5)
CHLORIDE SERPL-SCNC: 107 MMOL/L (ref 95–110)
CO2 SERPL-SCNC: 28 MMOL/L (ref 23–29)
CREAT SERPL-MCNC: 0.8 MG/DL (ref 0.5–1.4)
EST. GFR  (NO RACE VARIABLE): >60 ML/MIN/1.73 M^2
GLUCOSE SERPL-MCNC: 100 MG/DL (ref 70–110)
LIPASE SERPL-CCNC: 17 U/L (ref 4–60)
POTASSIUM SERPL-SCNC: 4.3 MMOL/L (ref 3.5–5.1)
PROT SERPL-MCNC: 7.1 G/DL (ref 6–8.4)
SODIUM SERPL-SCNC: 142 MMOL/L (ref 136–145)

## 2024-08-01 PROCEDURE — 80053 COMPREHEN METABOLIC PANEL: CPT | Performed by: INTERNAL MEDICINE

## 2024-08-01 PROCEDURE — 36415 COLL VENOUS BLD VENIPUNCTURE: CPT | Performed by: INTERNAL MEDICINE

## 2024-08-01 PROCEDURE — 83690 ASSAY OF LIPASE: CPT | Performed by: INTERNAL MEDICINE

## 2024-08-06 ENCOUNTER — HOSPITAL ENCOUNTER (OUTPATIENT)
Dept: RADIOLOGY | Facility: HOSPITAL | Age: 64
Discharge: HOME | End: 2024-08-06
Attending: INTERNAL MEDICINE
Payer: MEDICARE

## 2024-08-06 DIAGNOSIS — R59.0 LYMPHADENOPATHY, ABDOMINAL: ICD-10-CM

## 2024-08-06 DIAGNOSIS — R10.13 EPIGASTRIC ABDOMINAL PAIN: ICD-10-CM

## 2024-08-06 PROCEDURE — 25500020 PHARM REV CODE 255: Performed by: INTERNAL MEDICINE

## 2024-08-06 PROCEDURE — 74183 MRI ABD W/O CNTR FLWD CNTR: CPT | Mod: TC

## 2024-08-06 PROCEDURE — 76376 3D RENDER W/INTRP POSTPROCES: CPT | Mod: TC

## 2024-08-06 PROCEDURE — A9585 GADOBUTROL INJECTION: HCPCS | Performed by: INTERNAL MEDICINE

## 2024-08-06 RX ORDER — GADOBUTROL 604.72 MG/ML
10 INJECTION INTRAVENOUS
Status: COMPLETED | OUTPATIENT
Start: 2024-08-06 | End: 2024-08-06

## 2024-08-06 RX ADMIN — GADOBUTROL 10 ML: 604.72 INJECTION INTRAVENOUS at 11:08

## 2024-08-08 ENCOUNTER — PATIENT MESSAGE (OUTPATIENT)
Dept: GASTROENTEROLOGY | Facility: CLINIC | Age: 64
End: 2024-08-08
Payer: MEDICARE

## 2024-08-13 ENCOUNTER — PATIENT MESSAGE (OUTPATIENT)
Dept: PULMONOLOGY | Facility: CLINIC | Age: 64
End: 2024-08-13
Payer: MEDICARE

## 2024-08-14 ENCOUNTER — PATIENT MESSAGE (OUTPATIENT)
Dept: GASTROENTEROLOGY | Facility: CLINIC | Age: 64
End: 2024-08-14
Payer: MEDICARE

## 2024-08-14 DIAGNOSIS — R10.13 EPIGASTRIC ABDOMINAL PAIN: Primary | ICD-10-CM

## 2024-08-14 DIAGNOSIS — R10.13 EPIGASTRIC PAIN: ICD-10-CM

## 2024-08-14 DIAGNOSIS — R59.0 LYMPHADENOPATHY, ABDOMINAL: Primary | ICD-10-CM

## 2024-08-14 DIAGNOSIS — R59.0 LYMPHADENOPATHY, ABDOMINAL: ICD-10-CM

## 2024-08-14 RX ORDER — UMECLIDINIUM 62.5 UG/1
62.5 AEROSOL, POWDER ORAL DAILY
Qty: 90 EACH | Refills: 3 | Status: SHIPPED | OUTPATIENT
Start: 2024-08-14

## 2024-08-16 ENCOUNTER — TELEPHONE (OUTPATIENT)
Dept: HEMATOLOGY/ONCOLOGY | Facility: CLINIC | Age: 64
End: 2024-08-16
Payer: MEDICARE

## 2024-08-16 NOTE — TELEPHONE ENCOUNTER
Patient notified of the scheduled appointment with Dr. Villa for 08/22/24.  Agreeable to date and time.

## 2024-08-22 ENCOUNTER — OFFICE VISIT (OUTPATIENT)
Dept: HEMATOLOGY/ONCOLOGY | Facility: CLINIC | Age: 64
End: 2024-08-22
Payer: MEDICARE

## 2024-08-22 VITALS
OXYGEN SATURATION: 96 % | TEMPERATURE: 98 F | BODY MASS INDEX: 24.84 KG/M2 | RESPIRATION RATE: 16 BRPM | HEIGHT: 66 IN | SYSTOLIC BLOOD PRESSURE: 138 MMHG | HEART RATE: 65 BPM | WEIGHT: 154.56 LBS | DIASTOLIC BLOOD PRESSURE: 63 MMHG

## 2024-08-22 DIAGNOSIS — R59.0 LYMPHADENOPATHY, ABDOMINAL: ICD-10-CM

## 2024-08-22 DIAGNOSIS — R10.13 EPIGASTRIC PAIN: ICD-10-CM

## 2024-08-22 PROCEDURE — 99999 PR PBB SHADOW E&M-EST. PATIENT-LVL IV: CPT | Mod: PBBFAC,,, | Performed by: HOSPITALIST

## 2024-08-22 PROCEDURE — 99205 OFFICE O/P NEW HI 60 MIN: CPT | Mod: S$PBB,,, | Performed by: HOSPITALIST

## 2024-08-22 PROCEDURE — 99214 OFFICE O/P EST MOD 30 MIN: CPT | Mod: PBBFAC | Performed by: HOSPITALIST

## 2024-08-22 NOTE — PROGRESS NOTES
[facility] - NEW PATIENT VISIT    Reason for visit: Lymphadenopathy    Workup:  8/17/23: US Abd (epigastric pain)  1.4 x 0.7 x 2.2 cm peripancreatic lymph node  Visualized portions of pancreas appear normal  Mild hepatomegaly and hepatic steatosis    09/19/23:  EUS (evaluation of peripancreatic LN)  Examined esophagus and stomach were normal  Unable to complete EUS due to angulation and spasm of the UES    9/28/23: CT Abd (f/u peripancreatic LN)  Small peripancreatic lymph node  No pathologic mira enlargement in the abdomen  No abnormal intra-abdominal findings    2/03/24: CT Abd   0.9 cm peripancreatic lymph node, within normal limits  No suspicious abdominopelvic lymphadenopathy  1.3 cm fat attenuating focus in distal duodenum, presumably represents lipoma    2/14/24: LDCT  Stable, Lung-RADS 2    8/6/24: MRCP  1.0 cm peripancreatic/periportal lymph node, similar to prior  Several smaller periportal lymph nodes noted  Hepatic steatosis  Pancreas divisum    Studies:   CBC w/ diff wnl, 1/8/24  ESR <2, CRP 7.0, 1/8/24      HPI:     Nora Burger is a 64 y.o. female with pmh significant for GERD, HTN, hypothyroidism, h/o H. Pylori, mitral valve prolapse, osteopenia, TERESA, COPD, and estrogen replacement therapy (patch) who presents for evaluation of a peripancreatic lymph node.     The pt describes some fatigue for at least 1 year; it has been overall stable over that time, noting that it is intermittently worse and intermittently better. She is able to complete all ADLs and iADLs without assistance. She feels that her appetite is good, but she says that when she eats, she develops epigastric pain. This is the same pain that began the workup as above. She notes that she has mild sweats at night, but denies drenching her clothing or her sheets. She says this has been occurring for ~1 year.      Living Situation: She lives in [place] with her . She  has stairs in her house; she is SOB after climbing but is able to continue moving.     Tobacco Use: She stopped smoking 2.5 years ago. She started smoking at age 14, and stopped at age 62. She vaped for a short period, and prior to quitting.     EtOH Use: Not currently, chiefly due to epigastric pain.     Employment / Exposure History: Retired. 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     Family Cancer History: A paternal aunt had lymphoma, her paternal grandfather had colon cancer, her maternal grandfather had a brain tumor, and a paternal first cousin had thyroid cancer.     History has been obtained by chart review and discussion with the patient.      ROS:   As per HPI.       Physical Exam:       There were no vitals taken for this visit.               Physical Exam  Constitutional:       Appearance: Normal appearance.   HENT:      Head: Normocephalic and atraumatic.   Eyes:      Extraocular Movements: Extraocular movements intact.      Conjunctiva/sclera: Conjunctivae normal.      Pupils: Pupils are equal, round, and reactive to light.   Cardiovascular:      Rate and Rhythm: Normal rate and regular rhythm.      Heart sounds: No murmur heard.     No friction rub. No gallop.   Pulmonary:      Effort: Pulmonary effort is normal.      Breath sounds: No wheezing, rhonchi or rales.   Abdominal:      Comments: TTP in epigastric and LUQ region, soft, no rebound tenderness   Musculoskeletal:         General: Normal range of motion.      Right lower leg: No edema.      Left lower leg: No edema.   Lymphadenopathy:      Head:      Right side of head: No submental, submandibular, tonsillar, preauricular or posterior auricular adenopathy.      Left side of head: No submental, submandibular, tonsillar, preauricular or posterior auricular adenopathy.   Skin:     General: Skin is warm and dry.   Neurological:      Mental Status: She is alert and oriented to  person, place, and time.   Psychiatric:         Mood and Affect: Mood normal.         Thought Content: Thought content normal.         Judgment: Judgment normal.           Labs:   No results found for this or any previous visit (from the past 48 hour(s)).     Imaging:    See oncologic history above.     Path:  See oncologic history above.      Assessment and Plan:     Nora Burger is a 64 y.o. female with pmh significant for GERD, HTN, hypothyroidism, h/o H. Pylori, mitral valve prolapse, osteopenia, TERESA, COPD, and estrogen replacement therapy (patch) who presents for evaluation of a peripancreatic lymph node.     Peripancreatic LN Evaluation  Workup as above, initially undertaken due to epigastric tenderness.  A prominent peripancreatic lymph node has been noted on multiple imaging modalities, including an abdominal ultrasound, CT abdomen, and an MRCP.  Notably, the size of the node has been stable over multiple images, and remains within normal size limits. The MRCP did note other sub-cm LN, though these are likewise not pathologically enlarged and were most likely only revealed due to the change in imaging modality.  Also of note, patient had a normal ESR, CRP, and CBC on 1/8/24, at which point the LN had already been identified. Given the stability of the LN, lack of evident disease elsewhere, and lack of systemic symptoms, favor that this represents a normal albeit prominent LN. Agree with plan for repeat MRCP in 6 months to confirm stability, with low threshold for repeat imaging sooner based on clinical concern.     PLAN:   -- Agree with plan for repeat MRCP per GI  -- Message sent to GI    The above information has been reviewed with the patient and all questions have been answered to their apparent satisfaction.  They understand that they can call the clinic with any questions.    Eduardo Villa MD  Hematology/Oncology  [facility]      Route Chart for Scheduling    Med Onc Chart  Routing      Follow up with physician . PRN   Follow up with THIERRY    Infusion scheduling note    Injection scheduling note    Labs    Imaging    Pharmacy appointment    Other referrals                      Disclaimer: This note was prepared using voice recognition system and is likely to have sound alike errors.  Please call me with any questions.

## 2024-08-22 NOTE — Clinical Note
Good afternoon,   I saw this patient in the oncology diagnosis clinic.   Given the size and stability of the peripancreatic LN over a 1 year period, and lack of other evident disease or symptoms, I would favor against this being a malignant process.   If you remain concerned, a PET CT would help determine if the LN were hypermetabolic or not, though my concern is not high enough to outright recommend this right now (with low threshold to repeat imaging). Otherwise, I agree with the plan for a repeat MRCP in 6 months to confirm stability of disease.   Of note, her LUQ and epigastrium were pretty tender to palpation - I believe this is the same pain for which she initially sought care?   Thanks! Santos

## 2024-08-28 ENCOUNTER — OFFICE VISIT (OUTPATIENT)
Dept: GASTROENTEROLOGY | Facility: CLINIC | Age: 64
End: 2024-08-28
Payer: MEDICARE

## 2024-08-28 VITALS
HEIGHT: 66 IN | WEIGHT: 155 LBS | BODY MASS INDEX: 24.91 KG/M2 | DIASTOLIC BLOOD PRESSURE: 79 MMHG | SYSTOLIC BLOOD PRESSURE: 143 MMHG | HEART RATE: 80 BPM

## 2024-08-28 DIAGNOSIS — K29.50 CHRONIC GASTRITIS, PRESENCE OF BLEEDING UNSPECIFIED, UNSPECIFIED GASTRITIS TYPE: Primary | ICD-10-CM

## 2024-08-28 PROCEDURE — 99214 OFFICE O/P EST MOD 30 MIN: CPT | Mod: PBBFAC

## 2024-08-28 PROCEDURE — 99999 PR PBB SHADOW E&M-EST. PATIENT-LVL IV: CPT | Mod: PBBFAC,GC,,

## 2024-08-28 PROCEDURE — 99214 OFFICE O/P EST MOD 30 MIN: CPT | Mod: S$PBB,GC,,

## 2024-08-28 RX ORDER — OMEPRAZOLE 40 MG/1
40 CAPSULE, DELAYED RELEASE ORAL
Qty: 180 CAPSULE | Refills: 3 | Status: SHIPPED | OUTPATIENT
Start: 2024-08-28 | End: 2025-08-28

## 2024-08-28 NOTE — PROGRESS NOTES
"GENERAL GI PATIENT INTAKE:    COVID symptoms in the last 7 days (runny nose, sore throat, congestion, cough, fever): No  PCP: Noah Monroy  If not PCP-  number given to establish [phone]: N/A    ALLERGIES REVIEWED:  Yes    CHIEF COMPLAINT:    Chief Complaint   Patient presents with    Abdominal Pain       VITAL SIGNS:  BP (!) 143/79   Pulse 80   Ht 5' 6" (1.676 m)   Wt 70.3 kg (154 lb 15.7 oz)   BMI 25.01 kg/m²      Change in medical, surgical, family or social history: No      REVIEWED MEDICATION LIST RECONCILED INCLUDING ABOVE MEDS:  Yes     "

## 2024-08-28 NOTE — PROGRESS NOTES
[facility] NOTE    Name: Nora Burger  : 1960  Date of Service: 2024   PCP: Noah Monroy MD    Reason for visit:   Chief Complaint   Patient presents with    Abdominal Pain       HPI:     The patient is a 64 year old female here for follow up. Patient was treated for H.pylor in July of last year, reports improvement in 'generalized' body pains but still had persistent burning abdominal pain. She noticed worsening pain on pantoprazole so she was switched to omeprazole which helped but still has burning pain worse with eating and at night. She has no other associated symptoms but notes it is worse with red meat which she now avoids and worse if she lays flat at night.     Most Recent Upper Endoscopy:   2023- Erythematous mucosa in the gastric body, antrum and prepyloric region of the stomach. Biopsied- +ve for H.pylori  2 cm hiatal hernia, Low-grade of narrowing, non-obstructing and mild Schatzki ring. Dilated.     Most Recent Colonoscopy:   2023- One 1 mm polyp in the cecum, removed with a cold snare. Resected and retrieved (path benign polypoid mucosa). Diverticulosis in the recto-sigmoid colon, in the sigmoid colon, in the descending colon, in the transverse colon and in the ascending colon.                         Review of Systems:   ROS - negative except for otherwise stated in HPI      Medications:   Current Outpatient Medications:     albuterol (VENTOLIN HFA) 90 mcg/actuation inhaler, Inhale 2 puffs into the lungs every 6 (six) hours as needed for Wheezing or Shortness of Breath. Rescue, Disp: 18 g, Rfl: 5    aspirin (ECOTRIN) 81 MG EC tablet, Take 81 mg by mouth once daily., Disp: , Rfl:     diltiaZEM (CARDIZEM CD) 240 MG 24 hr capsule, Take 1 capsule (240 mg total) by mouth once daily., Disp: 90 capsule, Rfl: 3    AMANDA 0.05 mg/24 hr, Place 1 patch onto the skin twice a week., Disp: 24 patch, Rfl: 3    ezetimibe (ZETIA) 10 mg tablet, Take 1 tablet (10 mg total) by  "mouth once daily., Disp: 90 tablet, Rfl: 3    hydroCHLOROthiazide (HYDRODIURIL) 25 MG tablet, Take 1 tablet (25 mg total) by mouth once daily., Disp: 90 tablet, Rfl: 3    levothyroxine (SYNTHROID) 50 MCG tablet, Take 1 tablet (50 mcg total) by mouth before breakfast., Disp: 90 tablet, Rfl: 3    losartan (COZAAR) 50 MG tablet, Take 1 tablet (50 mg total) by mouth once daily., Disp: 90 tablet, Rfl: 3    omeprazole (PRILOSEC) 20 MG capsule, Take 1 capsule (20 mg total) by mouth before breakfast. Best taken 45-60 minutes before your 1st protein meal of the day breakfast, Disp: 90 capsule, Rfl: 3    omeprazole (PRILOSEC) 40 MG capsule, Take 1 capsule (40 mg total) by mouth 2 (two) times daily before meals., Disp: 180 capsule, Rfl: 3    umeclidinium (INCRUSE ELLIPTA) 62.5 mcg/actuation inhalation capsule, Inhale 62.5 mcg into the lungs once daily. Controller, Disp: 90 each, Rfl: 3     Past medical history, past surgical history, family history, allergies, and social history reviewed and updated in EMR.     Vitals:   Vitals:    08/28/24 1452   BP: (!) 143/79   Pulse: 80   Weight: 70.3 kg (154 lb 15.7 oz)   Height: 5' 6" (1.676 m)     Body mass index is 25.01 kg/m².    Physical Exam:   Physical Exam  Constitutional:  not in acute distress and well developed  HENT: Head: Normal, normocephalic.  Eyes: conjunctiva clear and sclera nonicteric  Cardiovascular: 2+ distal pulses  Respiratory: normal chest expansion & respiratory effort and no accessory muscle use  GI: soft, non-tender, without masses or organomegaly  Skin: normal color on visualized skin  Neurological: alert, oriented x3  Psychiatric: mood and affect are within normal limits, pt is a good historian; no memory problems were noted      Assessment:   1. Chronic gastritis, presence of bleeding unspecified, unspecified gastritis type          Given persistence of burning abdominal pain on low dose PPI, discussed increasing and monitoring her symptoms. Also has overlap " symptoms of both gastritis and GERD.     Plan:   - Increase omeprazole to 40mg BID   - Follow up with Dr. Shah       Discussed with staff attending. Attestation to follow.     Malou Peña MD  Gastroenterology Fellow PGY-IV  [facility]

## 2024-10-07 ENCOUNTER — IMMUNIZATION (OUTPATIENT)
Dept: INTERNAL MEDICINE | Facility: CLINIC | Age: 64
End: 2024-10-07
Payer: MEDICARE

## 2024-10-07 ENCOUNTER — OFFICE VISIT (OUTPATIENT)
Dept: INTERNAL MEDICINE | Facility: CLINIC | Age: 64
End: 2024-10-07
Payer: MEDICARE

## 2024-10-07 VITALS
DIASTOLIC BLOOD PRESSURE: 70 MMHG | HEART RATE: 71 BPM | WEIGHT: 155.63 LBS | OXYGEN SATURATION: 98 % | HEIGHT: 66 IN | SYSTOLIC BLOOD PRESSURE: 124 MMHG | BODY MASS INDEX: 25.01 KG/M2

## 2024-10-07 DIAGNOSIS — I10 PRIMARY HYPERTENSION: ICD-10-CM

## 2024-10-07 DIAGNOSIS — Z09 FOLLOW-UP EXAM: Primary | ICD-10-CM

## 2024-10-07 DIAGNOSIS — Z78.9 STATIN INTOLERANCE: ICD-10-CM

## 2024-10-07 DIAGNOSIS — J43.2 CENTRILOBULAR EMPHYSEMA: ICD-10-CM

## 2024-10-07 DIAGNOSIS — Z23 NEED FOR VACCINATION: Primary | ICD-10-CM

## 2024-10-07 DIAGNOSIS — E03.9 HYPOTHYROIDISM (ACQUIRED): ICD-10-CM

## 2024-10-07 DIAGNOSIS — I25.10 CORONARY ARTERY CALCIFICATION SEEN ON CT SCAN: ICD-10-CM

## 2024-10-07 DIAGNOSIS — K21.9 GASTROESOPHAGEAL REFLUX DISEASE, UNSPECIFIED WHETHER ESOPHAGITIS PRESENT: ICD-10-CM

## 2024-10-07 PROCEDURE — 99999 PR PBB SHADOW E&M-EST. PATIENT-LVL IV: CPT | Mod: PBBFAC,,, | Performed by: INTERNAL MEDICINE

## 2024-10-07 PROCEDURE — 90653 IIV ADJUVANT VACCINE IM: CPT | Mod: PBBFAC

## 2024-10-07 PROCEDURE — 99214 OFFICE O/P EST MOD 30 MIN: CPT | Mod: PBBFAC,25 | Performed by: INTERNAL MEDICINE

## 2024-10-07 PROCEDURE — 99999PBSHW FLU VACCINE - ADJUVANTED: Mod: PBBFAC,,,

## 2024-10-07 PROCEDURE — 99214 OFFICE O/P EST MOD 30 MIN: CPT | Mod: S$PBB,,, | Performed by: INTERNAL MEDICINE

## 2024-10-07 PROCEDURE — G0008 ADMIN INFLUENZA VIRUS VAC: HCPCS | Mod: PBBFAC

## 2024-10-07 NOTE — PATIENT INSTRUCTIONS
"    Omeprazole 20 mg twice a day for 1 more month, then decrease to once a day and see how   your stomach feels.     Start CoQ10 - 1 capsule daily. After 1 week, try increasing your ezetimibe (Zetia) to daily.     Recommended vaccinations  Flu shot today  Covid-19 vaccination today  Shingles vaccination at your convenience - "Shingrix" two shot series, 2-6 months apart. Can get here at [facility] or at any local pharmacy.   RSV vaccination  "

## 2024-10-07 NOTE — PROGRESS NOTES
Subjective:       Patient ID: Nora Burger is a 64 y.o. female.    Chief Complaint: Follow-up      HPI  Nora Burger is a 64 y.o. year old female with CAD, HTN, aortic atherosclerosis, emphysema, GERD, hx of esophageal stricture, hypothyroidism, TERESA presents for follow up.     Review of Systems   Constitutional:  Negative for activity change, appetite change, fatigue, fever and unexpected weight change.   HENT:  Negative for congestion, hearing loss, postnasal drip, sneezing, sore throat, trouble swallowing and voice change.    Eyes:  Negative for pain and discharge.   Respiratory:  Negative for cough, choking, chest tightness, shortness of breath and wheezing.    Cardiovascular:  Negative for chest pain, palpitations and leg swelling.   Gastrointestinal:  Negative for abdominal distention, abdominal pain, blood in stool, constipation, diarrhea, nausea and vomiting.   Endocrine: Negative for polydipsia and polyuria.   Genitourinary:  Negative for difficulty urinating and flank pain.   Musculoskeletal:  Negative for arthralgias, back pain, joint swelling, myalgias and neck pain.   Skin:  Negative for rash.   Neurological:  Negative for dizziness, tremors, seizures, weakness, numbness and headaches.   Psychiatric/Behavioral:  Negative for agitation. The patient is not nervous/anxious.          Past Medical History:   Diagnosis Date    Abnormal Pap smear of cervix     GERD (gastroesophageal reflux disease)     Hypertension     Hypothyroidism, unspecified         Prior to Admission medications    Medication Sig Start Date End Date Taking? Authorizing Provider   albuterol (VENTOLIN HFA) 90 mcg/actuation inhaler Inhale 2 puffs into the lungs every 6 (six) hours as needed for Wheezing or Shortness of Breath. Rescue 2/6/24  Yes Noah Monroy MD   aspirin (ECOTRIN) 81 MG EC tablet Take 81 mg by mouth once daily.   Yes Provider, Historical   diltiaZEM (CARDIZEM CD) 240 MG 24 hr capsule Take 1 capsule (240 mg total) by  "mouth once daily. 2/6/24  Yes Noah Monroy MD   AMANDA 0.05 mg/24 hr Place 1 patch onto the skin twice a week. 2/29/24 2/28/25 Yes Melania Austin NP   ezetimibe (ZETIA) 10 mg tablet Take 1 tablet (10 mg total) by mouth once daily. 2/6/24 2/5/25 Yes Noah Monroy MD   hydroCHLOROthiazide (HYDRODIURIL) 25 MG tablet Take 1 tablet (25 mg total) by mouth once daily. 2/6/24  Yes Noah Monroy MD   levothyroxine (SYNTHROID) 50 MCG tablet Take 1 tablet (50 mcg total) by mouth before breakfast. 2/6/24  Yes Noah Monroy MD   losartan (COZAAR) 50 MG tablet Take 1 tablet (50 mg total) by mouth once daily. 2/6/24  Yes Noah Monroy MD   omeprazole (PRILOSEC) 20 MG capsule Take 1 capsule (20 mg total) by mouth before breakfast. Best taken 45-60 minutes before your 1st protein meal of the day breakfast 2/6/24 2/5/25 Yes Noah Monroy MD   omeprazole (PRILOSEC) 40 MG capsule Take 1 capsule (40 mg total) by mouth 2 (two) times daily before meals. 8/28/24 8/28/25 Yes Malou Peña MD   umeclidinium (INCRUSE ELLIPTA) 62.5 mcg/actuation inhalation capsule Inhale 62.5 mcg into the lungs once daily. Controller 8/14/24  Yes Eduardo Valdez MD        Past medical history, surgical history, and family medical history reviewed and updated as appropriate.    Medications and allergies reviewed.     Objective:          Vitals:    10/07/24 1417   BP: 124/70   BP Location: Right arm   Patient Position: Sitting   Pulse: 71   SpO2: 98%   Weight: 70.6 kg (155 lb 10.3 oz)   Height: 5' 6" (1.676 m)     Body mass index is 25.12 kg/m².  Physical Exam  Constitutional:       Appearance: She is well-developed.   HENT:      Head: Normocephalic and atraumatic.   Eyes:      Pupils: Pupils are equal, round, and reactive to light.   Cardiovascular:      Rate and Rhythm: Normal rate and regular rhythm.      Heart sounds: Normal heart sounds.   Pulmonary:      Effort: Pulmonary effort is normal. No respiratory distress.      Breath sounds: Normal breath " "sounds. No wheezing.   Abdominal:      General: Bowel sounds are normal. There is no distension.      Palpations: Abdomen is soft.      Tenderness: There is no abdominal tenderness.   Musculoskeletal:         General: No tenderness. Normal range of motion.      Cervical back: Normal range of motion.   Skin:     General: Skin is warm and dry.   Neurological:      Mental Status: She is alert and oriented to person, place, and time.      Cranial Nerves: No cranial nerve deficit.      Deep Tendon Reflexes: Reflexes are normal and symmetric.         Lab Results   Component Value Date    WBC 7.28 01/08/2024    HGB 15.3 01/08/2024    HCT 46.7 01/08/2024     01/08/2024    CHOL 175 02/07/2024    TRIG 92 02/07/2024    HDL 54 02/07/2024    ALT 11 08/01/2024    AST 20 08/01/2024     08/01/2024    K 4.3 08/01/2024     08/01/2024    CREATININE 0.8 08/01/2024    BUN 16 08/01/2024    CO2 28 08/01/2024    TSH 2.724 02/07/2024    HGBA1C 5.4 02/07/2024       Assessment:       1. Follow-up exam    2. Statin intolerance    3. Coronary artery calcification seen on CT scan    4. Primary hypertension    5. Centrilobular emphysema    6. Hypothyroidism (acquired)    7. Gastroesophageal reflux disease, unspecified whether esophagitis present          Plan:     Nora Downing" was seen today for follow-up.    Diagnoses and all orders for this visit:    Follow-up exam    Statin intolerance  Comments:  was on statin previously, myalgias; switched to zetia by cardiology, doing well on zetia every other day    Coronary artery calcification seen on CT scan  Comments:  on aspirin, intolerant of statin    Primary hypertension    Centrilobular emphysema  Comments:  on incruse ellipta    Hypothyroidism (acquired)  Comments:  on levothyroxine 50    Gastroesophageal reflux disease, unspecified whether esophagitis present    Omeprazole 20 mg twice a day for 1 more month for GERD  Trial of CoQ10 to see if able to tolerate Zetia    Flu " shot today  Covid-19 vaccination today  Shingles vaccination  RSV vaccination    The 10-year ASCVD risk score (Oksana MEDINA, et al., 2019) is: 7.4%    Values used to calculate the score:      Age: 64 years      Sex: Female      Is Non- : No      Diabetic: No      Tobacco smoker: No      Systolic Blood Pressure: 124 mmHg      Is BP treated: Yes      HDL Cholesterol: 54 mg/dL      Total Cholesterol: 175 mg/dL      Health maintenance reviewed with patient.     Follow up in about 6 months (around 4/7/2025).    Noah Monroy MD  Internal Medicine / Primary Care  [facility]  10/7/2024

## 2024-10-08 ENCOUNTER — IMMUNIZATION (OUTPATIENT)
Dept: INTERNAL MEDICINE | Facility: CLINIC | Age: 64
End: 2024-10-08
Payer: MEDICARE

## 2024-10-08 DIAGNOSIS — Z23 NEED FOR VACCINATION: Primary | ICD-10-CM

## 2024-10-08 PROCEDURE — 90480 ADMN SARSCOV2 VAC 1/ONLY CMP: CPT | Mod: PBBFAC

## 2024-10-08 PROCEDURE — 99999PBSHW COVID-19 VAC, TRIS 2023 (PFIZER)(PF) 30 MCG/0.3 ML IM SUSR (12+): Mod: PBBFAC,,,

## 2024-10-08 PROCEDURE — 91320 SARSCV2 VAC 30MCG TRS-SUC IM: CPT | Mod: PBBFAC

## 2024-10-11 ENCOUNTER — TELEPHONE (OUTPATIENT)
Dept: SLEEP MEDICINE | Facility: CLINIC | Age: 64
End: 2024-10-11
Payer: MEDICARE

## 2024-10-11 NOTE — TELEPHONE ENCOUNTER
----- Message from Jorge Luis sent at 10/11/2024 10:54 AM CDT -----  Regarding: CPAP Rx  Name of Who is Calling:  Patient          What is the request in detail:  Please contact patient she stated she would like to speak with Dr. Mosher about a CPAP mask            Can the clinic reply by [facility]: No            What Number to Call Back if not in [facility]: [phone]

## 2024-10-31 ENCOUNTER — OFFICE VISIT (OUTPATIENT)
Dept: SLEEP MEDICINE | Facility: CLINIC | Age: 64
End: 2024-10-31
Payer: MEDICARE

## 2024-10-31 VITALS
SYSTOLIC BLOOD PRESSURE: 147 MMHG | DIASTOLIC BLOOD PRESSURE: 79 MMHG | HEIGHT: 66 IN | BODY MASS INDEX: 25.05 KG/M2 | WEIGHT: 155.88 LBS | HEART RATE: 66 BPM

## 2024-10-31 DIAGNOSIS — G47.33 OSA (OBSTRUCTIVE SLEEP APNEA): Primary | ICD-10-CM

## 2024-10-31 DIAGNOSIS — R35.1 NOCTURIA: ICD-10-CM

## 2024-10-31 DIAGNOSIS — G47.09 OTHER INSOMNIA: ICD-10-CM

## 2024-10-31 PROCEDURE — 99213 OFFICE O/P EST LOW 20 MIN: CPT | Mod: PBBFAC | Performed by: INTERNAL MEDICINE

## 2024-10-31 PROCEDURE — 99999 PR PBB SHADOW E&M-EST. PATIENT-LVL III: CPT | Mod: PBBFAC,,, | Performed by: INTERNAL MEDICINE

## 2024-10-31 PROCEDURE — 99214 OFFICE O/P EST MOD 30 MIN: CPT | Mod: S$PBB,,, | Performed by: INTERNAL MEDICINE

## 2024-11-14 DIAGNOSIS — I10 PRIMARY HYPERTENSION: ICD-10-CM

## 2024-11-14 DIAGNOSIS — I49.3 PVC (PREMATURE VENTRICULAR CONTRACTION): ICD-10-CM

## 2024-11-14 NOTE — TELEPHONE ENCOUNTER
Care Due:                  Date            Visit Type   Department     Provider  --------------------------------------------------------------------------------                                EP -                              PRIMARY      [facility]  Last Visit: 10-      CARE ([facility])   MEDICINE       Noah Monroy  Next Visit: None Scheduled  None         None Found                                                            Last  Test          Frequency    Reason                     Performed    Due Date  --------------------------------------------------------------------------------    Lipid Panel.  12 months..  ezetimibe................  02- 02-    TSH.........  12 months..  levothyroxine............  02- 02-    [facility] Embedded Care Due Messages. Reference number: [id number].   11/14/2024 11:32:58 AM CST

## 2024-11-14 NOTE — TELEPHONE ENCOUNTER
Refill Routing Note   Medication(s) are not appropriate for processing by [facility] for the following reason(s):        Required vitals abnormal    [facility] action(s):  Defer     Requires labs : Yes             Appointments  past 12m or future 3m with PCP    Date Provider   Last Visit   10/7/2024 Noah Monroy MD   Next Visit   Visit date not found Noah Monroy MD   ED visits in past 90 days: 0        Note composed:1:55 PM 11/14/2024

## 2024-11-18 RX ORDER — DILTIAZEM HYDROCHLORIDE 240 MG/1
240 CAPSULE, COATED, EXTENDED RELEASE ORAL DAILY
Qty: 90 CAPSULE | Refills: 3 | Status: SHIPPED | OUTPATIENT
Start: 2024-11-18

## 2025-02-17 ENCOUNTER — HOSPITAL ENCOUNTER (OUTPATIENT)
Dept: RADIOLOGY | Facility: HOSPITAL | Age: 65
Discharge: HOME | End: 2025-02-17
Attending: INTERNAL MEDICINE
Payer: MEDICARE

## 2025-02-17 DIAGNOSIS — R59.0 LYMPHADENOPATHY, ABDOMINAL: ICD-10-CM

## 2025-02-17 DIAGNOSIS — R10.13 EPIGASTRIC ABDOMINAL PAIN: ICD-10-CM

## 2025-02-17 PROCEDURE — 25500020 PHARM REV CODE 255: Performed by: INTERNAL MEDICINE

## 2025-02-17 PROCEDURE — A9585 GADOBUTROL INJECTION: HCPCS | Performed by: INTERNAL MEDICINE

## 2025-02-17 PROCEDURE — 76376 3D RENDER W/INTRP POSTPROCES: CPT | Mod: TC

## 2025-02-17 RX ORDER — GADOBUTROL 604.72 MG/ML
10 INJECTION INTRAVENOUS
Status: COMPLETED | OUTPATIENT
Start: 2025-02-17 | End: 2025-02-17

## 2025-02-17 RX ADMIN — GADOBUTROL 10 ML: 604.72 INJECTION INTRAVENOUS at 10:02

## 2025-02-22 ENCOUNTER — PATIENT MESSAGE (OUTPATIENT)
Dept: GASTROENTEROLOGY | Facility: CLINIC | Age: 65
End: 2025-02-22
Payer: MEDICARE

## 2025-02-22 ENCOUNTER — RESULTS FOLLOW-UP (OUTPATIENT)
Dept: GASTROENTEROLOGY | Facility: CLINIC | Age: 65
End: 2025-02-22
Payer: MEDICARE

## 2025-02-22 DIAGNOSIS — R93.5 ABNORMAL MRI OF ABDOMEN: Primary | ICD-10-CM

## 2025-02-22 NOTE — PROGRESS NOTES
Very important:    Criselda please schedule Tri for repeat MRI MRCP in 6 months which would be August 2025 for follow-up radiology's recommendations.    Tri your MRI scan was read as follows very nonspecific findings but radiology recommends a follow-up MRI in 6 months.    Impression:     1. Stable appearance of subcentimeter peripancreatic/periportal lymph nodes.  2. Hepatic steatosis.  3. Pancreas divisum.  4. Focal arterial hyperenhancement at the max hepatis favoring altered perfusion, though increased in conspicuity from prior study.  Recommend follow-up MRI in 6 months.        Electronically signed by:Chun West MD  Date:                                            02/17/2025    For fatty liver which is hepatic steatosis recommend gradual weight loss ideally about 14 lb weight loss over the next 12-18 months.  Avoidance of alcohol if possible

## 2025-02-24 ENCOUNTER — LAB VISIT (OUTPATIENT)
Dept: LAB | Facility: HOSPITAL | Age: 65
End: 2025-02-24
Attending: INTERNAL MEDICINE
Payer: MEDICARE

## 2025-02-24 ENCOUNTER — OFFICE VISIT (OUTPATIENT)
Dept: INTERNAL MEDICINE | Facility: CLINIC | Age: 65
End: 2025-02-24
Payer: MEDICARE

## 2025-02-24 VITALS
HEIGHT: 66 IN | BODY MASS INDEX: 25.01 KG/M2 | HEART RATE: 74 BPM | OXYGEN SATURATION: 98 % | DIASTOLIC BLOOD PRESSURE: 78 MMHG | SYSTOLIC BLOOD PRESSURE: 122 MMHG | WEIGHT: 155.63 LBS

## 2025-02-24 DIAGNOSIS — I10 PRIMARY HYPERTENSION: ICD-10-CM

## 2025-02-24 DIAGNOSIS — Z87.891 PERSONAL HISTORY OF NICOTINE DEPENDENCE: ICD-10-CM

## 2025-02-24 DIAGNOSIS — J43.2 CENTRILOBULAR EMPHYSEMA: ICD-10-CM

## 2025-02-24 DIAGNOSIS — E03.9 HYPOTHYROIDISM (ACQUIRED): ICD-10-CM

## 2025-02-24 DIAGNOSIS — Z78.9 STATIN INTOLERANCE: ICD-10-CM

## 2025-02-24 DIAGNOSIS — I70.0 AORTIC ATHEROSCLEROSIS: ICD-10-CM

## 2025-02-24 DIAGNOSIS — K21.9 GASTROESOPHAGEAL REFLUX DISEASE, UNSPECIFIED WHETHER ESOPHAGITIS PRESENT: ICD-10-CM

## 2025-02-24 DIAGNOSIS — N18.2 CHRONIC KIDNEY DISEASE, STAGE 2 (MILD): ICD-10-CM

## 2025-02-24 DIAGNOSIS — Z00.00 ENCOUNTER FOR ANNUAL PHYSICAL EXAM: Primary | ICD-10-CM

## 2025-02-24 DIAGNOSIS — I25.10 CORONARY ARTERY CALCIFICATION SEEN ON CT SCAN: ICD-10-CM

## 2025-02-24 DIAGNOSIS — G47.33 OSA (OBSTRUCTIVE SLEEP APNEA): ICD-10-CM

## 2025-02-24 DIAGNOSIS — Z12.31 BREAST CANCER SCREENING BY MAMMOGRAM: ICD-10-CM

## 2025-02-24 DIAGNOSIS — K76.0 HEPATIC STEATOSIS: ICD-10-CM

## 2025-02-24 DIAGNOSIS — E66.3 OVERWEIGHT WITH BODY MASS INDEX (BMI) OF 25 TO 25.9 IN ADULT: ICD-10-CM

## 2025-02-24 LAB
ALBUMIN SERPL BCP-MCNC: 4.3 G/DL (ref 3.5–5.2)
ALP SERPL-CCNC: 60 U/L (ref 40–150)
ALT SERPL W/O P-5'-P-CCNC: 15 U/L (ref 10–44)
ANION GAP SERPL CALC-SCNC: 12 MMOL/L (ref 8–16)
AST SERPL-CCNC: 23 U/L (ref 10–40)
BASOPHILS # BLD AUTO: 0.03 K/UL (ref 0–0.2)
BASOPHILS NFR BLD: 0.5 % (ref 0–1.9)
BILIRUB SERPL-MCNC: 0.5 MG/DL (ref 0.1–1)
BUN SERPL-MCNC: 12 MG/DL (ref 8–23)
CALCIUM SERPL-MCNC: 9.4 MG/DL (ref 8.7–10.5)
CHLORIDE SERPL-SCNC: 104 MMOL/L (ref 95–110)
CHOLEST SERPL-MCNC: 183 MG/DL (ref 120–199)
CHOLEST/HDLC SERPL: 3.1 {RATIO} (ref 2–5)
CO2 SERPL-SCNC: 22 MMOL/L (ref 23–29)
CREAT SERPL-MCNC: 0.7 MG/DL (ref 0.5–1.4)
DIFFERENTIAL METHOD BLD: NORMAL
EOSINOPHIL # BLD AUTO: 0.1 K/UL (ref 0–0.5)
EOSINOPHIL NFR BLD: 1.6 % (ref 0–8)
ERYTHROCYTE [DISTWIDTH] IN BLOOD BY AUTOMATED COUNT: 13.1 % (ref 11.5–14.5)
EST. GFR  (NO RACE VARIABLE): >60 ML/MIN/1.73 M^2
GLUCOSE SERPL-MCNC: 95 MG/DL (ref 70–110)
HCT VFR BLD AUTO: 46.3 % (ref 37–48.5)
HDLC SERPL-MCNC: 60 MG/DL (ref 40–75)
HDLC SERPL: 32.8 % (ref 20–50)
HGB BLD-MCNC: 15.2 G/DL (ref 12–16)
IMM GRANULOCYTES # BLD AUTO: 0.03 K/UL (ref 0–0.04)
IMM GRANULOCYTES NFR BLD AUTO: 0.5 % (ref 0–0.5)
LDLC SERPL CALC-MCNC: 106.6 MG/DL (ref 63–159)
LYMPHOCYTES # BLD AUTO: 1.7 K/UL (ref 1–4.8)
LYMPHOCYTES NFR BLD: 25.8 % (ref 18–48)
MCH RBC QN AUTO: 30.1 PG (ref 27–31)
MCHC RBC AUTO-ENTMCNC: 32.8 G/DL (ref 32–36)
MCV RBC AUTO: 92 FL (ref 82–98)
MONOCYTES # BLD AUTO: 0.5 K/UL (ref 0.3–1)
MONOCYTES NFR BLD: 7.2 % (ref 4–15)
NEUTROPHILS # BLD AUTO: 4.1 K/UL (ref 1.8–7.7)
NEUTROPHILS NFR BLD: 64.4 % (ref 38–73)
NONHDLC SERPL-MCNC: 123 MG/DL
NRBC BLD-RTO: 0 /100 WBC
PLATELET # BLD AUTO: 255 K/UL (ref 150–450)
PMV BLD AUTO: 10.9 FL (ref 9.2–12.9)
POTASSIUM SERPL-SCNC: 3.9 MMOL/L (ref 3.5–5.1)
PROT SERPL-MCNC: 7.4 G/DL (ref 6–8.4)
RBC # BLD AUTO: 5.05 M/UL (ref 4–5.4)
SODIUM SERPL-SCNC: 138 MMOL/L (ref 136–145)
TRIGL SERPL-MCNC: 82 MG/DL (ref 30–150)
TSH SERPL DL<=0.005 MIU/L-ACNC: 1.94 UIU/ML (ref 0.4–4)
WBC # BLD AUTO: 6.4 K/UL (ref 3.9–12.7)

## 2025-02-24 PROCEDURE — 1125F AMNT PAIN NOTED PAIN PRSNT: CPT | Mod: CPTII,S$GLB,, | Performed by: INTERNAL MEDICINE

## 2025-02-24 PROCEDURE — 3288F FALL RISK ASSESSMENT DOCD: CPT | Mod: CPTII,S$GLB,, | Performed by: INTERNAL MEDICINE

## 2025-02-24 PROCEDURE — 99999 PR PBB SHADOW E&M-EST. PATIENT-LVL V: CPT | Mod: PBBFAC,,, | Performed by: INTERNAL MEDICINE

## 2025-02-24 PROCEDURE — 80053 COMPREHEN METABOLIC PANEL: CPT | Performed by: INTERNAL MEDICINE

## 2025-02-24 PROCEDURE — 99397 PER PM REEVAL EST PAT 65+ YR: CPT | Mod: S$GLB,,, | Performed by: INTERNAL MEDICINE

## 2025-02-24 PROCEDURE — 36415 COLL VENOUS BLD VENIPUNCTURE: CPT | Performed by: INTERNAL MEDICINE

## 2025-02-24 PROCEDURE — 3074F SYST BP LT 130 MM HG: CPT | Mod: CPTII,S$GLB,, | Performed by: INTERNAL MEDICINE

## 2025-02-24 PROCEDURE — 1101F PT FALLS ASSESS-DOCD LE1/YR: CPT | Mod: CPTII,S$GLB,, | Performed by: INTERNAL MEDICINE

## 2025-02-24 PROCEDURE — 84443 ASSAY THYROID STIM HORMONE: CPT | Performed by: INTERNAL MEDICINE

## 2025-02-24 PROCEDURE — 3008F BODY MASS INDEX DOCD: CPT | Mod: CPTII,S$GLB,, | Performed by: INTERNAL MEDICINE

## 2025-02-24 PROCEDURE — 80061 LIPID PANEL: CPT | Performed by: INTERNAL MEDICINE

## 2025-02-24 PROCEDURE — 85025 COMPLETE CBC W/AUTO DIFF WBC: CPT | Performed by: INTERNAL MEDICINE

## 2025-02-24 PROCEDURE — 3078F DIAST BP <80 MM HG: CPT | Mod: CPTII,S$GLB,, | Performed by: INTERNAL MEDICINE

## 2025-02-24 PROCEDURE — 1159F MED LIST DOCD IN RCRD: CPT | Mod: CPTII,S$GLB,, | Performed by: INTERNAL MEDICINE

## 2025-02-24 RX ORDER — UMECLIDINIUM 62.5 UG/1
62.5 AEROSOL, POWDER ORAL DAILY
Qty: 90 EACH | Refills: 3 | Status: SHIPPED | OUTPATIENT
Start: 2025-02-24 | End: 2025-02-27

## 2025-02-24 NOTE — PROGRESS NOTES
Subjective:       Patient ID: Nora Burger is a 64 y.o. female.    Chief Complaint: Follow-up      HPI  Nora Burger is a 64 y.o. year old female with HTN, hypothyroidism, GERD, COPD, hx of tobacco use presents for annual exam. At baseline health, doing well. No new complaints.     Review of Systems   Constitutional:  Negative for fever.   Gastrointestinal:  Positive for abdominal pain. Negative for constipation, diarrhea, nausea and vomiting.   Genitourinary:  Negative for dysuria, frequency and hematuria.   Musculoskeletal:  Positive for arthralgias. Negative for myalgias.   Neurological:  Negative for headaches.         Past Medical History:   Diagnosis Date    Abnormal Pap smear of cervix     GERD (gastroesophageal reflux disease)     Hypertension     Hypothyroidism, unspecified         Prior to Admission medications    Medication Sig Start Date End Date Taking? Authorizing Provider   albuterol (VENTOLIN HFA) 90 mcg/actuation inhaler Inhale 2 puffs into the lungs every 6 (six) hours as needed for Wheezing or Shortness of Breath. Rescue 2/6/24  Yes Noah Monroy MD   aspirin (ECOTRIN) 81 MG EC tablet Take 81 mg by mouth once daily.   Yes Provider, Historical   diltiaZEM (CARDIZEM CD) 240 MG 24 hr capsule Take 1 capsule (240 mg total) by mouth once daily. 11/18/24  Yes Noah Monroy MD   AMANDA 0.05 mg/24 hr Place 1 patch onto the skin twice a week. 2/29/24 2/28/25 Yes Melania Austin, SHARRON   hydroCHLOROthiazide (HYDRODIURIL) 25 MG tablet Take 1 tablet (25 mg total) by mouth once daily. 2/6/24  Yes Noah Monroy MD   levothyroxine (SYNTHROID) 50 MCG tablet Take 1 tablet (50 mcg total) by mouth before breakfast. 2/6/24  Yes Noah Monroy MD   losartan (COZAAR) 50 MG tablet Take 1 tablet (50 mg total) by mouth once daily. 2/6/24  Yes Noah Monroy MD   omeprazole (PRILOSEC) 40 MG capsule Take 1 capsule (40 mg total) by mouth 2 (two) times daily before meals. 8/28/24 8/28/25 Yes Malou Peña MD   umeclidinium  "(INCRUSE ELLIPTA) 62.5 mcg/actuation inhalation capsule Inhale 62.5 mcg into the lungs once daily. Controller 8/14/24  Yes Eduardo Valdez MD   ezetimibe (ZETIA) 10 mg tablet Take 1 tablet (10 mg total) by mouth once daily. 2/6/24 2/5/25  Noah Monroy MD        Past medical history, surgical history, and family medical history reviewed and updated as appropriate.    Medications and allergies reviewed.     Objective:          Vitals:    02/24/25 0759   BP: 122/78   BP Location: Right arm   Patient Position: Sitting   Pulse: 74   SpO2: 98%   Weight: 70.6 kg (155 lb 10.3 oz)   Height: 5' 6" (1.676 m)     Body mass index is 25.12 kg/m².  Physical Exam  Constitutional:       Appearance: She is well-developed.   HENT:      Head: Normocephalic and atraumatic.   Eyes:      Extraocular Movements: Extraocular movements intact.   Cardiovascular:      Rate and Rhythm: Normal rate and regular rhythm.      Heart sounds: Normal heart sounds.   Pulmonary:      Effort: Pulmonary effort is normal. No respiratory distress.      Breath sounds: Normal breath sounds. No wheezing.   Abdominal:      General: Bowel sounds are normal. There is no distension.      Palpations: Abdomen is soft.      Tenderness: There is no abdominal tenderness.   Musculoskeletal:         General: No tenderness. Normal range of motion.      Cervical back: Normal range of motion.   Skin:     General: Skin is warm and dry.   Neurological:      Mental Status: She is alert and oriented to person, place, and time.      Cranial Nerves: No cranial nerve deficit.      Deep Tendon Reflexes: Reflexes are normal and symmetric.         Lab Results   Component Value Date    WBC 7.28 01/08/2024    HGB 15.3 01/08/2024    HCT 46.7 01/08/2024     01/08/2024    CHOL 175 02/07/2024    TRIG 92 02/07/2024    HDL 54 02/07/2024    ALT 11 08/01/2024    AST 20 08/01/2024     08/01/2024    K 4.3 08/01/2024     08/01/2024    CREATININE 0.8 08/01/2024    BUN 16 " "08/01/2024    CO2 28 08/01/2024    TSH 2.724 02/07/2024    HGBA1C 5.4 02/07/2024       Assessment:       1. Encounter for annual physical exam    2. Primary hypertension    3. Hypothyroidism (acquired)    4. Coronary artery calcification seen on CT scan    5. Aortic atherosclerosis    6. Statin intolerance    7. Gastroesophageal reflux disease, unspecified whether esophagitis present    8. Overweight with body mass index (BMI) of 25 to 25.9 in adult    9. TERESA (obstructive sleep apnea)    10. Hepatic steatosis    11. Chronic kidney disease, stage 2 (mild)    12. Centrilobular emphysema    13. Personal history of nicotine dependence    14. Breast cancer screening by mammogram          Plan:     Nora Downing" was seen today for follow-up.    Diagnoses and all orders for this visit:    Encounter for annual physical exam    Primary hypertension  Comments:  controlled, on diltiazem, HCTZ, losartan. no changes to current management  Orders:  -     CBC Auto Differential; Future  -     Comprehensive Metabolic Panel; Future  -     TSH; Future    Hypothyroidism (acquired)  Comments:  on levothyroxine 50, recheck TSH  Orders:  -     Lipid Panel; Future    Coronary artery calcification seen on CT scan  Comments:  on ASA, zetia, intolerant of statin  Orders:  -     Lipid Panel; Future    Aortic atherosclerosis    Statin intolerance  Comments:  statin myalgias, on zetia, has seen cardiology    Gastroesophageal reflux disease, unspecified whether esophagitis present    Overweight with body mass index (BMI) of 25 to 25.9 in adult  -     Ambulatory Referral/Consult to Lifestyle Nutrition; Future    TERESA (obstructive sleep apnea)    Hepatic steatosis  -     Ambulatory Referral/Consult to Lifestyle Nutrition; Future    Chronic kidney disease, stage 2 (mild)  -     Ambulatory Referral/Consult to Lifestyle Nutrition; Future    Centrilobular emphysema  Comments:  seen on CT imaging, on incruse ellipta, albuterol inhaler prn    Personal " history of nicotine dependence  Comments:  quit 3 years ago, due for LDCT  Orders:  -     CT Chest Lung Screening Low Dose; Future    Breast cancer screening by mammogram  -     Mammo Digital Screening Bilat; Future    Benign physical examination, no issues identified. Will obtain routine labwork and age appropriate health screenings.     Health maintenance reviewed with patient.     Follow up in about 6 months (around 8/24/2025).    Noah Monroy MD  Internal Medicine / Primary Care  [facility]  2/24/2025    Answers submitted by the patient for this visit:  Abdominal Pain Questionnaire (Submitted on 2/23/2025)  Chief Complaint: Abdominal pain  Chronicity: recurrent  Onset: more than 1 year ago  Onset quality: gradual  Frequency: every several days  Episode duration: 2 Days  Progression since onset: waxing and waning  Pain - numeric: 2/10  Pain quality: aching, dull, a sensation of fullness  anorexia: No  belching: No  flatus: No  hematochezia: No  melena: No  weight loss: No  Aggravated by: eating  Relieved by: being still  Diagnostic workup: CT scan  Pain severity: mild  Treatments tried: proton pump inhibitors  Improvement on treatment: moderate  abdominal surgery: No  colon cancer: No  Crohn's disease: No  gallstones: No  GERD: Yes  irritable bowel syndrome: No  kidney stones: No  UTI: No

## 2025-02-24 NOTE — PATIENT INSTRUCTIONS
"Fasting labwork today.  Restart vitamin D3 2000 IU daily and calcium 1200 mg daily.   Schedule low dose CT scan of chest for lung cancer screening.   Schedule mammogram after 7/24/2025    Vaccinations  Shingles vaccination at your convenience - "Shingrix" two shot series, 2-6 months apart. Can get here at [facility] or at any local pharmacy.     RSV vaccination    Return to clinic in 6 months or sooner if needed.   "

## 2025-02-25 ENCOUNTER — RESULTS FOLLOW-UP (OUTPATIENT)
Dept: INTERNAL MEDICINE | Facility: CLINIC | Age: 65
End: 2025-02-25

## 2025-02-25 DIAGNOSIS — I10 PRIMARY HYPERTENSION: ICD-10-CM

## 2025-02-25 RX ORDER — LOSARTAN POTASSIUM 50 MG/1
50 TABLET ORAL
Qty: 90 TABLET | Refills: 3 | Status: SHIPPED | OUTPATIENT
Start: 2025-02-25

## 2025-02-25 NOTE — TELEPHONE ENCOUNTER
Refill Decision Note   Nora Burger  is requesting a refill authorization.  Brief Assessment and Rationale for Refill:  Approve     Medication Therapy Plan:         Comments:     Note composed:11:48 AM 02/25/2025

## 2025-02-25 NOTE — TELEPHONE ENCOUNTER
No care due was identified.  Health [facility] Embedded Care Due Messages. Reference number: [id number].   2/25/2025 11:38:06 AM CST

## 2025-02-26 DIAGNOSIS — J43.9 PULMONARY EMPHYSEMA, UNSPECIFIED EMPHYSEMA TYPE: ICD-10-CM

## 2025-02-26 DIAGNOSIS — E03.9 HYPOTHYROIDISM (ACQUIRED): ICD-10-CM

## 2025-02-27 ENCOUNTER — HOSPITAL ENCOUNTER (OUTPATIENT)
Dept: RADIOLOGY | Facility: HOSPITAL | Age: 65
Discharge: HOME | End: 2025-02-27
Attending: INTERNAL MEDICINE
Payer: MEDICARE

## 2025-02-27 DIAGNOSIS — Z87.891 PERSONAL HISTORY OF NICOTINE DEPENDENCE: ICD-10-CM

## 2025-02-27 PROCEDURE — 71271 CT THORAX LUNG CANCER SCR C-: CPT | Mod: TC

## 2025-02-27 RX ORDER — UMECLIDINIUM 62.5 UG/1
AEROSOL, POWDER ORAL
Qty: 30 EACH | Refills: 0 | Status: SHIPPED | OUTPATIENT
Start: 2025-02-27

## 2025-02-27 RX ORDER — ALBUTEROL SULFATE 90 UG/1
2 INHALANT RESPIRATORY (INHALATION) EVERY 6 HOURS PRN
Qty: 18 G | Refills: 5 | Status: SHIPPED | OUTPATIENT
Start: 2025-02-27

## 2025-02-27 RX ORDER — LEVOTHYROXINE SODIUM 50 UG/1
50 TABLET ORAL
Qty: 90 TABLET | Refills: 3 | OUTPATIENT
Start: 2025-02-27

## 2025-02-27 RX ORDER — LEVOTHYROXINE SODIUM 50 UG/1
50 TABLET ORAL
Qty: 90 TABLET | Refills: 3 | Status: SHIPPED | OUTPATIENT
Start: 2025-02-27

## 2025-02-27 NOTE — TELEPHONE ENCOUNTER
Refill Routing Note   Medication(s) are not appropriate for processing by [facility] for the following reason(s):        No active prescription written by provider    [facility] action(s):  Defer             Appointments  past 12m or future 3m with PCP    Date Provider   Last Visit   2/24/2025 Noah Mornoy MD   Next Visit   2/26/2025 Noah Monroy MD   ED visits in past 90 days: 0        Note composed:7:16 PM 02/26/2025

## 2025-02-27 NOTE — TELEPHONE ENCOUNTER
No care due was identified.  Health [facility] Embedded Care Due Messages. Reference number: [id number].   2/26/2025 6:17:27 PM CST

## 2025-02-27 NOTE — TELEPHONE ENCOUNTER
No care due was identified.  Health [facility] Embedded Care Due Messages. Reference number: [id number].   2/26/2025 6:51:34 PM CST

## 2025-03-14 ENCOUNTER — OFFICE VISIT (OUTPATIENT)
Dept: DERMATOLOGY | Facility: CLINIC | Age: 65
End: 2025-03-14
Payer: MEDICARE

## 2025-03-14 DIAGNOSIS — D22.9 MULTIPLE BENIGN NEVI: ICD-10-CM

## 2025-03-14 DIAGNOSIS — D48.5 NEOPLASM OF UNCERTAIN BEHAVIOR OF SKIN: Primary | ICD-10-CM

## 2025-03-14 DIAGNOSIS — L82.0 SEBORRHEIC KERATOSES, INFLAMED: ICD-10-CM

## 2025-03-14 DIAGNOSIS — L40.9 PSORIASIS: ICD-10-CM

## 2025-03-14 DIAGNOSIS — L82.1 SEBORRHEIC KERATOSES: ICD-10-CM

## 2025-03-14 DIAGNOSIS — D18.01 CHERRY ANGIOMA: ICD-10-CM

## 2025-03-14 DIAGNOSIS — Z12.83 SCREENING EXAM FOR SKIN CANCER: ICD-10-CM

## 2025-03-14 DIAGNOSIS — L81.4 LENTIGO: ICD-10-CM

## 2025-03-14 PROCEDURE — 99999 PR PBB SHADOW E&M-EST. PATIENT-LVL III: CPT | Mod: PBBFAC,,, | Performed by: STUDENT IN AN ORGANIZED HEALTH CARE EDUCATION/TRAINING PROGRAM

## 2025-03-14 RX ORDER — CLOBETASOL PROPIONATE 0.5 MG/ML
SOLUTION TOPICAL DAILY
Qty: 50 ML | Refills: 3 | Status: SHIPPED | OUTPATIENT
Start: 2025-03-14

## 2025-03-14 NOTE — PATIENT INSTRUCTIONS
Sunscreen Guidelines  Sunscreen protects your skin by absorbing and reflecting ultraviolet rays. All sunscreens have a sun protection factor (SPF) rating that indicates how long a sunscreen will remain effective on the skin.    Why protect your skin?  The sun's rays are composed of many different wavelengths, including UVA, UVB, and visible light that each affect the skin differently.    UVB: sunburn, photoaging, skin cancer (melanoma, basal cell, and squamous cell carcinomas) and modulation of the skin's immune system.    UVA: similar to above but thought to contribute more to aging; at the same dose of UVB it is less powerful however the sun has 10-20 times the levels of UVA as compared with UVB.  Visible light: implicated in causing unwanted darkening of skin, such as melasma and post-inflammatory hyperpigmentation in darker skin types     If I have dark skin, do I need to worry about the sun?    More darkly pigmented skin is more protected against UV-induced skin cancer, sunburn, and photoaging, though may still suffer from sun-related conditions, including melasma, hyperpigmentation, and other dark spots.    Sun avoidance  As a general rule, stay out of the sun as much as possible between 10 a.m. - 4 p.m.    Download the EPA UV index sergio to track the UV index by hour in your zip code.      Which sunscreen should I choose?  The best sunscreen to use varies by individual. The one that feels best on your skin and fits your lifestyle will be the one you will likely use most regularly.   Active ingredients of sunscreen vary by , and may be a chemical (such as avobenzone or oxybenzone) or physical agent (such as zinc oxide or titanium dioxide). I recommend a physical agent.  A water-resistant sunscreen is one that maintains the SPF level after 40 minutes of water exposure. A very water-resistant sunscreen maintains the SPF level after 80 minutes of water exposure.    Sunscreen: this is the last layer in  "sun protection   Be generous: 1 shot glass of sunscreen for your body, ½ teaspoon for your face/neck  Reapply every 2 hours  Broad spectrum (provides UVA/UVB protection), SPF 50 or above  Avoid spray sunscreens: less effective and have been found to contain benzene (carcinogen)    Sun protective clothing  Although sunscreen helps minimize sun damage, no sunscreen completely blocks all wavelengths of UV light. Wearing sun protective clothing such as hats, rashguards or swim shirts, and long sleeves and/or pants, as well as avoiding sun exposure from 10 a.m. to 4 p.m. will help protect your skin from overexposure and minimize sun damage. Seek shade.  Long sleeved clothing, hats, and sunglasses: makes sun protection easier, more effective, and can even be more affordable, since sunscreen needs to be reapplied frequently.    Solumbra (www.sunprectautions.[employer])  Exavio (www.qunb.[employer])  Coolibar ([employer])  [employer]'s EarlySense (wwwRegister My InfoÂ®)  Hats from [employer] ([email or web address])    My Favorite Sunscreens:  Physical blockers: Can have a "white case" but in general are more effective  - Face: CeraVe tinted mineral sunscreen, Bare Minerals complexion rescue (20 shades), Elta MD (UV elements, UV physical, UV restore, etc), Tizo ultra zinc tinted, Cetaphil Sheer Mineral Face Liquid Sunscreen  - Body: Blue Lizard, Neutrogena Sheer Zinc, Eucerin Daily Protection, Aveeno Baby        Shave Biopsy Wound Care    Your doctor has performed a shave biopsy today.  A band aid and vaseline ointment has been placed over the site.  This should remain in place for NO LONGER THAN 48 hours.  It is fine to remove the bandaid after 24 hours, if the area is no longer bleeding. It is recommended that you keep the area dry (do not wet)) for the first 24 hours.  After 24 hours, wash the area with warm soap and water and apply Vaseline jelly.  Many patients prefer to use Neosporin or Bacitracin ointment.  This is acceptable; " however, know that you can develop an allergy to this medication even if you have used it safely for years.  It is important to keep the area moist.  Letting it dry out and get air slows healing time, and will worsen the scar.        If you notice increasing redness, tenderness, pain, or yellow drainage at the biopsy site, please notify your doctor.  These are signs of an infection.    If your biopsy site is bleeding, apply firm pressure for 15 minutes straight.  Repeat for another 15 minutes, if it is still bleeding.   If the surgical site continues to bleed, then please contact your doctor.      For [facility] users:   You will receive your biopsy results in [facility] as soon as they are available. Please be assured that your physician/provider will review your results and will then determine what further treatment, evaluation, or planning is required. You should be contacted by your physician's/provider's office within 5 business days of receiving your results; If not, please reach out to directly. This is one more way [facility] is putting you first.     [facility] [place] [address]/ [phone] FAX/ [email or web address]

## 2025-03-14 NOTE — PROGRESS NOTES
Subjective:      Patient ID:  Nora Burger is a 64 y.o. female who presents for   Chief Complaint   Patient presents with    Skin Check     TBSE      Pt here for TBSE    Pt denies personal h/o skin cancer but dad had SCC    Pt concerned about R thigh - lesion , itching   Pt has concern for dry patch back of scalp   Pt has wart like lesions under neck       Review of Systems   Skin:  Positive for itching and dry skin. Negative for rash.       Objective:   Physical Exam   Constitutional: She appears well-developed and well-nourished. No distress.   Neurological: She is alert and oriented to person, place, and time. She is not disoriented.   Psychiatric: She has a normal mood and affect.   Skin:   Areas Examined (abnormalities noted in diagram):   Scalp / Hair Palpated and Inspected  Head / Face Inspection Performed  Neck Inspection Performed  Chest / Axilla Inspection Performed  Abdomen Inspection Performed  Back Inspection Performed  RUE Inspected  LUE Inspection Performed  RLE Inspected  LLE Inspection Performed  Nails and Digits Inspection Performed                     Diagram Legend     Erythematous scaling macule/papule c/w actinic keratosis       Vascular papule c/w angioma      Pigmented verrucoid papule/plaque c/w seborrheic keratosis      Yellow umbilicated papule c/w sebaceous hyperplasia      Irregularly shaped tan macule c/w lentigo     1-2 mm smooth white papules consistent with Milia      Movable subcutaneous cyst with punctum c/w epidermal inclusion cyst      Subcutaneous movable cyst c/w pilar cyst      Firm pink to brown papule c/w dermatofibroma      Pedunculated fleshy papule(s) c/w skin tag(s)      Evenly pigmented macule c/w junctional nevus     Mildly variegated pigmented, slightly irregular-bordered macule c/w mildly atypical nevus      Flesh colored to evenly pigmented papule c/w intradermal nevus       Pink pearly papule/plaque c/w basal cell carcinoma      Erythematous hyperkeratotic  cursted plaque c/w SCC      Surgical scar with no sign of skin cancer recurrence      Open and closed comedones      Inflammatory papules and pustules      Verrucoid papule consistent consistent with wart     Erythematous eczematous patches and plaques     Dystrophic onycholytic nail with subungual debris c/w onychomycosis     Umbilicated papule    Erythematous-base heme-crusted tan verrucoid plaque consistent with inflamed seborrheic keratosis     Erythematous Silvery Scaling Plaque c/w Psoriasis     See annotation      Assessment / Plan:      Pathology Orders:       Normal Orders This Visit    Specimen to Pathology, Dermatology     Questions:    Procedure Type: Dermatology and skin neoplasms    Number of Specimens: 1    ------------------------: -------------------------    Spec 1 Procedure: Biopsy    Spec 1 Clinical Impression: iSK vs NMSC    Spec 1 Source: right posterior leg    Release to patient: Immediate    Release to patient:           Neoplasm of uncertain behavior of skin  Shave biopsy procedure note:    Shave biopsy performed after verbal consent including risk of infection, scar, recurrence, need for additional treatment of site. Area prepped with alcohol, anesthetized with approximately 1.0cc of 1% lidocaine with epinephrine. Lesional tissue shaved with razor blade. Hemostasis achieved with application of aluminum chloride followed by hyfrecation. No complications. Dressing applied. Wound care explained.    Seborrheic keratoses, inflamed--groin  - lesion was painful and bleeding intermittently    Verbal consent obtained. 1 lesions removed with scissor snip removal after anesthesia with 1% lidocaine with epinephrine. Hemostasis achieved with aluminum chloride and hyfrecation. No complications.    Seborrheic keratoses  Multiple benign nevi  Lentigo  Cherry angioma  Reassurance given to patient. No treatment is necessary.   Treatment of benign, asymptomatic lesions may be considered cosmetic.    Screening  exam for skin cancer  Total body skin examination performed today including at least 12 points as noted in physical examination. Suspicious lesions noted.    Recommend daily sun protection/avoidance, use of at least SPF 30, broad spectrum sunscreen (OTC drug), skin self examinations, and routine physician surveillance to optimize early detection    Psoriasis--scalp  -     clobetasoL (TEMOVATE) 0.05 % external solution; Apply topically once daily. Use to affected areas for up to 2 weeks then take a 1 week break or decrease to 3 times weekly. Do not apply to groin or face. Use to back of scalp  Dispense: 50 mL; Refill: 3             Follow up in about 1 year (around 3/14/2026) for TBSE.

## 2025-03-18 ENCOUNTER — RESULTS FOLLOW-UP (OUTPATIENT)
Dept: DERMATOLOGY | Facility: CLINIC | Age: 65
End: 2025-03-18

## 2025-03-18 DIAGNOSIS — N95.1 MENOPAUSAL SYMPTOMS: ICD-10-CM

## 2025-03-20 RX ORDER — ESTRADIOL 0.05 MG/D
1 PATCH, EXTENDED RELEASE TRANSDERMAL
Qty: 24 PATCH | Refills: 3 | Status: SHIPPED | OUTPATIENT
Start: 2025-03-20 | End: 2026-03-20

## 2025-03-24 DIAGNOSIS — Z00.00 ENCOUNTER FOR MEDICARE ANNUAL WELLNESS EXAM: ICD-10-CM

## 2025-03-25 ENCOUNTER — PATIENT MESSAGE (OUTPATIENT)
Dept: OBSTETRICS AND GYNECOLOGY | Facility: CLINIC | Age: 65
End: 2025-03-25
Payer: MEDICARE

## 2025-04-29 ENCOUNTER — OFFICE VISIT (OUTPATIENT)
Dept: INTERNAL MEDICINE | Facility: CLINIC | Age: 65
End: 2025-04-29
Payer: MEDICARE

## 2025-04-29 VITALS
HEART RATE: 62 BPM | DIASTOLIC BLOOD PRESSURE: 78 MMHG | HEIGHT: 66 IN | WEIGHT: 148.56 LBS | OXYGEN SATURATION: 98 % | BODY MASS INDEX: 23.88 KG/M2 | SYSTOLIC BLOOD PRESSURE: 114 MMHG

## 2025-04-29 DIAGNOSIS — K76.0 METABOLIC DYSFUNCTION-ASSOCIATED FATTY LIVER DISEASE (MAFLD): ICD-10-CM

## 2025-04-29 DIAGNOSIS — Z09 FOLLOW-UP EXAM: Primary | ICD-10-CM

## 2025-04-29 DIAGNOSIS — R45.89 CRYING: ICD-10-CM

## 2025-04-29 PROCEDURE — 3288F FALL RISK ASSESSMENT DOCD: CPT | Mod: CPTII,S$GLB,, | Performed by: INTERNAL MEDICINE

## 2025-04-29 PROCEDURE — 1159F MED LIST DOCD IN RCRD: CPT | Mod: CPTII,S$GLB,, | Performed by: INTERNAL MEDICINE

## 2025-04-29 PROCEDURE — 3008F BODY MASS INDEX DOCD: CPT | Mod: CPTII,S$GLB,, | Performed by: INTERNAL MEDICINE

## 2025-04-29 PROCEDURE — 1125F AMNT PAIN NOTED PAIN PRSNT: CPT | Mod: CPTII,S$GLB,, | Performed by: INTERNAL MEDICINE

## 2025-04-29 PROCEDURE — 99213 OFFICE O/P EST LOW 20 MIN: CPT | Mod: S$GLB,,, | Performed by: INTERNAL MEDICINE

## 2025-04-29 PROCEDURE — 1101F PT FALLS ASSESS-DOCD LE1/YR: CPT | Mod: CPTII,S$GLB,, | Performed by: INTERNAL MEDICINE

## 2025-04-29 PROCEDURE — 3074F SYST BP LT 130 MM HG: CPT | Mod: CPTII,S$GLB,, | Performed by: INTERNAL MEDICINE

## 2025-04-29 PROCEDURE — 4010F ACE/ARB THERAPY RXD/TAKEN: CPT | Mod: CPTII,S$GLB,, | Performed by: INTERNAL MEDICINE

## 2025-04-29 PROCEDURE — 3078F DIAST BP <80 MM HG: CPT | Mod: CPTII,S$GLB,, | Performed by: INTERNAL MEDICINE

## 2025-04-29 PROCEDURE — 99999 PR PBB SHADOW E&M-EST. PATIENT-LVL III: CPT | Mod: PBBFAC,,, | Performed by: INTERNAL MEDICINE

## 2025-04-29 NOTE — PROGRESS NOTES
Subjective:       Patient ID: Nora Burger is a 64 y.o. female.    Chief Complaint: Follow-up      HPI  Nora Burger is a 64 y.o. year old female established patient presents for follow up. Doing well, has been losing weight.     Review of Systems   Constitutional:  Negative for activity change, appetite change, fatigue, fever and unexpected weight change.   HENT:  Negative for congestion, hearing loss, postnasal drip, sneezing, sore throat, trouble swallowing and voice change.    Eyes:  Negative for pain and discharge.   Respiratory:  Negative for cough, choking, chest tightness, shortness of breath and wheezing.    Cardiovascular:  Negative for chest pain, palpitations and leg swelling.   Gastrointestinal:  Negative for abdominal distention, abdominal pain, blood in stool, constipation, diarrhea, nausea and vomiting.   Endocrine: Negative for polydipsia and polyuria.   Genitourinary:  Negative for difficulty urinating and flank pain.   Musculoskeletal:  Negative for arthralgias, back pain, joint swelling, myalgias and neck pain.   Skin:  Negative for rash.   Neurological:  Negative for dizziness, tremors, seizures, weakness, numbness and headaches.   Psychiatric/Behavioral:  Negative for agitation. The patient is not nervous/anxious.          Past Medical History:   Diagnosis Date    Abnormal Pap smear of cervix     GERD (gastroesophageal reflux disease)     Hypertension     Hypothyroidism, unspecified         Prior to Admission medications    Medication Sig Start Date End Date Taking? Authorizing Provider   albuterol (VENTOLIN HFA) 90 mcg/actuation inhaler Inhale 2 puffs into the lungs every 6 (six) hours as needed for Wheezing or Shortness of Breath. Rescue 2/27/25  Yes Noah Monroy MD   aspirin (ECOTRIN) 81 MG EC tablet Take 81 mg by mouth once daily.   Yes Provider, Historical   clobetasoL (TEMOVATE) 0.05 % external solution Apply topically once daily. Use to affected areas for up to 2 weeks then take  "a 1 week break or decrease to 3 times weekly. Do not apply to groin or face. Use to back of scalp 3/14/25  Yes Everett Maria MD   diltiaZEM (CARDIZEM CD) 240 MG 24 hr capsule Take 1 capsule (240 mg total) by mouth once daily. 11/18/24  Yes Noah Monroy MD   AMANDA 0.05 mg/24 hr Place 1 patch onto the skin twice a week. 3/20/25 3/20/26 Yes Melania Austin NP   hydroCHLOROthiazide (HYDRODIURIL) 25 MG tablet Take 1 tablet (25 mg total) by mouth once daily. 2/6/24  Yes Noah Monroy MD   INCRUSE ELLIPTA 62.5 mcg/actuation inhalation capsule inhale  62.5mcg into the lungs once daily . 2/27/25  Yes Eduardo Valdez MD   INCRUSE ELLIPTA 62.5 mcg/actuation inhalation capsule INHALE 1 PUFF INTO LUNG DAILY 2/27/25  Yes Eduardo Valdez MD   levothyroxine (SYNTHROID) 50 MCG tablet Take 1 tablet (50 mcg total) by mouth before breakfast. 2/27/25  Yes Noah Monroy MD   losartan (COZAAR) 50 MG tablet TAKE ONE TABLET BY MOUTH ONE TIME DAILY 2/25/25  Yes Noah Monroy MD   omeprazole (PRILOSEC) 40 MG capsule Take 1 capsule (40 mg total) by mouth 2 (two) times daily before meals. 8/28/24 8/28/25 Yes Malou Peña MD   ezetimibe (ZETIA) 10 mg tablet Take 1 tablet (10 mg total) by mouth once daily. 2/6/24 2/5/25  Noah Monroy MD        Past medical history, surgical history, and family medical history reviewed and updated as appropriate.    Medications and allergies reviewed.     Objective:          Vitals:    04/29/25 1050   BP: 114/78   BP Location: Right arm   Patient Position: Sitting   Pulse: 62   SpO2: 98%   Weight: 67.4 kg (148 lb 9.4 oz)   Height: 5' 6" (1.676 m)     Body mass index is 23.98 kg/m².  Physical Exam  Constitutional:       Appearance: Normal appearance.   HENT:      Head: Normocephalic and atraumatic.   Eyes:      Extraocular Movements: Extraocular movements intact.   Cardiovascular:      Rate and Rhythm: Normal rate.   Pulmonary:      Effort: Pulmonary effort is normal.   Neurological:      Mental Status: " "She is alert and oriented to person, place, and time.         Lab Results   Component Value Date    WBC 6.40 02/24/2025    HGB 15.2 02/24/2025    HCT 46.3 02/24/2025     02/24/2025    CHOL 183 02/24/2025    TRIG 82 02/24/2025    HDL 60 02/24/2025    ALT 15 02/24/2025    AST 23 02/24/2025     02/24/2025    K 3.9 02/24/2025     02/24/2025    CREATININE 0.7 02/24/2025    BUN 12 02/24/2025    CO2 22 (L) 02/24/2025    TSH 1.936 02/24/2025    HGBA1C 5.4 02/07/2024       Assessment:       1. Follow-up exam    2. Metabolic dysfunction-associated fatty liver disease (MAFLD)    3. Crying          Plan:     Nora Downing" was seen today for annual exam.    Diagnoses and all orders for this visit:    Follow-up exam    Metabolic dysfunction-associated fatty liver disease (MAFLD)  Comments:  patient with lifestyle changes, has been losing weight. has MRI upcoming    Crying  Comments:  episodes of crying, associated with worrying about family. Discussed consideration of therapy. Patient will think about it. Does not feel depressed, anxious.        Health maintenance reviewed with patient.     Follow up if symptoms worsen or fail to improve.    Noah Monroy MD  Internal Medicine / Primary Care  [facility]  4/29/2025    "

## 2025-06-13 ENCOUNTER — TELEPHONE (OUTPATIENT)
Dept: INTERNAL MEDICINE | Facility: CLINIC | Age: 65
End: 2025-06-13
Payer: MEDICARE

## 2025-06-18 DIAGNOSIS — I10 PRIMARY HYPERTENSION: ICD-10-CM

## 2025-06-18 RX ORDER — HYDROCHLOROTHIAZIDE 25 MG/1
25 TABLET ORAL DAILY
Qty: 90 TABLET | Refills: 2 | Status: SHIPPED | OUTPATIENT
Start: 2025-06-18

## 2025-06-18 NOTE — TELEPHONE ENCOUNTER
No care due was identified.  [facility] Embedded Care Due Messages. Reference number: [id number].   6/18/2025 12:39:28 PM CDT

## 2025-06-18 NOTE — TELEPHONE ENCOUNTER
Refill Decision Note   Noar Tao  is requesting a refill authorization.  Brief Assessment and Rationale for Refill:  Approve     Medication Therapy Plan:         Comments:     Note composed:1:25 PM 06/18/2025

## 2025-07-25 ENCOUNTER — HOSPITAL ENCOUNTER (OUTPATIENT)
Dept: RADIOLOGY | Facility: HOSPITAL | Age: 65
Discharge: HOME | End: 2025-07-25
Attending: INTERNAL MEDICINE
Payer: MEDICARE

## 2025-07-25 DIAGNOSIS — Z12.31 BREAST CANCER SCREENING BY MAMMOGRAM: ICD-10-CM

## 2025-07-25 PROCEDURE — 77067 SCR MAMMO BI INCL CAD: CPT | Mod: TC

## 2025-07-25 PROCEDURE — 77067 SCR MAMMO BI INCL CAD: CPT | Mod: 26,,, | Performed by: RADIOLOGY

## 2025-07-25 PROCEDURE — 77063 BREAST TOMOSYNTHESIS BI: CPT | Mod: 26,,, | Performed by: RADIOLOGY

## 2025-07-30 ENCOUNTER — PATIENT MESSAGE (OUTPATIENT)
Dept: INTERNAL MEDICINE | Facility: CLINIC | Age: 65
End: 2025-07-30
Payer: MEDICARE

## 2025-07-30 DIAGNOSIS — Z78.0 MENOPAUSE: ICD-10-CM

## 2025-07-31 ENCOUNTER — HOSPITAL ENCOUNTER (OUTPATIENT)
Dept: RADIOLOGY | Facility: HOSPITAL | Age: 65
Discharge: HOME | End: 2025-07-31
Attending: INTERNAL MEDICINE
Payer: MEDICARE

## 2025-07-31 DIAGNOSIS — Z78.0 MENOPAUSE: ICD-10-CM

## 2025-07-31 PROCEDURE — 77080 DXA BONE DENSITY AXIAL: CPT | Mod: TC

## 2025-08-22 ENCOUNTER — HOSPITAL ENCOUNTER (OUTPATIENT)
Dept: RADIOLOGY | Facility: HOSPITAL | Age: 65
Discharge: HOME | End: 2025-08-22
Attending: INTERNAL MEDICINE
Payer: MEDICARE

## 2025-08-22 DIAGNOSIS — R93.5 ABNORMAL MRI OF ABDOMEN: ICD-10-CM

## 2025-08-22 PROCEDURE — 76376 3D RENDER W/INTRP POSTPROCES: CPT | Mod: TC

## 2025-08-22 PROCEDURE — A9585 GADOBUTROL INJECTION: HCPCS | Performed by: INTERNAL MEDICINE

## 2025-08-22 PROCEDURE — 25500020 PHARM REV CODE 255: Performed by: INTERNAL MEDICINE

## 2025-08-22 RX ORDER — GADOBUTROL 604.72 MG/ML
10 INJECTION INTRAVENOUS
Status: COMPLETED | OUTPATIENT
Start: 2025-08-22 | End: 2025-08-22

## 2025-08-22 RX ADMIN — GADOBUTROL 10 ML: 604.72 INJECTION INTRAVENOUS at 09:08

## 2025-08-23 ENCOUNTER — PATIENT MESSAGE (OUTPATIENT)
Dept: INTERNAL MEDICINE | Facility: CLINIC | Age: 65
End: 2025-08-23
Payer: MEDICARE

## 2025-08-24 ENCOUNTER — PATIENT MESSAGE (OUTPATIENT)
Dept: GASTROENTEROLOGY | Facility: CLINIC | Age: 65
End: 2025-08-24
Payer: MEDICARE

## 2025-08-24 PROBLEM — Q45.3 PANCREAS DIVISUM OF NATIVE PANCREAS: Status: ACTIVE | Noted: 2025-08-24
